# Patient Record
Sex: FEMALE | HISPANIC OR LATINO | Employment: UNEMPLOYED
[De-identification: names, ages, dates, MRNs, and addresses within clinical notes are randomized per-mention and may not be internally consistent; named-entity substitution may affect disease eponyms.]

---

## 2017-12-24 ENCOUNTER — HEALTH MAINTENANCE LETTER (OUTPATIENT)
Age: 22
End: 2017-12-24

## 2018-05-16 ENCOUNTER — HOSPITAL ENCOUNTER (EMERGENCY)
Facility: CLINIC | Age: 23
Discharge: HOME | End: 2018-05-16
Attending: EMERGENCY MEDICINE | Admitting: EMERGENCY MEDICINE
Payer: COMMERCIAL

## 2018-05-16 ENCOUNTER — TELEPHONE (OUTPATIENT)
Dept: INTERNAL MEDICINE | Facility: CLINIC | Age: 23
End: 2018-05-16

## 2018-05-16 VITALS
HEART RATE: 90 BPM | HEIGHT: 60 IN | TEMPERATURE: 98.5 F | OXYGEN SATURATION: 99 % | RESPIRATION RATE: 16 BRPM | DIASTOLIC BLOOD PRESSURE: 71 MMHG | SYSTOLIC BLOOD PRESSURE: 120 MMHG | BODY MASS INDEX: 19.63 KG/M2 | WEIGHT: 100 LBS

## 2018-05-16 DIAGNOSIS — T74.21XA SEXUAL ASSAULT OF ADULT, INITIAL ENCOUNTER: ICD-10-CM

## 2018-05-16 DIAGNOSIS — R41.3 AMNESIA MEMORY LOSS: ICD-10-CM

## 2018-05-16 LAB
AMPHETAMINES UR QL SCN: NEGATIVE
BARBITURATES UR QL: NEGATIVE
BENZODIAZ UR QL: NEGATIVE
CANNABINOIDS UR QL SCN: NEGATIVE
COCAINE UR QL: NEGATIVE
HCG UR QL: NEGATIVE
OPIATES UR QL SCN: NEGATIVE
PCP UR QL SCN: NEGATIVE

## 2018-05-16 PROCEDURE — 25000132 ZZH RX MED GY IP 250 OP 250 PS 637: Performed by: EMERGENCY MEDICINE

## 2018-05-16 PROCEDURE — 25000128 H RX IP 250 OP 636: Performed by: EMERGENCY MEDICINE

## 2018-05-16 PROCEDURE — 96372 THER/PROPH/DIAG INJ SC/IM: CPT

## 2018-05-16 PROCEDURE — 25000125 ZZHC RX 250: Performed by: EMERGENCY MEDICINE

## 2018-05-16 PROCEDURE — 81025 URINE PREGNANCY TEST: CPT | Performed by: EMERGENCY MEDICINE

## 2018-05-16 PROCEDURE — 80307 DRUG TEST PRSMV CHEM ANLYZR: CPT | Performed by: EMERGENCY MEDICINE

## 2018-05-16 PROCEDURE — 99285 EMERGENCY DEPT VISIT HI MDM: CPT

## 2018-05-16 RX ORDER — CEFTRIAXONE SODIUM 250 MG
250 VIAL (EA) INJECTION ONCE
Status: COMPLETED | OUTPATIENT
Start: 2018-05-16 | End: 2018-05-16

## 2018-05-16 RX ORDER — METRONIDAZOLE 500 MG/1
2000 TABLET ORAL ONCE
Status: COMPLETED | OUTPATIENT
Start: 2018-05-16 | End: 2018-05-16

## 2018-05-16 RX ORDER — ONDANSETRON 4 MG/1
4 TABLET, ORALLY DISINTEGRATING ORAL ONCE
Status: COMPLETED | OUTPATIENT
Start: 2018-05-16 | End: 2018-05-16

## 2018-05-16 RX ORDER — AZITHROMYCIN 250 MG/1
1000 TABLET, FILM COATED ORAL ONCE
Status: COMPLETED | OUTPATIENT
Start: 2018-05-16 | End: 2018-05-16

## 2018-05-16 RX ADMIN — ONDANSETRON 4 MG: 4 TABLET, ORALLY DISINTEGRATING ORAL at 18:32

## 2018-05-16 RX ADMIN — AZITHROMYCIN 1000 MG: 250 TABLET, FILM COATED ORAL at 20:00

## 2018-05-16 RX ADMIN — METRONIDAZOLE 2000 MG: 500 TABLET ORAL at 20:00

## 2018-05-16 RX ADMIN — CEFTRIAXONE SODIUM 250 MG: 250 INJECTION, POWDER, FOR SOLUTION INTRAMUSCULAR; INTRAVENOUS at 20:00

## 2018-05-16 ASSESSMENT — ENCOUNTER SYMPTOMS
CONFUSION: 1
HEADACHES: 1
FATIGUE: 1
WEAKNESS: 1
DIZZINESS: 1

## 2018-05-16 NOTE — ED AVS SNAPSHOT
[facility] Emergency Department    [address]    [facility] [address]    Phone:  [phone]    Fax:  [phone]                                       Saskia Ayon   MRN: [id number]    Department:  [facility] Emergency Department   Date of Visit:  5/16/2018           After Visit Summary Signature Page     I have received my discharge instructions, and my questions have been answered. I have discussed any challenges I see with this plan with the nurse or doctor.    ..........................................................................................................................................  Patient/Patient Representative Signature      ..........................................................................................................................................  Patient Representative Print Name and Relationship to Patient    ..................................................               ................................................  Date                                            Time    ..........................................................................................................................................  Reviewed by Signature/Title    ...................................................              ..............................................  Date                                                            Time
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
## 2019-06-17 ENCOUNTER — APPOINTMENT (OUTPATIENT)
Dept: GENERAL RADIOLOGY | Facility: CLINIC | Age: 24
End: 2019-06-17
Attending: EMERGENCY MEDICINE
Payer: COMMERCIAL

## 2019-06-17 ENCOUNTER — HOSPITAL ENCOUNTER (EMERGENCY)
Facility: CLINIC | Age: 24
Discharge: HOME | End: 2019-06-17
Attending: EMERGENCY MEDICINE | Admitting: EMERGENCY MEDICINE
Payer: COMMERCIAL

## 2019-06-17 VITALS
BODY MASS INDEX: 21.2 KG/M2 | HEIGHT: 60 IN | TEMPERATURE: 96.9 F | SYSTOLIC BLOOD PRESSURE: 111 MMHG | DIASTOLIC BLOOD PRESSURE: 70 MMHG | HEART RATE: 73 BPM | RESPIRATION RATE: 16 BRPM | OXYGEN SATURATION: 100 % | WEIGHT: 108 LBS

## 2019-06-17 DIAGNOSIS — R06.00 DYSPNEA, UNSPECIFIED TYPE: ICD-10-CM

## 2019-06-17 DIAGNOSIS — R01.1 HEART MURMUR: ICD-10-CM

## 2019-06-17 DIAGNOSIS — R07.89 CHEST PRESSURE: ICD-10-CM

## 2019-06-17 LAB
ANION GAP SERPL CALCULATED.3IONS-SCNC: 6 MMOL/L (ref 3–14)
B-HCG FREE SERPL-ACNC: <5 IU/L
BASOPHILS # BLD AUTO: 0 10E9/L (ref 0–0.2)
BASOPHILS NFR BLD AUTO: 0.5 %
BUN SERPL-MCNC: 11 MG/DL (ref 7–30)
CALCIUM SERPL-MCNC: 8.8 MG/DL (ref 8.5–10.1)
CHLORIDE SERPL-SCNC: 105 MMOL/L (ref 94–109)
CO2 SERPL-SCNC: 30 MMOL/L (ref 20–32)
CREAT SERPL-MCNC: 0.59 MG/DL (ref 0.52–1.04)
D DIMER PPP FEU-MCNC: 0.3 UG/ML FEU (ref 0–0.5)
DIFFERENTIAL METHOD BLD: NORMAL
EOSINOPHIL # BLD AUTO: 0.1 10E9/L (ref 0–0.7)
EOSINOPHIL NFR BLD AUTO: 1.6 %
ERYTHROCYTE [DISTWIDTH] IN BLOOD BY AUTOMATED COUNT: 11.9 % (ref 10–15)
GFR SERPL CREATININE-BSD FRML MDRD: >90 ML/MIN/{1.73_M2}
GLUCOSE SERPL-MCNC: 82 MG/DL (ref 70–99)
HCT VFR BLD AUTO: 41.7 % (ref 35–47)
HGB BLD-MCNC: 14 G/DL (ref 11.7–15.7)
IMM GRANULOCYTES # BLD: 0 10E9/L (ref 0–0.4)
IMM GRANULOCYTES NFR BLD: 0.3 %
LYMPHOCYTES # BLD AUTO: 2.2 10E9/L (ref 0.8–5.3)
LYMPHOCYTES NFR BLD AUTO: 36.5 %
MCH RBC QN AUTO: 30.2 PG (ref 26.5–33)
MCHC RBC AUTO-ENTMCNC: 33.6 G/DL (ref 31.5–36.5)
MCV RBC AUTO: 90 FL (ref 78–100)
MONOCYTES # BLD AUTO: 0.6 10E9/L (ref 0–1.3)
MONOCYTES NFR BLD AUTO: 9.3 %
NEUTROPHILS # BLD AUTO: 3.2 10E9/L (ref 1.6–8.3)
NEUTROPHILS NFR BLD AUTO: 51.8 %
NRBC # BLD AUTO: 0 10*3/UL
NRBC BLD AUTO-RTO: 0 /100
NT-PROBNP SERPL-MCNC: 28 PG/ML (ref 0–450)
PLATELET # BLD AUTO: 260 10E9/L (ref 150–450)
POTASSIUM SERPL-SCNC: 3.4 MMOL/L (ref 3.4–5.3)
RBC # BLD AUTO: 4.63 10E12/L (ref 3.8–5.2)
SODIUM SERPL-SCNC: 141 MMOL/L (ref 133–144)
WBC # BLD AUTO: 6.1 10E9/L (ref 4–11)

## 2019-06-17 PROCEDURE — 71046 X-RAY EXAM CHEST 2 VIEWS: CPT

## 2019-06-17 PROCEDURE — 99285 EMERGENCY DEPT VISIT HI MDM: CPT | Mod: 25

## 2019-06-17 PROCEDURE — 93005 ELECTROCARDIOGRAM TRACING: CPT

## 2019-06-17 PROCEDURE — 80048 BASIC METABOLIC PNL TOTAL CA: CPT | Performed by: EMERGENCY MEDICINE

## 2019-06-17 PROCEDURE — 85025 COMPLETE CBC W/AUTO DIFF WBC: CPT | Performed by: EMERGENCY MEDICINE

## 2019-06-17 PROCEDURE — 83880 ASSAY OF NATRIURETIC PEPTIDE: CPT | Performed by: EMERGENCY MEDICINE

## 2019-06-17 PROCEDURE — 84702 CHORIONIC GONADOTROPIN TEST: CPT

## 2019-06-17 PROCEDURE — 85379 FIBRIN DEGRADATION QUANT: CPT | Performed by: EMERGENCY MEDICINE

## 2019-06-17 ASSESSMENT — ENCOUNTER SYMPTOMS
SHORTNESS OF BREATH: 1
MYALGIAS: 0
CHILLS: 0
NAUSEA: 0
FEVER: 0
LIGHT-HEADEDNESS: 0

## 2019-06-17 ASSESSMENT — MIFFLIN-ST. JEOR: SCORE: 1166.38

## 2019-06-17 NOTE — ED PROVIDER NOTES
"  History     Chief Complaint:  Shortness of Breath    HPI   Saskia Ayon is a 23 year old female who presents with difficulty breathing. The patient reports that at around 1200 yesterday she began experiencing difficulty breathing, accompanied by chest discomfort which she describes as a \"pressure on her chest.\" She reports that when talking or walking, she experiences worsened shortness of breath. She also states feeling a \"vibration\" when breathing in deeply. The feeling has been constant since onset and is worse with exertion. She denies leg pain, leg swelling, fever, chills, lightheadedness, or nausea. She has not traveled outside the country recently. The patient also notes she has not previous diagnosis of heart murmur.    Allergies:  No Known Drug Allergies    Medications:    Prenatal Multivitamin    Past Medical History:    History reviewed. No pertinent past medical history.    Past Surgical History:    Insert intrauterine device  Orthopedic surgery    Family History:    Diabetes    Social History:  Smoking Status: Former smoker  Smokeless Tobacco: Never used  Alcohol Use: No  Drug Use: No  Marital Status:  Single [1]     Review of Systems   Constitutional: Negative for chills and fever.   Respiratory: Positive for shortness of breath.    Cardiovascular: Positive for chest pain. Negative for leg swelling.   Gastrointestinal: Negative for nausea.   Musculoskeletal: Negative for myalgias.   Neurological: Negative for light-headedness.   All other systems reviewed and are negative.      Physical Exam   Vitals:  Patient Vitals for the past 24 hrs:   BP Temp Temp src Pulse Resp SpO2 Height Weight   06/17/19 1733 111/70 96.9  F (36.1  C) Temporal 73 16 100 % 1.524 m (5') 49 kg (108 lb)       Physical Exam  General: Adult female sitting upright  Eyes: PERRL, Conjunctive within normal limits  ENT: Moist mucous membranes, oropharynx clear.   CV: Normal S1S2. 2/6 systolic murmur. No rub. Regular rate " and rhythm  Resp: Clear to auscultation bilaterally, no wheezes, rales or rhonchi. Normal respiratory effort.  GI: Abdomen is soft, nontender and nondistended. No palpable masses. No rebound or guarding.  MSK: No edema. Nontender. Normal active range of motion. No calf asymmetry or tenderness.  Skin: Warm and dry. No rashes or lesions or ecchymoses on visible skin.  Neuro: Alert and oriented. Responds appropriately to all questions and commands. No focal findings appreciated. Normal muscle tone.  Psych: Normal mood and affect. Pleasant.      Emergency Department Course   ECG:  Completed at 1757.  Read at 1802.   Rate 59 bpm. [place] interval 178. QRS duration 88. QT/QTc 428/423. P-R-T axes 56 109 84.  Sinus bradycardia  Rightward axis  Borderline ECG    Imaging:  Radiographic findings were communicated with the patient who voiced understanding of the findings.    XR Chest, 2 Views:   Normal. As per radiology.    Laboratory:  CBC: AWNL (WBC 6.1, HGB 14.0, )  BMP: AWNL (Creatinine 0.59)    D Dimer (Collected 1800): 0.3    BNP: 28    1801 ISTAT HCG Quantitative Pregnancy POCT <5.0    Emergency Department Course:  Nursing notes and vitals reviewed. 1745 I performed an exam of the patient as documented above.     Blood drawn. This was sent to the lab for further testing, results above.    The patient was sent for a XR Chest while in the emergency department, findings above.     1853 I rechecked the patient and discussed the results of her workup thus far. She denies any new concerns.     Findings and plan explained to the Patient. Patient discharged home with instructions regarding supportive care, medications, and reasons to return. The importance of close follow-up was reviewed.     I personally reviewed the laboratory results with the Patient and answered all related questions prior to discharge.    Impression & Plan      Medical Decision Making:  Saskia Maco is a 22 y.o, healthy at baseline, who  presents of concerns for feeling shortness of breath as well as a tight feeling in her chest. She is on Mirena but no other oral contraceptive. She has no known risk factors for coronary artery disease and is low risk at her age. She is not an IV drug abuser. She had a murmur incidentally noted on today's examination, but no evidence of congestive heart failure. D-dimer and troponin were normal after a long duration of symptoms, making pulmonary embolism/DVT and coronary disease unlikely respectively. Due to the heart    murmur, I recommended outpatient echocardiogram and close follow up with her primary care doctor within 3 days. She should return if she develops any other symptoms. She is otherwise well-appearing with normal vital signs and no objective findings of exam aside from the heart murmur. Discharged home in stable condition.      Diagnosis:    ICD-10-CM   1. Dyspnea, unspecified type R06.00   2. Chest pressure R07.89   3. Heart murmur R01.1       Disposition:  discharged to home    IEugene, am serving as a scribe on 6/17/2019 at 6:08 PM to personally document services performed by Nikky Dorsey MD based on my observations and the provider's statements to me.     I, Bashir Dunaway, am serving as a scribe on 6/17/2019 at 6:56 PM to personally document services performed by Nikky Dorsey MD based on my observations and the provider's statements to me.     6/17/2019   [facility] EMERGENCY DEPARTMENT       Nikky Dorsey MD  06/18/19 0038

## 2019-06-17 NOTE — ED AVS SNAPSHOT
MEDICARE WELLNESS VISIT NOTE    HISTORY OF PRESENT ILLNESS:  Kelli Roberto presents for her First Annual Medicare Wellness Visit.  she has complaints or concerns which include Right Shoulder pain with NKI, GERD, Tremors X 6 mo, plantar fascitis continues    Patient Care Team:  Anthony Momin MD as PCP - General (Family Practice)  Jun Bustos MD as Oncologist (Hematology & Oncology)  Chip Jeff MD as Ophthalmology (Ophthalmology)  Gumaro Kumar MD as Pulmonary Medicine (Pulmonary Medicine)  VIK Robin as Pulmonary Medicine (Nurse Practitioner)    Patient Active Problem List    Diagnosis Date Noted   • Breast cancer 04/12/2014     Priority: Medium     T1c  N1  M0 invasive duct carcinoma Right breast  (ER positive, MT  positive, Her-2-tamera negative) of the Rt breast (AJCC Stage IIa) status post lumpectomy and  lymph node dissection. Oncotype DX= 13           • Pseudophakia of both eyes 05/02/2018     Priority: Low   • Age-related nuclear cataract of left eye 03/23/2018     Priority: Low   • Paresthesias in right hand 09/27/2017     Priority: Low   • Low-tension glaucoma of both eyes, severe stage 01/27/2017     Priority: Low   • ARMD (age-related macular degeneration), bilateral 09/22/2016     Priority: Low   • Sleep hygiene issues 01/12/2016     Priority: Low     Reads in bed-resolved.   watches TV in bed.     • Obesity, morbid (CMS/HCC) 01/11/2016     Priority: Low   • Status post total abdominal hysterectomy and bilateral salpingo-oophorectomy (JAMES-BSO) 01/11/2016     Priority: Low   • Status post right breast lumpectomy 01/11/2016     Priority: Low     Breast cancer     • Status post nasal septoplasty 01/11/2016     Priority: Low   • Status post bilateral laser eye surgery 01/11/2016     Priority: Low   • OA (osteoarthritis) 01/11/2016     Priority: Low   • Thyroid nodule 01/11/2016     Priority: Low   • LEIF (obstructive sleep apnea) 01/11/2016     Priority: Low      [facility] Emergency Department  [address]  [facility] [address]  Phone:  [phone]  Fax:  [phone]                                    Saskia Ayon   MRN: [id number]    Department:  [facility] Emergency Department   Date of Visit:  6/17/2019           After Visit Summary Signature Page    I have received my discharge instructions, and my questions have been answered. I have discussed any challenges I see with this plan with the nurse or doctor.    ..........................................................................................................................................  Patient/Patient Representative Signature      ..........................................................................................................................................  Patient Representative Print Name and Relationship to Patient    ..................................................               ................................................  Date                                   Time    ..........................................................................................................................................  Reviewed by Signature/Title    ...................................................              ..............................................  Date                                               Time          22EPIC Rev 08/18        NPSG performed at [place] on 10/22/15.  Wt = 215 lbs. / 98 kg  AHI = 52.9 events/hr.  Max. Desat = 87 %.  APAP @ 10-15 cm H2O recommended.     • Lymphedema, right arm 11/05/2015     Priority: Low   • Glaucoma, normal tension 03/24/2015     Priority: Low   • Seborrheic dermatitis 02/14/2014     Priority: Low   • Status post bilateral total hip arthroplasty 12/24/2013     Priority: Low   • Dyslipidemia      Priority: Low   • IFG (impaired fasting glucose)      Priority: Low   • Major depressive disorder with single episode, in full remission (CMS/HCC)      Priority: Low   • Asthma      Priority: Low   • GERD (gastroesophageal reflux disease)      Priority: Low   • Colon polyps      Priority: Low     Colonoscopy 08/27/2018. Ascending colon polyp removed. Follow-up in 5 years.  Colonoscopy 07/20/2012. Tubular adenoma removed from ascending colon.         Past Medical History:   Diagnosis Date   • Asthma    • Breast mass, right    • Colon polyps    • Depression    • GERD (gastroesophageal reflux disease)    • Glaucoma    • History of breast cancer    • Hyperlipidemia    • IFG (impaired fasting glucose)    • Inflamed seborrheic keratosis    • Malignant neoplasm (CMS/HCC)     Breast   • Morbid obesity with BMI of 40.0-44.9, adult (CMS/HCC) 1/11/2016   • Right hip pain     OA   • Seborrheic dermatitis, unspecified 2/14/2014   • Sleep apnea 4/2016    using CPAP       Past Surgical History:   Procedure Laterality Date   • Bilateral salpingoophorectomy     • Breast lumpectomy Right 03/19/2014    Right Breast   • Cataract extraction w/  intraocular lens implant Left 05/01/2018    Trabeculectomy w express shunt- Dr. Jeff   • Cataract extraction w/ intraocular lens implant Right 12/05/2017    WITH TRABECULECTOMY-RIGHT EYE- Dr. Jeff   • Colonoscopy  08/27/2018    repeat 5 years-[facility]   • Eye surgery  4-16/13    SLT Laser OU,also 2011 OD and 2008 OS   • Eye surgery Right 01/08/2019     EYE WOUND REVISION/BLED NEEDLING/  [facility]   • Hip surgery Left 2015   • Hysterectomy     • Joint replacement     • Nasal septum surgery     • Total hip arthroplasty  2013    right       Social History     Tobacco Use   • Smoking status: Former Smoker     Packs/day: 0.25     Years: 3.00     Pack years: 0.75     Start date: 1969     Last attempt to quit: 1972     Years since quittin.4   • Smokeless tobacco: Never Used   Substance Use Topics   • Alcohol use: Yes     Alcohol/week: 1.2 - 1.8 oz     Types: 2 - 3 Standard drinks or equivalent per week   • Drug use: No       Family History   Problem Relation Age of Onset   • Cancer Father         Mouth and throat   • Hypertension Father    • Hypertension Mother    • Cataracts Mother    • Thyroid Mother    • Rheumatoid Arthritis Mother    • Colon Polyps Brother    • Rheumatoid Arthritis Brother    • Hyperlipidemia Brother    • Dementia/Alzheimers Brother    • Hyperlipidemia Son    • Hyperlipidemia Son    • Sleep Apnea Neg Hx        Current Outpatient Medications   Medication Sig Dispense Refill   • timolol (TIMOPTIC) 0.5 % ophthalmic solution Place 1 drop into both eyes every morning. 10 mL 2   • meloxicam (MOBIC) 15 MG tablet TAKE ONE TABLET BY MOUTH DAILY 90 tablet 0   • FLUoxetine (PROZAC) 20 MG capsule Take 3 capsules by mouth daily. 270 capsule 3   • anastrozole (ARIMIDEX) 1 MG tablet Take 1 tablet by mouth daily. 90 tablet 3   • betamethasone dipropionate augmented (DIPROLENE AF) 0.05 % cream AAA BID 2-3 weeks as needed for rash 30 g 2   • acetaminophen (TYLENOL) 500 MG tablet Take 1,000 mg by mouth as needed for Pain.     • ibuprofen (MOTRIN) 200 MG tablet Take 400 mg by mouth as needed for Pain.     • Multiple Vitamins-Minerals (ICAPS AREDS 2) Cap Take 1 capsule by mouth 2 times daily.      • ketoconazole (NIZORAL) 2 % cream Apply once daily 60 g 0   • Hypromellose (ARTIFICIAL TEARS OP) Apply to eye as needed.      • vitamin E 400 UNIT capsule Take 400 Units by mouth daily.       • VITAMIN D, CHOLECALCIFEROL, PO Take 400 Int'l Units by mouth daily.     • Multiple Vitamins-Minerals (MULTIVITAMIN PO) Take 1 tablet by mouth daily.     • B COMPLEX VITAMINS PO Take 1 tablet by mouth daily.     • Calcium-Vitamin D 600-200 MG-UNIT TABS Take 1 tablet by mouth 2 times daily.     • ALPRAZolam (XANAX) 0.25 MG tablet Take 1 tablet by mouth 1 time as needed (45 mins prior to air travel). 12 tablet 0     No current facility-administered medications for this visit.        6 b.) How many servings of High Fiber / Whole Grain Foods to you have each day ( 1 serving = 1 cup cold cereal, 1/2 cup cooked cereal, 1 slice bread):  1 per day       6) Cholesterol Diet information is provided to the patient in the After Visit Summary (AVS).      Vision and Hearing Screens:  Hearing Screening Comments: Patient passes whisper test bilaterally. Patient wears corrective lenses and sees their eye doctor regularly.   Advanced Directives Document:  Yes   Power of  for Health Care      Cognitive Assessment:  no evidence of cognitive dysfunction by direct observation    Cardiovascular Risk:  The 10-year ASCVD risk score (Drummond DC Jr., et al., 2013) is: 11.2%    Values used to calculate the score:      Age: 66 years      Sex: Female      Is Non- : No      Diabetic: No      Tobacco smoker: No      Systolic Blood Pressure: 164 mmHg      Is BP treated: No      HDL Cholesterol: 50 mg/dL      Total Cholesterol: 245 mg/dL    Most Recent PHQ2/9 Scores:  Date Adult PHQ 2 Score   3/19/2018 1           COL[place]IA-SUICIDE SEVERITY RATING SCALE   See \"Screenings\" section of Epic Chart    Depression screening is negative no further plan needed.    Health Maintenance Due   Topic Date Due   • Shingles Vaccine (2 of 3) 04/14/2015   • Medicare Wellness 65+  03/19/2019   She is on a waiting list for Shingrix    Erika model does not apply d/t a Hx of Breast Cancer     None    See orders.  See Patient Instruction  Section  No follow-ups on file.

## 2019-06-17 NOTE — ED TRIAGE NOTES
Patient presents with SOB that started yesterday. Patient denies long car rides or being on a plane. ABC's intact.

## 2019-06-18 LAB — INTERPRETATION ECG - MUSE: NORMAL

## 2019-06-20 ENCOUNTER — HOSPITAL ENCOUNTER (OUTPATIENT)
Dept: CARDIOLOGY | Facility: CLINIC | Age: 24
Discharge: HOME | End: 2019-06-20
Attending: EMERGENCY MEDICINE | Admitting: EMERGENCY MEDICINE
Payer: COMMERCIAL

## 2019-06-20 DIAGNOSIS — R07.89 CHEST PRESSURE: ICD-10-CM

## 2019-06-20 DIAGNOSIS — R01.1 HEART MURMUR: ICD-10-CM

## 2019-06-20 DIAGNOSIS — R06.00 DYSPNEA, UNSPECIFIED TYPE: ICD-10-CM

## 2019-06-20 PROCEDURE — 93306 TTE W/DOPPLER COMPLETE: CPT

## 2019-06-20 PROCEDURE — 93306 TTE W/DOPPLER COMPLETE: CPT | Mod: 26 | Performed by: INTERNAL MEDICINE

## 2020-03-10 ENCOUNTER — HEALTH MAINTENANCE LETTER (OUTPATIENT)
Age: 25
End: 2020-03-10

## 2020-11-09 ENCOUNTER — TELEPHONE (OUTPATIENT)
Dept: OBGYN | Facility: CLINIC | Age: 25
End: 2020-11-09

## 2020-11-09 NOTE — TELEPHONE ENCOUNTER
Please call patient back today. She scheduled an appointment for an IUD Removal and wants to know if she will have heavy bleeding right away

## 2020-11-18 ENCOUNTER — OFFICE VISIT (OUTPATIENT)
Dept: OBGYN | Facility: CLINIC | Age: 25
End: 2020-11-18
Payer: COMMERCIAL

## 2020-11-18 VITALS — BODY MASS INDEX: 20.78 KG/M2 | SYSTOLIC BLOOD PRESSURE: 112 MMHG | WEIGHT: 106.4 LBS | DIASTOLIC BLOOD PRESSURE: 60 MMHG

## 2020-11-18 DIAGNOSIS — Z30.432 ENCOUNTER FOR REMOVAL OF INTRAUTERINE CONTRACEPTIVE DEVICE: Primary | ICD-10-CM

## 2020-11-18 DIAGNOSIS — Z12.4 PAP SMEAR FOR CERVICAL CANCER SCREENING: ICD-10-CM

## 2020-11-18 DIAGNOSIS — Z11.3 SCREEN FOR STD (SEXUALLY TRANSMITTED DISEASE): ICD-10-CM

## 2020-11-18 PROCEDURE — 99202 OFFICE O/P NEW SF 15 MIN: CPT | Mod: 25 | Performed by: ADVANCED PRACTICE MIDWIFE

## 2020-11-18 PROCEDURE — 87491 CHLMYD TRACH DNA AMP PROBE: CPT | Performed by: ADVANCED PRACTICE MIDWIFE

## 2020-11-18 PROCEDURE — 87591 N.GONORRHOEAE DNA AMP PROB: CPT | Performed by: ADVANCED PRACTICE MIDWIFE

## 2020-11-18 PROCEDURE — G0145 SCR C/V CYTO,THINLAYER,RESCR: HCPCS | Performed by: ADVANCED PRACTICE MIDWIFE

## 2020-11-18 PROCEDURE — 58301 REMOVE INTRAUTERINE DEVICE: CPT | Performed by: ADVANCED PRACTICE MIDWIFE

## 2020-11-18 NOTE — NURSING NOTE
Chief Complaint   Patient presents with     IUD     Mirena IUD was placed 2016, patient declines having IUD replaced.        Initial /60   Wt 48.3 kg (106 lb 6.4 oz)   BMI 20.78 kg/m   Estimated body mass index is 20.78 kg/m  as calculated from the following:    Height as of 19: 1.524 m (5').    Weight as of this encounter: 48.3 kg (106 lb 6.4 oz).  BP completed using cuff size: regular    Questioned patient about current smoking habits.  Pt. has never smoked.          The following HM Due: NONE    Solomon Villa CMA

## 2020-11-18 NOTE — PATIENT INSTRUCTIONS
Patient Education     Dysfunctional Uterine Bleeding    Dysfunctional uterine bleeding, also called abnormal uterine bleeding, is a condition in which bleeding is abnormal and occurs at unexpected times of the month. This happens because of changes in the hormones that help control a woman s menstrual cycle each month.  The bleeding may be heavier or lighter than normal. If you have heavy bleeding often, this can lead to a problem called anemia. With anemia, your red blood cell count is too low. Red blood cells help carry oxygen throughout your body. Severe anemia may cause you to look pale and feel very weak or tired. You might also become short of breath easily.  To treat dysfunctional uterine bleeding, medicines are often tried first. If these don t help, or if you have additional symptoms or have reached menopause, further testing and treatments may be needed. Discuss all of your options with your provider.  Home care  Medicines  If you re prescribed medicines, be sure to take them as directed. Some of the more common medicines you may be prescribed include:    Hormone therapy (Options include most methods of hormonal birth control such as pills, shots, or a hormone-releasing IUD)    Nonsteroidal anti-inflammatory drugs (NSAIDs), such as ibuprofen    Iron supplements, if you have anemia     General care    Get plenty of rest if you tire easily. Avoid heavy exertion.    To help relieve pain or cramping that may occur with bleeding, try using a heating pad on the lower belly or back. A warm bath may also help.  Follow-up care  Follow up with your healthcare provider, or as directed.  When to seek medical advice  Call your healthcare provider right away if:    Bleeding becomes heavy (soaking 1 pad or tampon every hour for 3 hours)    Increased abdominal pain    Irregular bleeding worsens or does not get better even with treatment    Fever of 100.4 F (38 C) or higher, or as directed by your provider    Signs of  anemia, such as pale skin, extreme fatigue or weakness, or shortness of breath    Dizziness or fainting   Dakota last reviewed this educational content on 11/1/2017 2000-2020 [employer]. [address] [place] [address], [place] [address]. All rights reserved. This information is not intended as a substitute for professional medical care. Always follow your healthcare professional's instructions.

## 2020-11-18 NOTE — LETTER
November 27, 2020      Saskia Ayon  [address]  [place] [address]        Dear Ms.Catalan Ayon,    We are happy to inform you that your recent Pap smear is normal.    It is recommended that you have your next Pap smear in 3 years. You will also need to return to the clinic every year for an annual wellness visit.    If you have additional questions regarding this result, please contact our office and we will be happy to assist you.      Sincerely,    Your [facility] Care Team

## 2020-11-18 NOTE — PROGRESS NOTES
"  SUBJECTIVE:                                                   Saskia Ayon is a 25 year old who presents to clinic today for the following health issue(s):  Patient presents with:  IUD: Mirena IUD was placed 2016, patient declines having IUD replaced.       HPI:  Saskia presents today to have her Mirena IUD removed. Has had it place since 2016. She has had no problems with the IUD. She just wants her body to get back to normal. She is not currently sexually active, and does not want to start on another form of birth control right now. She is also due for her pap test, and is requesting an STD screen as she is \"unsure about\" her last partner.    No LMP recorded. (Menstrual status: IUD).  Menstrual History: amenorrhea due to IUD  Patient is not sexually active  .  Plans on Using nothing for contraception at this time.   Health maintenance updated:  yes  STI infx testing offered:  Accepted    Last PHQ-9 score on record =   PHQ-9 SCORE 2015   PHQ-9 Total Score 2     Last GAD7 score on record = No flowsheet data found.      Problem list and histories reviewed & adjusted, as indicated.  Additional history: as documented.    Patient Active Problem List   Diagnosis     NO ACTIVE PROBLEMS     IUD (intrauterine device) in place     Past Surgical History:   Procedure Laterality Date     [facility]        Social History     Tobacco Use     Smoking status: Former Smoker     Smokeless tobacco: Never Used   Substance Use Topics     Alcohol use: No     Alcohol/week: 0.0 standard drinks      Problem (# of Occurrences) Relation (Name,Age of Onset)    Diabetes (1) Father            No current outpatient medications on file.     No current facility-administered medications for this visit.      No Known Allergies    ROS:  CONSTITUTIONAL: NEGATIVE for fever, chills, change in weight  GI: NEGATIVE for nausea, abdominal pain, heartburn, or " change in bowel habits  : NEGATIVE for unusual urinary or vaginal symptoms. Periods are absent.  NEURO: NEGATIVE for weakness, dizziness or paresthesias  HEME/ALLERGY/IMMUNE: NEGATIVE for bleeding problems  PSYCHIATRIC: NEGATIVE for changes in mood or affect    OBJECTIVE:     /60   Wt 48.3 kg (106 lb 6.4 oz)   BMI 20.78 kg/m    Body mass index is 20.78 kg/m .    PHYSICAL EXAM:  Constitutional:  Appearance: Well nourished, well developed alert, in no acute distress  Chest:  Respiratory Effort:  Breathing unlabored.   Neurologic:  Mental Status:  Oriented X3.  Normal strength and tone, sensory exam grossly normal, mentation intact and speech normal.    Psychiatric:  Mentation appears normal and affect normal/bright.     Pelvic Exam:  Vulva: No external lesions, normal hair distribution, no adenopathy  Vagina: Moist, pink, no abnormal discharge, well rugated, no lesions, swab collected for STD test  Cervix: Pap smear is taken, parous, smooth, pink, no visible lesions, IUD strings visualized at cervical os  Uterus: Normal size, anteverted, non-tender, mobile  Ovaries: No mass, non-tender, mobile  Rectal exam: Deferred    Procedure:  Strings grasped with ring forceps and IUD easily removed, intact.   Patient tolerated well, no complications.    In-Clinic Test Results:  No results found for this or any previous visit (from the past 24 hour(s)).    ASSESSMENT/PLAN:                                                        ICD-10-CM    1. Encounter for removal of intrauterine contraceptive device  Z30.432 REMOVE INTRAUTERINE DEVICE   2. Screen for STD (sexually transmitted disease)  Z11.3 NEISSERIA GONORRHOEA PCR     CHLAMYDIA TRACHOMATIS PCR   3. Pap smear for cervical cancer screening  Z12.4 Pap imaged thin layer screen reflex to HPV if ASCUS - recommend age 25 - 29       PLAN:    1. IUD removed easily with no complications. Advised patient to use condoms if she becomes sexually active prior to starting on new form  of birth control. All methods of birth control including oral contraceptive pills, patches, vaginal ring, implant, shot, IUD (hormonal and copper), barrier methods discussed including risks, benefits, and alternatives to each. Given written information.    2. Lab pending. Will advise patient of results when available.    3. Lab pending. Will advise patient of results when available.    CICI oGnzáles, CNM

## 2020-11-19 LAB
C TRACH DNA SPEC QL NAA+PROBE: POSITIVE
N GONORRHOEA DNA SPEC QL NAA+PROBE: NEGATIVE
SPECIMEN SOURCE: ABNORMAL
SPECIMEN SOURCE: NORMAL

## 2020-11-23 LAB
COPATH REPORT: NORMAL
PAP: NORMAL

## 2020-11-27 NOTE — PROGRESS NOTES
Pt positive Chlamydia, [facility] form filled out and faxed, Dasha did call in prescription  Aimee Cope, CMA

## 2020-12-27 ENCOUNTER — HEALTH MAINTENANCE LETTER (OUTPATIENT)
Age: 25
End: 2020-12-27

## 2021-03-17 ENCOUNTER — OFFICE VISIT (OUTPATIENT)
Dept: OBGYN | Facility: CLINIC | Age: 26
End: 2021-03-17
Payer: COMMERCIAL

## 2021-03-17 VITALS — WEIGHT: 110.5 LBS | DIASTOLIC BLOOD PRESSURE: 62 MMHG | BODY MASS INDEX: 21.58 KG/M2 | SYSTOLIC BLOOD PRESSURE: 112 MMHG

## 2021-03-17 DIAGNOSIS — N64.4 MASTALGIA IN FEMALE: Primary | ICD-10-CM

## 2021-03-17 PROCEDURE — 99213 OFFICE O/P EST LOW 20 MIN: CPT | Performed by: OBSTETRICS & GYNECOLOGY

## 2021-03-17 NOTE — PROGRESS NOTES
SUBJECTIVE: Saskia Ayon is a 25 year old female P1 who presents with complaint of bilateral breast pain  Patient denies redness or discharge. She states the symptoms were present for 1-2 weeks but have now resolved.    She denies any new undergarments, trauma, chest wall strain, or new medications either prescribed or OTC.  She never felt a lump/mass or point tenderness.  The nipples were also sensitive,         Past Medical History:   Diagnosis Date     IUD (intrauterine device) in place 01/05/2016    Removed 11/18/2020     No active medical problems      Family History   Problem Relation Age of Onset     Diabetes Father      No current outpatient medications on file.            OBJECTIVE:  /62   Wt 50.1 kg (110 lb 8 oz)   LMP 03/11/2021   Breastfeeding No   BMI 21.58 kg/m    Breast:  symmetrical  without lesions  no palpable mass  no nipple discharge  no axillary adenopathy  no supraclavicular adenopathy  no infraclavicular adenopathy                    ASSESSMENT/ PLAN:  (N64.4) Mastalgia in female  (primary encounter diagnosis)  Comment: Symptoms have resolved and exam normal  Plan: Reassurance provided    Follow up prn  No treatment or imaging indicated as symptoms have resolved  Self breast exam demonstrated and encouraged

## 2021-03-25 ENCOUNTER — MYC MEDICAL ADVICE (OUTPATIENT)
Dept: OBGYN | Facility: CLINIC | Age: 26
End: 2021-03-25

## 2021-03-25 DIAGNOSIS — R30.0 DYSURIA: ICD-10-CM

## 2021-03-25 DIAGNOSIS — R30.0 DYSURIA: Primary | ICD-10-CM

## 2021-03-25 LAB
ALBUMIN UR-MCNC: 100 MG/DL
APPEARANCE UR: ABNORMAL
BACTERIA #/AREA URNS HPF: ABNORMAL /HPF
BILIRUB UR QL STRIP: ABNORMAL
COLOR UR AUTO: YELLOW
GLUCOSE UR STRIP-MCNC: NEGATIVE MG/DL
HGB UR QL STRIP: ABNORMAL
KETONES UR STRIP-MCNC: 40 MG/DL
LEUKOCYTE ESTERASE UR QL STRIP: ABNORMAL
NITRATE UR QL: NEGATIVE
NON-SQ EPI CELLS #/AREA URNS LPF: ABNORMAL /LPF
PH UR STRIP: 6 PH (ref 5–7)
RBC #/AREA URNS AUTO: ABNORMAL /HPF
SOURCE: ABNORMAL
SP GR UR STRIP: >1.03 (ref 1–1.03)
UROBILINOGEN UR STRIP-ACNC: 2 EU/DL (ref 0.2–1)
WBC #/AREA URNS AUTO: >100 /HPF
WBC CLUMPS #/AREA URNS HPF: PRESENT /HPF

## 2021-03-25 PROCEDURE — 87086 URINE CULTURE/COLONY COUNT: CPT | Performed by: ADVANCED PRACTICE MIDWIFE

## 2021-03-25 PROCEDURE — 81001 URINALYSIS AUTO W/SCOPE: CPT | Performed by: ADVANCED PRACTICE MIDWIFE

## 2021-03-25 RX ORDER — NITROFURANTOIN 25; 75 MG/1; MG/1
100 CAPSULE ORAL 2 TIMES DAILY
Qty: 14 CAPSULE | Refills: 0 | Status: SHIPPED | OUTPATIENT
Start: 2021-03-25 | End: 2021-06-03

## 2021-03-25 NOTE — TELEPHONE ENCOUNTER
Pt messaging with burning with urination.  Also messaged yesterday, had offered OV which she did not make.  Is asking about OTC remedies-advised AZO.    OK to order UA and have pt do lab only?    Lilo Ch RN

## 2021-03-25 NOTE — TELEPHONE ENCOUNTER
UA suggestive of UTI. Antibiotics and UC ordered. Prescription sent to [facility] in [place] listed in chart. Please ask patient to increase fluids and rest. We will advise her of her UC results when available. If symptoms persist or become worse after antibiotics, advise patient to call.    Thank you,    CICI Gonzáles, CNM

## 2021-03-26 LAB
BACTERIA SPEC CULT: NORMAL
Lab: NORMAL
SPECIMEN SOURCE: NORMAL

## 2021-03-30 ENCOUNTER — OFFICE VISIT (OUTPATIENT)
Dept: OBGYN | Facility: CLINIC | Age: 26
End: 2021-03-30
Payer: COMMERCIAL

## 2021-03-30 VITALS — SYSTOLIC BLOOD PRESSURE: 96 MMHG | BODY MASS INDEX: 20.45 KG/M2 | WEIGHT: 104.7 LBS | DIASTOLIC BLOOD PRESSURE: 64 MMHG

## 2021-03-30 DIAGNOSIS — B00.9 HSV (HERPES SIMPLEX VIRUS) INFECTION: Primary | ICD-10-CM

## 2021-03-30 PROCEDURE — 99213 OFFICE O/P EST LOW 20 MIN: CPT | Performed by: OBSTETRICS & GYNECOLOGY

## 2021-03-30 PROCEDURE — 87252 VIRUS INOCULATION TISSUE: CPT | Mod: 90 | Performed by: OBSTETRICS & GYNECOLOGY

## 2021-03-30 PROCEDURE — 99000 SPECIMEN HANDLING OFFICE-LAB: CPT | Performed by: OBSTETRICS & GYNECOLOGY

## 2021-03-30 RX ORDER — LIDOCAINE 50 MG/G
OINTMENT TOPICAL PRN
Qty: 50 G | Refills: 1 | Status: SHIPPED | OUTPATIENT
Start: 2021-03-30 | End: 2021-06-03

## 2021-03-30 RX ORDER — VALACYCLOVIR HYDROCHLORIDE 1 G/1
1000 TABLET, FILM COATED ORAL 2 TIMES DAILY
Qty: 20 TABLET | Refills: 0 | Status: SHIPPED | OUTPATIENT
Start: 2021-03-30 | End: 2021-06-03

## 2021-03-30 RX ORDER — OXYCODONE HYDROCHLORIDE 5 MG/1
5 TABLET ORAL EVERY 6 HOURS PRN
Qty: 12 TABLET | Refills: 0 | Status: SHIPPED | OUTPATIENT
Start: 2021-03-30 | End: 2021-04-02

## 2021-03-30 NOTE — PROGRESS NOTES
"  SUBJECTIVE:                                                   Saskia Ayon is a 25 year old female who presents to clinic today for the following health issue(s):  Patient presents with:  Vaginal Problem      HPI:  Patient reported dysuria on 3/24.  Was treated for a UTI with macrobid.  Reported worsening symptoms including fever and headache followed by severe pelvic/vulvar pain making it difficult to sit.  Presents today for further evaluation and symptoms are not improving.    Of note patient has a new sexual partner x 2 months    Patient's last menstrual period was 2021..   Patient is sexually active, .       Problem list and histories reviewed & adjusted, as indicated.  Additional history: as documented.    Patient Active Problem List   Diagnosis     NO ACTIVE PROBLEMS     Past Surgical History:   Procedure Laterality Date     INSERT INTRAUTERINE DEVICE  16    [facility]     ORTHOPEDIC SURGERY        Social History     Tobacco Use     Smoking status: Former Smoker     Smokeless tobacco: Never Used   Substance Use Topics     Alcohol use: No     Alcohol/week: 0.0 standard drinks      Problem (# of Occurrences) Relation (Name,Age of Onset)    Diabetes (1) Father            nitroFURantoin macrocrystal-monohydrate (MACROBID) 100 MG capsule, Take 1 capsule (100 mg) by mouth 2 times daily    No current facility-administered medications on file prior to visit.     No Known Allergies    ROS:  5 point ROS negative except as noted above in HPI, including Gen., Resp., CV, GI &  system review.    OBJECTIVE:     BP 96/64   Wt 47.5 kg (104 lb 11.2 oz)   LMP 2021   BMI 20.45 kg/m     BMI: Body mass index is 20.45 kg/m .  General: Alert and oriented, no distress.  Psychiatric: Mood and affect within normal limits.  Appears uncomfortable and states \"I'm scared\"  Vulva:  Covered bilaterally in ulcereative lesions and blisters bilaterally. HSV culture obtained.  Tissue extremely tender and " friable Urethra:  Midline,  well supported, no discharge  Vagina:  Multiple ulcerative lesions at vaginal introitus.  Seclum exam not performed  Cervix: not assessed  Uterus:  deferred  Rectal Exam: deferred  Musculoskeletal: extremities normal        ASSESSMENT/PLAN:                                                        ICD-10-CM    1. HSV (herpes simplex virus) infection  B00.9 valACYclovir (VALTREX) 1000 mg tablet     lidocaine (XYLOCAINE) 5 % external ointment     oxyCODONE (ROXICODONE) 5 MG tablet         Extensive outbreak of HSV strongly suspected.  Diagnosis explained    Valtrex 100mg BID x 10 dys  Lidociane ointment prn  Oxycodone 5mg Q4 hrs prn    Information on genital herpes provided.    Avoid intercourse and partner should be tested    Amadeo Gupta MD  [facility]

## 2021-03-30 NOTE — NURSING NOTE
Chief Complaint   Patient presents with     Vaginal Problem   c/o 1 week of vaginal changes and now hives on buttocks and other areas of body.   Started Macrobid on 3/25/21    initial BP 96/64   Wt 47.5 kg (104 lb 11.2 oz)   LMP 03/11/2021   BMI 20.45 kg/m   Estimated body mass index is 20.45 kg/m  as calculated from the following:    Height as of 6/17/19: 1.524 m (5').    Weight as of this encounter: 47.5 kg (104 lb 11.2 oz).  BP completed using cuff size regular.  Patricia Liao CMA

## 2021-04-01 LAB
SPECIMEN SOURCE: ABNORMAL
VIRUS SPEC CULT: ABNORMAL
VIRUS SPEC CULT: ABNORMAL

## 2021-04-24 ENCOUNTER — HEALTH MAINTENANCE LETTER (OUTPATIENT)
Age: 26
End: 2021-04-24

## 2021-06-03 ENCOUNTER — PRENATAL OFFICE VISIT (OUTPATIENT)
Dept: NURSING | Facility: CLINIC | Age: 26
End: 2021-06-03
Payer: COMMERCIAL

## 2021-06-03 DIAGNOSIS — Z34.90 SUPERVISION OF NORMAL PREGNANCY: Primary | ICD-10-CM

## 2021-06-03 PROCEDURE — 99207 PR NO CHARGE NURSE ONLY: CPT

## 2021-06-03 RX ORDER — PNV NO.95/FERROUS FUM/FOLIC AC 28MG-0.8MG
TABLET ORAL
COMMUNITY

## 2021-06-03 SDOH — ECONOMIC STABILITY: FOOD INSECURITY: WITHIN THE PAST 12 MONTHS, YOU WORRIED THAT YOUR FOOD WOULD RUN OUT BEFORE YOU GOT MONEY TO BUY MORE.: NOT ASKED

## 2021-06-03 SDOH — ECONOMIC STABILITY: INCOME INSECURITY: HOW HARD IS IT FOR YOU TO PAY FOR THE VERY BASICS LIKE FOOD, HOUSING, MEDICAL CARE, AND HEATING?: NOT ASKED

## 2021-06-03 SDOH — ECONOMIC STABILITY: FOOD INSECURITY: WITHIN THE PAST 12 MONTHS, THE FOOD YOU BOUGHT JUST DIDN'T LAST AND YOU DIDN'T HAVE MONEY TO GET MORE.: NOT ASKED

## 2021-06-03 SDOH — ECONOMIC STABILITY: TRANSPORTATION INSECURITY
IN THE PAST 12 MONTHS, HAS LACK OF TRANSPORTATION KEPT YOU FROM MEETINGS, WORK, OR FROM GETTING THINGS NEEDED FOR DAILY LIVING?: NOT ASKED

## 2021-06-03 SDOH — ECONOMIC STABILITY: TRANSPORTATION INSECURITY
IN THE PAST 12 MONTHS, HAS THE LACK OF TRANSPORTATION KEPT YOU FROM MEDICAL APPOINTMENTS OR FROM GETTING MEDICATIONS?: NOT ASKED

## 2021-06-03 NOTE — NURSING NOTE
NPN nurse visit done over the phone. Pt will be given NPN folder and book at her upcoming appt.   Discussed optional screening available to assess chromosomal anomalies. Questions answered. Pt advised to call the clinic if she has any questions or concerns related to her pregnancy. Prenatal labs will be obtained at her upcoming appt. New prenatal visit scheduled on 7/1 with Dr. Hernandez.    6w0d    Lab Results   Component Value Date    PAP NIL 11/18/2020           Patient supplied answers from flow sheet for:  Prenatal OB Questionnaire.  Past Medical History  Have you ever recieved care for your mental health? : (P) No  Have you ever been in a major accident or suffered serious trauma?: (P) No  Within the last year, has anyone hit, slapped, kicked or otherwise hurt you?: (P) No  In the last year, has anyone forced you to have sex when you didn't want to?: (P) No    Past Medical History 2   Have you ever received a blood transfusion?: (P) No  Would you accept a blood transfusion if was medically recommended?: (P) Yes  Does anyone in your home smoke?: (P) No   Is your blood type Rh negative?: (P) No  Have you ever ?: (!) (P) Yes  Have you been hospitalized for a nonsurgical reason excluding normal delivery?: No  Have you ever had an abnormal pap smear?: (P) No    Past Medical History (Continued)  Do you have a history of abnormalities of the uterus?: (P) No  Did your mother take ASCENCION or any other hormones when she was pregnant with you?: (P) No  Do you have any other problems we have not asked about which you feel may be important to this pregnancy?: (P) No       Lizette ARCOS RN

## 2021-06-22 ENCOUNTER — ANCILLARY PROCEDURE (OUTPATIENT)
Dept: ULTRASOUND IMAGING | Facility: CLINIC | Age: 26
End: 2021-06-22
Payer: COMMERCIAL

## 2021-06-22 DIAGNOSIS — Z34.90 SUPERVISION OF NORMAL PREGNANCY: ICD-10-CM

## 2021-06-22 LAB
ERYTHROCYTE [DISTWIDTH] IN BLOOD BY AUTOMATED COUNT: 12.3 % (ref 10–15)
HCT VFR BLD AUTO: 35.7 % (ref 35–47)
HGB BLD-MCNC: 12.1 G/DL (ref 11.7–15.7)
MCH RBC QN AUTO: 30.2 PG (ref 26.5–33)
MCHC RBC AUTO-ENTMCNC: 33.9 G/DL (ref 31.5–36.5)
MCV RBC AUTO: 89 FL (ref 78–100)
PLATELET # BLD AUTO: 292 10E9/L (ref 150–450)
RBC # BLD AUTO: 4.01 10E12/L (ref 3.8–5.2)
WBC # BLD AUTO: 10.2 10E9/L (ref 4–11)

## 2021-06-22 PROCEDURE — 36415 COLL VENOUS BLD VENIPUNCTURE: CPT | Performed by: OBSTETRICS & GYNECOLOGY

## 2021-06-22 PROCEDURE — 76801 OB US < 14 WKS SINGLE FETUS: CPT | Performed by: OBSTETRICS & GYNECOLOGY

## 2021-06-22 PROCEDURE — 86780 TREPONEMA PALLIDUM: CPT | Mod: 90 | Performed by: OBSTETRICS & GYNECOLOGY

## 2021-06-22 PROCEDURE — 99000 SPECIMEN HANDLING OFFICE-LAB: CPT | Performed by: OBSTETRICS & GYNECOLOGY

## 2021-06-22 PROCEDURE — 87340 HEPATITIS B SURFACE AG IA: CPT | Performed by: OBSTETRICS & GYNECOLOGY

## 2021-06-22 PROCEDURE — 86850 RBC ANTIBODY SCREEN: CPT | Performed by: OBSTETRICS & GYNECOLOGY

## 2021-06-22 PROCEDURE — 87389 HIV-1 AG W/HIV-1&-2 AB AG IA: CPT | Performed by: OBSTETRICS & GYNECOLOGY

## 2021-06-22 PROCEDURE — 86901 BLOOD TYPING SEROLOGIC RH(D): CPT | Performed by: OBSTETRICS & GYNECOLOGY

## 2021-06-22 PROCEDURE — 85027 COMPLETE CBC AUTOMATED: CPT | Performed by: OBSTETRICS & GYNECOLOGY

## 2021-06-22 PROCEDURE — 87086 URINE CULTURE/COLONY COUNT: CPT | Performed by: OBSTETRICS & GYNECOLOGY

## 2021-06-22 PROCEDURE — 86762 RUBELLA ANTIBODY: CPT | Performed by: OBSTETRICS & GYNECOLOGY

## 2021-06-22 PROCEDURE — 86900 BLOOD TYPING SEROLOGIC ABO: CPT | Performed by: OBSTETRICS & GYNECOLOGY

## 2021-06-23 LAB
ABO + RH BLD: NORMAL
ABO + RH BLD: NORMAL
BACTERIA SPEC CULT: NORMAL
BLD GP AB SCN SERPL QL: NORMAL
BLOOD BANK CMNT PATIENT-IMP: NORMAL
HBV SURFACE AG SERPL QL IA: NONREACTIVE
HIV 1+2 AB+HIV1 P24 AG SERPL QL IA: NONREACTIVE
Lab: NORMAL
RUBV IGG SERPL IA-ACNC: 17 IU/ML
SPECIMEN EXP DATE BLD: NORMAL
SPECIMEN SOURCE: NORMAL
T PALLIDUM AB SER QL: NONREACTIVE

## 2021-06-24 NOTE — RESULT ENCOUNTER NOTE
Inform patient that her dating ultrasound is normal. Her due date will be changed to 1/18/22 from her LMP due date on 1/27/22.     David Boykin MD

## 2021-07-01 ENCOUNTER — PRENATAL OFFICE VISIT (OUTPATIENT)
Dept: OBGYN | Facility: CLINIC | Age: 26
End: 2021-07-01
Payer: COMMERCIAL

## 2021-07-01 VITALS
HEART RATE: 74 BPM | BODY MASS INDEX: 21.65 KG/M2 | HEIGHT: 60 IN | SYSTOLIC BLOOD PRESSURE: 94 MMHG | WEIGHT: 110.3 LBS | DIASTOLIC BLOOD PRESSURE: 52 MMHG

## 2021-07-01 DIAGNOSIS — Z34.91 NORMAL PREGNANCY IN FIRST TRIMESTER: Primary | ICD-10-CM

## 2021-07-01 LAB — MISCELLANEOUS TEST: NORMAL

## 2021-07-01 PROCEDURE — 87491 CHLMYD TRACH DNA AMP PROBE: CPT | Performed by: OBSTETRICS & GYNECOLOGY

## 2021-07-01 PROCEDURE — 99207 PR FIRST OB VISIT: CPT | Performed by: OBSTETRICS & GYNECOLOGY

## 2021-07-01 PROCEDURE — 87591 N.GONORRHOEAE DNA AMP PROB: CPT | Performed by: OBSTETRICS & GYNECOLOGY

## 2021-07-01 ASSESSMENT — MIFFLIN-ST. JEOR: SCORE: 1161.82

## 2021-07-01 NOTE — PROGRESS NOTES
Saskia is a 26 year old  @ 11w2d by 10w0d ultrsaound, presents for new ob visit.      She has no complaints.     ROS: Ten point review of systems was reviewed and negative except the above.      OBhx:  x 1 (induced at term for oligohydramnios)   Gyne: Denies hx of abnormal Pap smears. Reports hx of genital herpes.       Past Medical History:   Diagnosis Date     Genital herpes      IUD (intrauterine device) in place 2016    Removed 2020     No active medical problems      Varicella      Past Surgical History:   Procedure Laterality Date     INSERT INTRAUTERINE DEVICE  2016    [facility]     ORTHOPEDIC SURGERY      left foot     Patient Active Problem List    Diagnosis Date Noted     NO ACTIVE PROBLEMS 2015     Priority: Medium      No Known Allergies  Prenatal Vit-Fe Fumarate-FA (PRENATAL VITAMIN) 27-0.8 MG TABS,     No current facility-administered medications on file prior to visit.       Past Medical History of Father of Baby:   No significant medical history. Not the FOB of patient's first child. He has never fathered a child.     Physical Exam: BP 94/52   Pulse 74   Wt 50 kg (110 lb 4.8 oz)   LMP 2021 (Exact Date)   BMI 21.54 kg/m    General: Well developed, well nourished female  Skin: No lesions, Warm, moist and No cyanosis or pallor  HEENT: Atraumatic, normocephalic  Neck: Supple,no adenopathy,thyroid normal  Chest: Clear to auscultation  Heart: Regular rate, rhythm  Breasts: deferred   Abdomen: Soft, flat, non-tender   Extremities: No cyanosis, clubbing, warm and well perfused and No edema  Neurological: Mental Status Normal and Station and Gait Normal   Perineum: deferred   Vulva: deferred  Vagina: deferred  Cervix: deferred  Uterus: deferred   Adnexa: deferred  Rectum: deferred   Bony Pelvis: Adequate based on hx of         A/P 26 year old  at 11w2d     1. Discussed physician coverage, tertiary support, diet, exercise, weight gain, schedule of visits,  routine and indicated ultrasounds, and childbirth education.    2. Options for  testing for chromosomal and birth defects were discussed with the patient including nuchal lucency/blood marker testing in the first trimester and quad screening and/or Level 2 ultrasound in the second trimester.  We discussed that these are screening tests and not diagnostic tests and that false positives and negatives are a distinct possibility.  We discussed that follow up diagnostic testing would include chorionic villus sampling or amniocentesis depending on gestational age. Also, discussed cell free DNA testing and patient agrees to proceed with this.     3. Hx of genital herpes  -- reviewed suppressive therapy at 36 weeks gestation to prevent outbreak at time of vaginal delivery     4. Prenatal labs reviewed and normal. Will defer Pap smear to postpartum visit. GC, Chlamydia collected today.     5. Prenatal Vitamins encouraged to continue    6. RTC in 4 weeks. SAB precautions reviewed    David Boykin MD  [facility]

## 2021-07-01 NOTE — NURSING NOTE
Chief Complaint   Patient presents with     Prenatal Care     First OB visit, 11 weeks 2 days. No c/o VB, cramping. Patient would like to do [employer]       Initial BP 94/52   Pulse 74   Wt 50 kg (110 lb 4.8 oz)   LMP 2021 (Exact Date)   BMI 21.54 kg/m   Estimated body mass index is 21.54 kg/m  as calculated from the following:    Height as of 19: 1.524 m (5').    Weight as of this encounter: 50 kg (110 lb 4.8 oz).  BP completed using cuff size: regular    Questioned patient about current smoking habits.  Pt. quit smoking some time ago.          The following HM Due: NONE      Solomon Villa CMA

## 2021-07-02 DIAGNOSIS — O98.811 CHLAMYDIA INFECTION AFFECTING PREGNANCY IN FIRST TRIMESTER: Primary | ICD-10-CM

## 2021-07-02 DIAGNOSIS — A74.9 CHLAMYDIA INFECTION AFFECTING PREGNANCY IN FIRST TRIMESTER: Primary | ICD-10-CM

## 2021-07-02 RX ORDER — AZITHROMYCIN 500 MG/1
1000 TABLET, FILM COATED ORAL DAILY
Qty: 2 TABLET | Refills: 0 | Status: SHIPPED | OUTPATIENT
Start: 2021-07-02 | End: 2021-09-03

## 2021-07-02 NOTE — PROGRESS NOTES
Called patient to inform her about her chlamydia infection.   Recommended no intercourse x 3 weeks to allow and test of cure in 3 weeks to ensure eradication of infection. Advised to patient that significant other also seek medical treatment. Patient conveys understanding of instructions. All questions and concerns addressed.     David Boykin MD

## 2021-07-08 LAB — LAB SCANNED RESULT: NORMAL

## 2021-08-04 ENCOUNTER — HOSPITAL ENCOUNTER (EMERGENCY)
Facility: CLINIC | Age: 26
Discharge: HOME | End: 2021-08-04
Attending: EMERGENCY MEDICINE | Admitting: EMERGENCY MEDICINE
Payer: COMMERCIAL

## 2021-08-04 VITALS
DIASTOLIC BLOOD PRESSURE: 63 MMHG | OXYGEN SATURATION: 100 % | RESPIRATION RATE: 18 BRPM | TEMPERATURE: 98.1 F | SYSTOLIC BLOOD PRESSURE: 116 MMHG | HEART RATE: 80 BPM

## 2021-08-04 DIAGNOSIS — B00.9 HERPETIC LESION: ICD-10-CM

## 2021-08-04 DIAGNOSIS — B02.9 HERPES ZOSTER WITHOUT COMPLICATION: ICD-10-CM

## 2021-08-04 PROCEDURE — 99283 EMERGENCY DEPT VISIT LOW MDM: CPT

## 2021-08-04 PROCEDURE — 87529 HSV DNA AMP PROBE: CPT | Performed by: EMERGENCY MEDICINE

## 2021-08-04 PROCEDURE — 87529 HSV DNA AMP PROBE: CPT | Mod: 91

## 2021-08-04 RX ORDER — VALACYCLOVIR HYDROCHLORIDE 1 G/1
1000 TABLET, FILM COATED ORAL 2 TIMES DAILY
Qty: 14 TABLET | Refills: 0 | Status: SHIPPED | OUTPATIENT
Start: 2021-08-04 | End: 2021-08-17

## 2021-08-04 NOTE — ED TRIAGE NOTES
Burning, itching blisters on top of right foot. Started as small hive like bumps a week ago and is progressively getting worse. 16 weeks pregnant

## 2021-08-04 NOTE — ED PROVIDER NOTES
History   Chief Complaint:  Rash     The history is provided by the patient.      Saskia Ayon is a 26 year old female with history of genital herpes who presents with a rash. Patient started to have small hives on her right foot 4 days ago. These hive like bumps have been burning, itchy, and getting bigger in size. Patient initially thought this rash was due to a shoe irritation but it has been worsening so she decided to come into the ED. Patient is 16 weeks pregnant and states that she is otherwise healthy.     Review of Systems   Skin: Positive for rash (Right Foot).     Allergies:  The patient has no known allergies.     Medications:  The patient is not currently taking any prescribed medications.    Past Medical History:    Genital herpes  Varicella     Past Surgical History:    Insert intrauterine device  Orthopedic surgery     Family History:    Diabetes, Father  Kidney Cancer, Father    Social History:  PCP: [facility]  Presents to the ED with her significant other    Physical Exam     Patient Vitals for the past 24 hrs:   BP Temp Temp src Pulse Resp SpO2   08/04/21 1542 -- 98.1  F (36.7  C) Temporal -- -- --   08/04/21 1541 116/63 -- -- 80 18 100 %       Physical Exam  General: Patient is alert and interactive when I enter the room  Head:  The scalp, face, and head appear normal  Eyes:  Conjunctivae are normal  ENT:    The nose is normal    Pinnae are normal    External acoustic canals are normal  Neck:  Trachea midline  CV:  Pulses are normal.    Resp:  No respiratory distress   Abdomen:      Soft, non-tender, non-distended  Musc:  Normal muscular tone    No major joint effusions    No asymmetric leg swelling  Skin:  Purulent filled vesicles on dorsum of right foot, erythematous base  Neuro:  Speech is normal and fluent. Face is symmetric.     Moving all extremities well.   Psych: Awake. Alert.  Normal affect.  Appropriate interactions.      Emergency Department Course      Laboratory:  Herpes Simplex Virus 172 by PCR: Pending    Emergency Department Course:  Reviewed:  I reviewed the patient's nursing notes, vitals, past medical records, Care Everywhere.     Assessments:  1617 I performed an assessment and examination of the patient as noted above.      1644 Findings and plan explained to the Patient and significant other. Patient discharged home with instructions regarding supportive care, medications, and reasons to return. The importance of close follow-up was reviewed. The patient was prescribed Valtrex.     Disposition:  The patient was discharged to home.       Impression & Plan     Medical Decision Making:  Saskia Ayon is a 26 year old female who presents for evaluation of rash on her right foot.  This is clinically consistent with HSV with grouped vesicles on an erythematous base.  Would treat with valtrex bid as no contraindications.  HSV PCR was sent.  This could be shingles however it being an atypical presentation.  Regardless we will treat the same with a course of valacyclovir.  Encouraged following up with her gynecology.  If symptoms persist would recommend dermatology follow-up.  Covid-19  Saskia Ayon was evaluated during a global COVID-19 pandemic, which necessitated consideration that the patient might be at risk for infection with the SARS-CoV-2 virus that causes COVID-19.   Applicable protocols for evaluation were followed during the patient's care.     Diagnosis:    ICD-10-CM    1. Herpetic lesion  B00.9    2. Herpes zoster without complication  B02.9        Discharge Medications:  New Prescriptions    VALACYCLOVIR (VALTREX) 1000 MG TABLET    Take 1 tablet (1,000 mg) by mouth 2 times daily for 7 days       Scribe Disclosure:  I, Katarina Bishop, am serving as a scribe at 4:17 PM on 8/4/2021 to document services personally performed by Magda Stone MD based on my observations and the provider's statements to me.        Chase  MD Magda  08/04/21 1804

## 2021-08-05 ENCOUNTER — TELEPHONE (OUTPATIENT)
Dept: EMERGENCY MEDICINE | Facility: CLINIC | Age: 26
End: 2021-08-05

## 2021-08-05 LAB
HSV1 DNA SPEC QL NAA+PROBE: NOT DETECTED
HSV2 DNA SPEC QL NAA+PROBE: DETECTED

## 2021-08-05 NOTE — TELEPHONE ENCOUNTER
[facility] Emergency Department Lab result notification:    Reason for call  Inform pt of lab result  Lab Result  Final result for Herpes Simplex Virus 1 PCR is [NEGATIVE] and Herpes Simplex Virus 2 PCR is [POSITIVE].    [facility] Emergency Dept discharge antiviral medication (if prescribed): 8/4/21 Valacyclovir (VALTREX) 1000 mg tablet, 1 tablet (1,000 mg) by mouth 2 times daily for 7 days  Recommendations in Treatment per [facility] ED Lab Result Herpes Simplex Virus Protocol  ED visit Date: 6/4/21  Symptoms reported at ED visit Saskia Ayon is a 26 year old female who presents for evaluation of rash on her right foot.  This is clinically consistent with HSV with grouped vesicles on an erythematous base.  Would treat with valtrex bid as no contraindications.  HSV PCR was sent.  This could be shingles however it being an atypical presentation.  Regardless we will treat the same with a course of valacyclovir.  Encouraged following up with her gynecology.  If symptoms persist would recommend dermatology follow-up.  Covid-19  Saskia Ayon was evaluated during a global COVID-19 pandemic, which necessitated consideration that the patient might be at risk for infection with the SARS-CoV-2 virus that causes COVID-19.   Applicable protocols for evaluation were followed during the patient's care.      Diagnosis:      ICD-10-CM     1. Herpetic lesion  B00.9     2. Herpes zoster without complication        Miscellaneous information      Current symptoms  Sangeeta says she is doing ok. Has a history of genital herpes and is currently taking the Valtrex Rx given to her in the ED.  Recommendations/Instructions  Patient notified of lab result and treatment recommendations.  RN Advised to finish full course of antiviral as prescribed and drink plenty of fluids. To be sure her OB/GYN is aware this test result and herpes history. And follow up with your PCP as the ED provider  recommended.     Contact your PCP clinic or return to the Emergency department if your:    Symptoms return.    Symptoms worsen or other concerning symptom's.    Ginette Crowell RN  [facility]  Emergency Dept Lab Result RN  Ph# [phone]

## 2021-09-03 ENCOUNTER — PRENATAL OFFICE VISIT (OUTPATIENT)
Dept: OBGYN | Facility: CLINIC | Age: 26
End: 2021-09-03
Payer: COMMERCIAL

## 2021-09-03 VITALS
HEART RATE: 73 BPM | WEIGHT: 112.6 LBS | DIASTOLIC BLOOD PRESSURE: 52 MMHG | BODY MASS INDEX: 21.99 KG/M2 | SYSTOLIC BLOOD PRESSURE: 102 MMHG

## 2021-09-03 DIAGNOSIS — Z34.82 PRENATAL CARE, SUBSEQUENT PREGNANCY IN SECOND TRIMESTER: Primary | ICD-10-CM

## 2021-09-03 DIAGNOSIS — A74.9 CHLAMYDIA INFECTION AFFECTING PREGNANCY IN FIRST TRIMESTER: ICD-10-CM

## 2021-09-03 DIAGNOSIS — O98.811 CHLAMYDIA INFECTION AFFECTING PREGNANCY IN FIRST TRIMESTER: ICD-10-CM

## 2021-09-03 PROCEDURE — 99207 PR PRENATAL VISIT: CPT | Performed by: OBSTETRICS & GYNECOLOGY

## 2021-09-03 RX ORDER — AZITHROMYCIN 500 MG/1
1000 TABLET, FILM COATED ORAL DAILY
Qty: 2 TABLET | Refills: 0 | Status: SHIPPED | OUTPATIENT
Start: 2021-09-03 | End: 2021-12-31

## 2021-09-03 NOTE — NURSING NOTE
Chief Complaint   Patient presents with     Prenatal Care     20 weeks 3days   No 20 week U/S        Initial /52 (BP Location: Left arm, Cuff Size: Adult Regular)   Pulse 73   Wt 51.1 kg (112 lb 9.6 oz)   LMP 2021 (Exact Date)   Breastfeeding No   BMI 21.99 kg/m   Estimated body mass index is 21.99 kg/m  as calculated from the following:    Height as of 21: 1.524 m (5').    Weight as of this encounter: 51.1 kg (112 lb 9.6 oz).  BP completed using cuff size: regular    Questioned patient about current smoking habits.  Pt. has never smoked.      20w3d      The following HM Due: NONE      +FM daily   +U/S order today   Needs G/C prescription bienvenido Polanco, CMA on 9/3/2021 at 2:21 PM

## 2021-09-03 NOTE — PROGRESS NOTES
Doing well.   Even though she was informed of her Chlamydia infection, she has yet to take the prescription. She states the pharmacy did not have the prescription when she came to have it filled and she suspects they did not have since she did not go to get it for a long time from when it was prescribed.   Denies vaginal discharge or pelvic cramping. Her  has not been treated yet as well.   Denies VB, ctx, LOF. +FM  /52 (BP Location: Left arm, Cuff Size: Adult Regular)   Pulse 73   Wt 51.1 kg (112 lb 9.6 oz)   LMP 2021 (Exact Date)   Breastfeeding No   BMI 21.99 kg/m    General Appearance: NAD  Abdomen: Gravid, NT  Refer to flow sheet above.   A/P: 26 year old  at 20w3d  -- refilled Azithromycin prescription   -- anatomy ultrasound ordered  -- PTL precautions reviewed  RTC in 4 weeks    David Boykin MD  [facility]

## 2021-09-16 ENCOUNTER — ANCILLARY PROCEDURE (OUTPATIENT)
Dept: ULTRASOUND IMAGING | Facility: CLINIC | Age: 26
End: 2021-09-16
Attending: OBSTETRICS & GYNECOLOGY
Payer: MEDICAID

## 2021-09-16 DIAGNOSIS — Z34.82 PRENATAL CARE, SUBSEQUENT PREGNANCY IN SECOND TRIMESTER: ICD-10-CM

## 2021-09-16 PROCEDURE — 76805 OB US >/= 14 WKS SNGL FETUS: CPT | Performed by: OBSTETRICS & GYNECOLOGY

## 2021-10-01 ENCOUNTER — PRENATAL OFFICE VISIT (OUTPATIENT)
Dept: OBGYN | Facility: CLINIC | Age: 26
End: 2021-10-01

## 2021-10-01 VITALS — SYSTOLIC BLOOD PRESSURE: 96 MMHG | WEIGHT: 117 LBS | DIASTOLIC BLOOD PRESSURE: 50 MMHG | BODY MASS INDEX: 22.85 KG/M2

## 2021-10-01 DIAGNOSIS — Z34.82 PRENATAL CARE, SUBSEQUENT PREGNANCY IN SECOND TRIMESTER: Primary | ICD-10-CM

## 2021-10-01 PROCEDURE — 99207 PR PRENATAL VISIT: CPT | Performed by: OBSTETRICS & GYNECOLOGY

## 2021-10-01 NOTE — PROGRESS NOTES
Doing well.   Complains of lower pelvic cramps with ambulation.   Denies VB, ctx, LOF. +FM  BP 96/50 (BP Location: Left arm, Cuff Size: Adult Regular)   Wt 53.1 kg (117 lb)   LMP 2021 (Exact Date)   BMI 22.85 kg/m    General Appearance: NAD  Abdomen: Gravid, NT  Refer to flow sheet above.   A/P: 26 year old  at 24w3d  -- above concerns addressed, reassurance provided; offered maternity belt but patient declines at this time  -- hx of Chlamydia infection; states she tool the oral Azithromycin 1 week following the last visit; will plan of CARIDAD test next visit   -- PTL precautions reviewed  RTC in 4 weeks for 1 hr GCT, CBC and RPR     David Boykni MD  [facility]

## 2021-10-01 NOTE — NURSING NOTE
Chief Complaint   Patient presents with     Prenatal Care     24 weeks 3 days, c/o bilateral low pain when walking -denies contractions or urinary sx       Initial BP 96/50 (BP Location: Left arm, Cuff Size: Adult Regular)   Wt 53.1 kg (117 lb)   LMP 2021 (Exact Date)   BMI 22.85 kg/m   Estimated body mass index is 22.85 kg/m  as calculated from the following:    Height as of 21: 1.524 m (5').    Weight as of this encounter: 53.1 kg (117 lb).  BP completed using cuff size: regular    Questioned patient about current smoking habits.  Pt. quit smoking some time ago.          The following HM Due: NONE

## 2021-10-03 ENCOUNTER — HEALTH MAINTENANCE LETTER (OUTPATIENT)
Age: 26
End: 2021-10-03

## 2021-11-01 ENCOUNTER — PRENATAL OFFICE VISIT (OUTPATIENT)
Dept: OBGYN | Facility: CLINIC | Age: 26
End: 2021-11-01
Payer: MEDICAID

## 2021-11-01 VITALS — DIASTOLIC BLOOD PRESSURE: 54 MMHG | SYSTOLIC BLOOD PRESSURE: 102 MMHG | WEIGHT: 121.5 LBS | BODY MASS INDEX: 23.73 KG/M2

## 2021-11-01 DIAGNOSIS — Z34.82 PRENATAL CARE, SUBSEQUENT PREGNANCY IN SECOND TRIMESTER: Primary | ICD-10-CM

## 2021-11-01 DIAGNOSIS — K59.00 CONSTIPATION, UNSPECIFIED CONSTIPATION TYPE: ICD-10-CM

## 2021-11-01 DIAGNOSIS — O98.811 CHLAMYDIA INFECTION AFFECTING PREGNANCY IN FIRST TRIMESTER: ICD-10-CM

## 2021-11-01 DIAGNOSIS — A74.9 CHLAMYDIA INFECTION AFFECTING PREGNANCY IN FIRST TRIMESTER: ICD-10-CM

## 2021-11-01 LAB
ERYTHROCYTE [DISTWIDTH] IN BLOOD BY AUTOMATED COUNT: 11.6 % (ref 10–15)
HCT VFR BLD AUTO: 29.6 % (ref 35–47)
HGB BLD-MCNC: 9.9 G/DL (ref 11.7–15.7)
MCH RBC QN AUTO: 29.7 PG (ref 26.5–33)
MCHC RBC AUTO-ENTMCNC: 33.4 G/DL (ref 31.5–36.5)
MCV RBC AUTO: 89 FL (ref 78–100)
PLATELET # BLD AUTO: 296 10E3/UL (ref 150–450)
RBC # BLD AUTO: 3.33 10E6/UL (ref 3.8–5.2)
WBC # BLD AUTO: 7.1 10E3/UL (ref 4–11)

## 2021-11-01 PROCEDURE — 87591 N.GONORRHOEAE DNA AMP PROB: CPT | Performed by: OBSTETRICS & GYNECOLOGY

## 2021-11-01 PROCEDURE — 99207 PR PRENATAL VISIT: CPT | Performed by: OBSTETRICS & GYNECOLOGY

## 2021-11-01 PROCEDURE — 82952 GTT-ADDED SAMPLES: CPT | Performed by: OBSTETRICS & GYNECOLOGY

## 2021-11-01 PROCEDURE — 87491 CHLMYD TRACH DNA AMP PROBE: CPT | Performed by: OBSTETRICS & GYNECOLOGY

## 2021-11-01 PROCEDURE — 86780 TREPONEMA PALLIDUM: CPT | Performed by: OBSTETRICS & GYNECOLOGY

## 2021-11-01 PROCEDURE — 36415 COLL VENOUS BLD VENIPUNCTURE: CPT | Performed by: OBSTETRICS & GYNECOLOGY

## 2021-11-01 PROCEDURE — 85027 COMPLETE CBC AUTOMATED: CPT | Performed by: OBSTETRICS & GYNECOLOGY

## 2021-11-01 RX ORDER — POLYETHYLENE GLYCOL 3350 17 G/17G
1 POWDER, FOR SOLUTION ORAL DAILY
Qty: 507 G | Refills: 1 | Status: SHIPPED | OUTPATIENT
Start: 2021-11-01 | End: 2023-08-31

## 2021-11-01 NOTE — NURSING NOTE
Chief Complaint   Patient presents with     Prenatal Care     28 weeks 6 days        Initial /54 (BP Location: Left arm, Cuff Size: Adult Regular)   Wt 55.1 kg (121 lb 8 oz)   LMP 2021 (Exact Date)   Breastfeeding No   BMI 23.73 kg/m   Estimated body mass index is 23.73 kg/m  as calculated from the following:    Height as of 21: 1.524 m (5').    Weight as of this encounter: 55.1 kg (121 lb 8 oz).  BP completed using cuff size: regular    Questioned patient about current smoking habits.  Pt. quit smoking some time ago.    28w6d      The following HM Due: NONE      +FM daily   +Constipation       Jaelyn Polanco, CMA on 2021 at 2:12 PM

## 2021-11-01 NOTE — PROGRESS NOTES
Doing well.   C/o constipation.   Also, reports taking valacyclovir prescribed from a previous medical encounter to address prodromal symptoms (genital burning, itching) of genital herpes. She takes the medication for a couple of days and it seems to resolve the issue.   Inquires if she is still able to have a vaginal delivery with hx of genital herpes.    Denies VB, ctx, LOF. +FM  /54 (BP Location: Left arm, Cuff Size: Adult Regular)   Wt 55.1 kg (121 lb 8 oz)   LMP 2021 (Exact Date)   Breastfeeding No   BMI 23.73 kg/m    General Appearance: NAD  Abdomen: Gravid, NT  Refer to flow sheet above.   A/P: 26 year old  at 28w6d  -- above concerns addressed; reviewed episode therapy regimen and to call us if she has another episode of prodroma symptoms to get adequately treated; also reviewed initiation of suppressive therapy starting at 36 weeks gestation so as to prevent an outbreak and allow her to delivery vaginally  -- Miralax prescribed for constipation  -- hx of chlamydia in pregnancy; treated; treatment of cure with urine culture ordered  -- 1 hr GCT, CBC and RPR today  -- PTL precautions reviewed  RTC in 2 weeks    David Boykin MD  [facility]

## 2021-11-02 LAB
C TRACH DNA SPEC QL NAA+PROBE: NEGATIVE
GLUCOSE 1H P 50 G GLC PO SERPL-MCNC: 112 MG/DL (ref 70–129)
N GONORRHOEA DNA SPEC QL NAA+PROBE: NEGATIVE
T PALLIDUM AB SER QL: NONREACTIVE

## 2021-11-15 ENCOUNTER — PRENATAL OFFICE VISIT (OUTPATIENT)
Dept: OBGYN | Facility: CLINIC | Age: 26
End: 2021-11-15

## 2021-11-15 VITALS
BODY MASS INDEX: 23.98 KG/M2 | WEIGHT: 122.8 LBS | SYSTOLIC BLOOD PRESSURE: 104 MMHG | HEART RATE: 102 BPM | DIASTOLIC BLOOD PRESSURE: 56 MMHG

## 2021-11-15 DIAGNOSIS — Z34.83 PRENATAL CARE, SUBSEQUENT PREGNANCY IN THIRD TRIMESTER: Primary | ICD-10-CM

## 2021-11-15 PROCEDURE — 99207 PR PRENATAL VISIT: CPT | Performed by: OBSTETRICS & GYNECOLOGY

## 2021-11-15 NOTE — PROGRESS NOTES
Doing well.   No complaints.   Denies VB, ctx, LOF. +FM  /56 (BP Location: Left arm, Cuff Size: Adult Regular)   Pulse 102   Wt 55.7 kg (122 lb 12.8 oz)   LMP 2021 (Exact Date)   Breastfeeding No   BMI 23.98 kg/m    General Appearance: NAD  Abdomen: Gravid, NT  Refer to flow sheet above.   A/P: 26 year old  at 30w6d  -- PTL precautions reviewed  RTC in 2 weeks    David Boykin MD  [facility]

## 2021-11-15 NOTE — NURSING NOTE
Chief Complaint   Patient presents with     Prenatal Care     30 weeks 6 days         Initial /56 (BP Location: Left arm, Cuff Size: Adult Regular)   Pulse 102   Wt 55.7 kg (122 lb 12.8 oz)   LMP 2021 (Exact Date)   Breastfeeding No   BMI 23.98 kg/m   Estimated body mass index is 23.98 kg/m  as calculated from the following:    Height as of 21: 1.524 m (5').    Weight as of this encounter: 55.7 kg (122 lb 12.8 oz).  BP completed using cuff size: regular    Questioned patient about current smoking habits.  Pt. quit smoking some time ago.    30w6d      The following HM Due: NONE        +FM daily         Jaelyn Polanco, CMA on 11/15/2021 at 2:11 PM

## 2021-11-29 ENCOUNTER — PRENATAL OFFICE VISIT (OUTPATIENT)
Dept: OBGYN | Facility: CLINIC | Age: 26
End: 2021-11-29

## 2021-11-29 VITALS
BODY MASS INDEX: 24.15 KG/M2 | HEIGHT: 60 IN | DIASTOLIC BLOOD PRESSURE: 58 MMHG | WEIGHT: 123 LBS | SYSTOLIC BLOOD PRESSURE: 102 MMHG

## 2021-11-29 DIAGNOSIS — Z34.83 PRENATAL CARE, SUBSEQUENT PREGNANCY IN THIRD TRIMESTER: Primary | ICD-10-CM

## 2021-11-29 PROCEDURE — 99207 PR PRENATAL VISIT: CPT | Performed by: OBSTETRICS & GYNECOLOGY

## 2021-11-29 ASSESSMENT — MIFFLIN-ST. JEOR: SCORE: 1219.42

## 2021-11-29 NOTE — PROGRESS NOTES
Doing well.   No complaints.    Denies VB, ctx, LOF. +FM  /58 (BP Location: Left arm, Patient Position: Chair)   Ht 1.524 m (5')   Wt 55.8 kg (123 lb)   LMP 2021 (Exact Date)   Breastfeeding No   BMI 24.02 kg/m    General Appearance: NAD  Abdomen: Gravid, NT  Refer to flow sheet above.   A/P: 26 year old  at 32w6d  -- growth ultrasound ordered for size greater than dates  -- PTL precautions reviewed  RTC in 2 weeks    David Boykin MD  [facility]

## 2021-11-29 NOTE — NURSING NOTE
Chief Complaint   Patient presents with     Prenatal Care     32 6/7 weeks       Initial /58 (BP Location: Left arm, Patient Position: Chair)   Ht 1.524 m (5')   Wt 55.8 kg (123 lb)   LMP 2021 (Exact Date)   Breastfeeding No   BMI 24.02 kg/m   Estimated body mass index is 24.02 kg/m  as calculated from the following:    Height as of this encounter: 1.524 m (5').    Weight as of this encounter: 55.8 kg (123 lb).  BP completed using cuff size: regular    Questioned patient about current smoking habits.  Pt. has never smoked.          The following HM Due: NONE    +fetal movement  -swelling    Aimee Cope, CMA

## 2021-12-17 ENCOUNTER — PRENATAL OFFICE VISIT (OUTPATIENT)
Dept: OBGYN | Facility: CLINIC | Age: 26
End: 2021-12-17
Payer: MEDICAID

## 2021-12-17 ENCOUNTER — ANCILLARY PROCEDURE (OUTPATIENT)
Dept: ULTRASOUND IMAGING | Facility: CLINIC | Age: 26
End: 2021-12-17
Attending: OBSTETRICS & GYNECOLOGY
Payer: MEDICAID

## 2021-12-17 VITALS
HEART RATE: 86 BPM | WEIGHT: 127.7 LBS | BODY MASS INDEX: 24.94 KG/M2 | DIASTOLIC BLOOD PRESSURE: 64 MMHG | SYSTOLIC BLOOD PRESSURE: 108 MMHG

## 2021-12-17 DIAGNOSIS — Z34.83 PRENATAL CARE, SUBSEQUENT PREGNANCY IN THIRD TRIMESTER: ICD-10-CM

## 2021-12-17 DIAGNOSIS — Z34.83 PRENATAL CARE, SUBSEQUENT PREGNANCY IN THIRD TRIMESTER: Primary | ICD-10-CM

## 2021-12-17 PROCEDURE — 76816 OB US FOLLOW-UP PER FETUS: CPT | Performed by: OBSTETRICS & GYNECOLOGY

## 2021-12-17 PROCEDURE — 99207 PR PRENATAL VISIT: CPT | Performed by: OBSTETRICS & GYNECOLOGY

## 2021-12-17 NOTE — NURSING NOTE
Chief Complaint   Patient presents with     Prenatal Care     35 weeks 3 days    U/S today        Initial /64 (BP Location: Left arm, Cuff Size: Adult Regular)   Pulse 86   Wt 57.9 kg (127 lb 11.2 oz)   LMP 2021 (Exact Date)   Breastfeeding No   BMI 24.94 kg/m   Estimated body mass index is 24.94 kg/m  as calculated from the following:    Height as of 21: 1.524 m (5').    Weight as of this encounter: 57.9 kg (127 lb 11.2 oz).  BP completed using cuff size: regular    Questioned patient about current smoking habits.  Pt. quit smoking some time ago.    35w3d      The following HM Due: NONE      +FM daily   +Some contractions       Jaelyn Polanco, CMA on 2021 at 1:40 PM

## 2021-12-17 NOTE — PROGRESS NOTES
Doing well.   No complaints. She had one prolonged contraction at midnight last night.   Denies VB, LOF. +FM  /64 (BP Location: Left arm, Cuff Size: Adult Regular)   Pulse 86   Wt 57.9 kg (127 lb 11.2 oz)   LMP 2021 (Exact Date)   Breastfeeding No   BMI 24.94 kg/m    General Appearance: NAD  Abdomen: Gravid, NT  Refer to flow sheet above.   A/P: 26 year old  at 35w3d  -- PTL precautions reviewed  -- hx of genital herpes: on suppressive therapy   RTC in 2 weeks for GBS collection and cervix check    David Boykin MD  [facility]

## 2021-12-23 ENCOUNTER — PRENATAL OFFICE VISIT (OUTPATIENT)
Dept: OBGYN | Facility: CLINIC | Age: 26
End: 2021-12-23
Payer: MEDICAID

## 2021-12-23 VITALS — SYSTOLIC BLOOD PRESSURE: 118 MMHG | DIASTOLIC BLOOD PRESSURE: 60 MMHG | WEIGHT: 129.1 LBS | BODY MASS INDEX: 25.21 KG/M2

## 2021-12-23 DIAGNOSIS — Z86.19 HX OF HERPES GENITALIS: ICD-10-CM

## 2021-12-23 DIAGNOSIS — Z34.83 PRENATAL CARE, SUBSEQUENT PREGNANCY IN THIRD TRIMESTER: Primary | ICD-10-CM

## 2021-12-23 PROCEDURE — 99207 PR PRENATAL VISIT: CPT | Performed by: OBSTETRICS & GYNECOLOGY

## 2021-12-23 PROCEDURE — 87653 STREP B DNA AMP PROBE: CPT | Performed by: OBSTETRICS & GYNECOLOGY

## 2021-12-23 RX ORDER — ACYCLOVIR 400 MG/1
400 TABLET ORAL 3 TIMES DAILY
Qty: 60 TABLET | Refills: 2 | Status: SHIPPED | OUTPATIENT
Start: 2021-12-23 | End: 2023-09-27

## 2021-12-23 NOTE — NURSING NOTE
Chief Complaint   Patient presents with     Prenatal Care     36 weeks 2 days   GBS= Due        Initial /60 (BP Location: Right arm, Cuff Size: Adult Regular)   Wt 58.6 kg (129 lb 1.6 oz)   LMP 2021 (Exact Date)   Breastfeeding No   BMI 25.21 kg/m   Estimated body mass index is 25.21 kg/m  as calculated from the following:    Height as of 21: 1.524 m (5').    Weight as of this encounter: 58.6 kg (129 lb 1.6 oz).  BP completed using cuff size: regular    Questioned patient about current smoking habits.  Pt. quit smoking some time ago.    36w2d      The following HM Due: NONE        +FM daily  +U/S  2021       Jaelyn Polanco, CMA on 2021 at 1:41 PM

## 2021-12-23 NOTE — PROGRESS NOTES
Doing well.   No complaints.   Denies VB, ctx, LOF. +FM  /60 (BP Location: Right arm, Cuff Size: Adult Regular)   Wt 58.6 kg (129 lb 1.6 oz)   LMP 2021 (Exact Date)   Breastfeeding No   BMI 25.21 kg/m    General Appearance: NAD  Abdomen: Gravid, NT  Refer to flow sheet above.   A/P: 26 year old  at 36w2d  -- GBS collected today  -- hx of genital herpes: suppressive therapy prescribed and instructed to start today  -- labor precautions reviewed  RTC in 1 week     David Boykin MD  [facility]

## 2021-12-24 LAB
GP B STREP DNA SPEC QL NAA+PROBE: NEGATIVE
PATIENT PENICILLIN, AMOXICILLIN, CEPHALOSPORINS ALLERGY: NO

## 2021-12-31 ENCOUNTER — PRENATAL OFFICE VISIT (OUTPATIENT)
Dept: OBGYN | Facility: CLINIC | Age: 26
End: 2021-12-31
Payer: MEDICAID

## 2021-12-31 VITALS — DIASTOLIC BLOOD PRESSURE: 66 MMHG | WEIGHT: 127.3 LBS | SYSTOLIC BLOOD PRESSURE: 110 MMHG | BODY MASS INDEX: 24.86 KG/M2

## 2021-12-31 DIAGNOSIS — B00.9 HSV-2 INFECTION COMPLICATING PREGNANCY, THIRD TRIMESTER: Primary | ICD-10-CM

## 2021-12-31 DIAGNOSIS — O98.513 HSV-2 INFECTION COMPLICATING PREGNANCY, THIRD TRIMESTER: Primary | ICD-10-CM

## 2021-12-31 PROCEDURE — 59426 ANTEPARTUM CARE ONLY: CPT | Performed by: OBSTETRICS & GYNECOLOGY

## 2021-12-31 PROCEDURE — 99207 PR COMPLICATED OB VISIT: CPT | Performed by: OBSTETRICS & GYNECOLOGY

## 2021-12-31 NOTE — PROGRESS NOTES
Pt states some increased UCs.  No VB, SROM.  Fetus active.  Has not started Acyclovir yet, will  today.  No prodrome or lesions.  Cx-4/80/-2/Vtx.  Fetal movement counts BID, rupture of membranes/labor precautions reviewed.  RTC 1 week(s).    Encounter Diagnosis   Name Primary?     HSV-2 infection complicating pregnancy, third trimester Yes       Risk factors listed above are stable and being addressed as noted.    Houston Logan MD  [facility] [place]

## 2022-01-05 ENCOUNTER — PRENATAL OFFICE VISIT (OUTPATIENT)
Dept: OBGYN | Facility: CLINIC | Age: 27
End: 2022-01-05
Payer: COMMERCIAL

## 2022-01-05 VITALS — DIASTOLIC BLOOD PRESSURE: 62 MMHG | WEIGHT: 132 LBS | BODY MASS INDEX: 25.78 KG/M2 | SYSTOLIC BLOOD PRESSURE: 108 MMHG

## 2022-01-05 DIAGNOSIS — O26.893 VAGINAL DISCHARGE DURING PREGNANCY IN THIRD TRIMESTER: ICD-10-CM

## 2022-01-05 DIAGNOSIS — B00.9 HSV-2 INFECTION COMPLICATING PREGNANCY, THIRD TRIMESTER: Primary | ICD-10-CM

## 2022-01-05 DIAGNOSIS — N89.8 VAGINAL DISCHARGE DURING PREGNANCY IN THIRD TRIMESTER: ICD-10-CM

## 2022-01-05 DIAGNOSIS — O98.513 HSV-2 INFECTION COMPLICATING PREGNANCY, THIRD TRIMESTER: Primary | ICD-10-CM

## 2022-01-05 LAB — CRYSTALS AMN MICRO: NORMAL

## 2022-01-05 PROCEDURE — 99212 OFFICE O/P EST SF 10 MIN: CPT | Performed by: OBSTETRICS & GYNECOLOGY

## 2022-01-05 NOTE — NURSING NOTE
Chief Complaint   Patient presents with     Prenatal Care     38 weeks 1 day- concerns of contractions        Initial /62 (BP Location: Right arm, Patient Position: Sitting, Cuff Size: Adult Regular)   Wt 59.9 kg (132 lb)   LMP 2021 (Exact Date)   BMI 25.78 kg/m   Estimated body mass index is 25.78 kg/m  as calculated from the following:    Height as of 21: 1.524 m (5').    Weight as of this encounter: 59.9 kg (132 lb).  BP completed using cuff size: regular    Questioned patient about current smoking habits.  Pt. has never smoked.          The following HM Due: NONE    38w1d  Cornelio Combs CMA

## 2022-01-05 NOTE — PROGRESS NOTES
Pt c/o some occasional leakage of pink-tinged fluid 2 days ago one episode before bedtime, and once around 3PM yest and again last night.  In between she doesn't leak. No bright red VB, some intermittent UCs.  Fetus active.  SSE-Pool-Neg, Fern sent.  Cx-/-1/Vtx.  If SROM r/o'd, RTC 2 days as scheduled.  If SROM confirmed, will have Pt go to L&D for induction.  Fetal movement counts BID,  labor/premature rupture of membranes precautions reviewed.     Encounter Diagnoses   Name Primary?     HSV-2 infection complicating pregnancy, third trimester Yes     Vaginal discharge during pregnancy in third trimester        Risk factors listed above are stable and being addressed as noted.    Houston Logan MD  [facility] [place]

## 2022-01-07 ENCOUNTER — PRENATAL OFFICE VISIT (OUTPATIENT)
Dept: OBGYN | Facility: CLINIC | Age: 27
End: 2022-01-07
Payer: COMMERCIAL

## 2022-01-07 VITALS — WEIGHT: 133.3 LBS | SYSTOLIC BLOOD PRESSURE: 120 MMHG | DIASTOLIC BLOOD PRESSURE: 68 MMHG | BODY MASS INDEX: 26.03 KG/M2

## 2022-01-07 DIAGNOSIS — B00.9 HSV-2 INFECTION COMPLICATING PREGNANCY, THIRD TRIMESTER: Primary | ICD-10-CM

## 2022-01-07 DIAGNOSIS — O98.513 HSV-2 INFECTION COMPLICATING PREGNANCY, THIRD TRIMESTER: Primary | ICD-10-CM

## 2022-01-07 PROCEDURE — 99212 OFFICE O/P EST SF 10 MIN: CPT | Performed by: OBSTETRICS & GYNECOLOGY

## 2022-01-07 NOTE — PROGRESS NOTES
No c/o's except occas UCs.  Cx-4/80/0/Vtx.  On Acyclovir.  Will tentatively schedule for elective induction if L&D has space for 1/11/2022.  COVID test ordered.  Fetal movement counts BID, rupture of membranes/labor precautions reviewed.    Encounter Diagnosis   Name Primary?     HSV-2 infection complicating pregnancy, third trimester Yes       Risk factors listed above are stable and being addressed as noted.    Houston Logan MD  [facility] [place]

## 2022-01-07 NOTE — NURSING NOTE
Chief Complaint   Patient presents with     Prenatal Care     38 weeks 3 days   GBS=neg       Initial /68 (BP Location: Right arm, Cuff Size: Adult Regular)   Wt 60.5 kg (133 lb 4.8 oz)   LMP 2021 (Exact Date)   Breastfeeding No   BMI 26.03 kg/m   Estimated body mass index is 26.03 kg/m  as calculated from the following:    Height as of 21: 1.524 m (5').    Weight as of this encounter: 60.5 kg (133 lb 4.8 oz).  BP completed using cuff size: regular    Questioned patient about current smoking habits.  Pt. quit smoking some time ago.    38w3d      The following HM Due: NONE      +FM daily   +Contractions       Jaelyn Polanco, CMA on 2022 at 10:30 AM

## 2022-01-09 ENCOUNTER — NURSE TRIAGE (OUTPATIENT)
Dept: NURSING | Facility: CLINIC | Age: 27
End: 2022-01-09
Payer: COMMERCIAL

## 2022-01-09 ENCOUNTER — HOSPITAL ENCOUNTER (OUTPATIENT)
Facility: CLINIC | Age: 27
Discharge: HOME | End: 2022-01-09
Attending: OBSTETRICS & GYNECOLOGY | Admitting: OBSTETRICS & GYNECOLOGY
Payer: COMMERCIAL

## 2022-01-09 LAB — SARS-COV-2 RNA RESP QL NAA+PROBE: POSITIVE

## 2022-01-09 PROCEDURE — 87635 SARS-COV-2 COVID-19 AMP PRB: CPT | Performed by: OBSTETRICS & GYNECOLOGY

## 2022-01-09 PROCEDURE — C9803 HOPD COVID-19 SPEC COLLECT: HCPCS

## 2022-01-09 NOTE — TELEPHONE ENCOUNTER
OB Triage Call      Is patient's OB/Midwife with the [facility] or [facility]? [facility]- Is patient's primary OB a Midwife? No- Proceed with triage.     Reason for call: Pt reports some intermittent pains in her back for the last hour.  No vaginal fluid leak or bleeding.  Good baby movement.  Pt says VE 2 days ago at 4 cm.     Assessment: Possible very early labor onset. Labor could proceed quickly on mutip already 4 cm once labor progresses.    Plan: Home Care for now.  Care Advice given per Labor Guideline. Pt encouraged to get phone andres for timing contractions and call back when contractions are regular for 1 hour or if any leaking of fluid.    Patient's primary OB Provider is Dr David Boykin.    Per protocol recommendations Patient to follow home care recommendations.      Is patient's delivering hospital on divert? No      38w5d    Estimated Date of Delivery: 2022        OB History    Para Term  AB Living   2 1 1 0 0 1   SAB IAB Ectopic Multiple Live Births   0 0 0 0 1      # Outcome Date GA Lbr Michael/2nd Weight Sex Delivery Anes PTL Lv   2 Current            1 Term 10/31/15 38w0d 00:56 / 00:19 2.489 kg (5 lb 7.8 oz) F Vag-Spont None N [place]      Name: Alis      Apgar1: 9  Apgar5: 9       Lab Results   Component Value Date    GBS  10/16/2015     Negative  No GBS DNA detected, presumed negative for GBS or number of bacteria may be   below the limit of detection of the assay.   Assay performed on incubated broth culture of specimen using [employer] real-time   PCR.            Ekta Drake RN 22 3:11 AM  [facility] Nurse Advisor    Reason for Disposition    [1] History of prior delivery (multipara) AND [2] contractions > 10 minutes, or for < 1 hour    Additional Information    Negative: Passed out (i.e., lost consciousness, collapsed and was not responding)    Negative: Shock suspected (e.g., cold/pale/clammy skin, too weak to stand, low BP, rapid pulse)    Negative:  "Difficult to awaken or acting confused (e.g., disoriented, slurred speech)    Negative: [1] SEVERE abdominal pain (e.g., excruciating) AND [2] constant AND [3] present > 1 hour    Negative: Severe bleeding (e.g., continuous red blood from vagina, or large blood clots)    Negative: Umbilical cord hanging out of the vagina (shiny, white, curled appearance, \"like telephone cord\")    Negative: Uncontrollable urge to push (i.e., feels like baby is coming out now)    Negative: Can see baby    Negative: Sounds like a life-threatening emergency to the triager    Negative: Pregnant < 37 weeks (i.e., )    Negative: [1] Uncertain delivery date AND [2] possibly pregnant < 37 weeks (i.e., )    Negative: [1] First baby (primipara) AND [2] contractions < 6 minutes apart  AND [3] present 2 hours    Negative: [1] History of prior delivery (multipara) AND [2] contractions < 10 minutes apart AND [3] present 1 hour    Negative: [1] History of rapid prior delivery AND [2] contractions < 10 minutes apart    Negative: [1] Leakage of fluid from vagina AND [2] green or brown in color    Negative: [1] Leakage of fluid from vagina AND [2] leakage started > 4 hours ago    Negative: Vaginal bleeding or spotting    Negative: Baby moving less today (e.g., kick count < 5 in 1 hour or < 10 in 2 hours)    Negative: Severe headache or headache that won't go away    Negative: New blurred vision or vision changes    Negative: MODERATE-SEVERE abdominal pain    Negative: [1] MILD abdominal pain (e.g., doesn't interfere with normal activities) AND [2] constant AND [3] present > 2 hours    Negative: Fever > 100.4 F (38.0 C)    Negative: [1] Leakage of fluid from vagina AND [2] leakage started < 4 hours ago    Negative: Patient sounds very sick or weak to the triager    Negative: [1] First baby (primipara) AND [2] contractions > 5 minutes apart, or for < 2 hours    Protocols used: PREGNANCY - LABOR-A-      "

## 2022-01-10 ENCOUNTER — TELEPHONE (OUTPATIENT)
Dept: OBGYN | Facility: CLINIC | Age: 27
End: 2022-01-10
Payer: COMMERCIAL

## 2022-01-10 PROBLEM — O98.513 COVID-19 AFFECTING PREGNANCY IN THIRD TRIMESTER: Status: ACTIVE | Noted: 2022-01-10

## 2022-01-10 PROBLEM — U07.1 COVID-19 AFFECTING PREGNANCY IN THIRD TRIMESTER: Status: ACTIVE | Noted: 2022-01-10

## 2022-01-10 NOTE — TELEPHONE ENCOUNTER
Coronavirus (COVID-19) Notification    Reason for call  Notify of POSITIVE  COVID-19 lab result, assess symptoms,  review [facility] recommendations    Lab Result   Lab test for 2019-nCoV rRt-PCR or SARS-COV-2 PCR  Oropharyngeal AND/OR nasopharyngeal swabs were POSITIVE for 2019-nCoV RNA [OR] SARS-COV-2 RNA (COVID-19) RNA     We have been unable to reach Patient by phone at this time to notify of their Positive COVID-19 result.  Left voicemail message requesting a call back to [phone] [facility] for results.        POSITIVE COVID-19 Letter sent.    Ana Naidu LPN

## 2022-01-15 ENCOUNTER — NURSE TRIAGE (OUTPATIENT)
Dept: NURSING | Facility: CLINIC | Age: 27
End: 2022-01-15
Payer: COMMERCIAL

## 2022-01-15 ENCOUNTER — HOSPITAL ENCOUNTER (INPATIENT)
Facility: CLINIC | Age: 27
LOS: 1 days | Discharge: HOME HEALTH | End: 2022-01-16
Attending: OBSTETRICS & GYNECOLOGY | Admitting: OBSTETRICS & GYNECOLOGY
Payer: COMMERCIAL

## 2022-01-15 DIAGNOSIS — O98.513 COVID-19 AFFECTING PREGNANCY IN THIRD TRIMESTER: ICD-10-CM

## 2022-01-15 DIAGNOSIS — U07.1 COVID-19 AFFECTING PREGNANCY IN THIRD TRIMESTER: ICD-10-CM

## 2022-01-15 PROBLEM — B00.9 HSV-2 INFECTION COMPLICATING PREGNANCY, THIRD TRIMESTER: Status: ACTIVE | Noted: 2021-12-31

## 2022-01-15 LAB
ABO/RH(D): NORMAL
AMPHETAMINES UR QL SCN: NORMAL
ANTIBODY SCREEN: NEGATIVE
CANNABINOIDS UR QL SCN: NORMAL
COCAINE UR QL: NORMAL
HOLD SPECIMEN: NORMAL
OPIATES UR QL SCN: NORMAL
PCP UR QL SCN: NORMAL
SPECIMEN EXPIRATION DATE: NORMAL
T PALLIDUM AB SER QL: NONREACTIVE

## 2022-01-15 PROCEDURE — 120N000001 HC R&B MED SURG/OB

## 2022-01-15 PROCEDURE — 86850 RBC ANTIBODY SCREEN: CPT | Performed by: OBSTETRICS & GYNECOLOGY

## 2022-01-15 PROCEDURE — 722N000001 HC LABOR CARE VAGINAL DELIVERY SINGLE

## 2022-01-15 PROCEDURE — 80307 DRUG TEST PRSMV CHEM ANLYZR: CPT | Performed by: OBSTETRICS & GYNECOLOGY

## 2022-01-15 PROCEDURE — 86901 BLOOD TYPING SEROLOGIC RH(D): CPT | Performed by: OBSTETRICS & GYNECOLOGY

## 2022-01-15 PROCEDURE — 250N000009 HC RX 250: Performed by: OBSTETRICS & GYNECOLOGY

## 2022-01-15 PROCEDURE — 86780 TREPONEMA PALLIDUM: CPT | Performed by: OBSTETRICS & GYNECOLOGY

## 2022-01-15 RX ORDER — MODIFIED LANOLIN
OINTMENT (GRAM) TOPICAL
Status: DISCONTINUED | OUTPATIENT
Start: 2022-01-15 | End: 2022-01-16 | Stop reason: HOSPADM

## 2022-01-15 RX ORDER — OXYTOCIN 10 [USP'U]/ML
10 INJECTION, SOLUTION INTRAMUSCULAR; INTRAVENOUS
Status: DISCONTINUED | OUTPATIENT
Start: 2022-01-15 | End: 2022-01-16 | Stop reason: HOSPADM

## 2022-01-15 RX ORDER — ONDANSETRON 2 MG/ML
4 INJECTION INTRAMUSCULAR; INTRAVENOUS EVERY 6 HOURS PRN
Status: DISCONTINUED | OUTPATIENT
Start: 2022-01-15 | End: 2022-01-15 | Stop reason: HOSPADM

## 2022-01-15 RX ORDER — PROCHLORPERAZINE MALEATE 10 MG
10 TABLET ORAL EVERY 6 HOURS PRN
Status: DISCONTINUED | OUTPATIENT
Start: 2022-01-15 | End: 2022-01-15 | Stop reason: HOSPADM

## 2022-01-15 RX ORDER — ACETAMINOPHEN 325 MG/1
650 TABLET ORAL EVERY 4 HOURS PRN
Status: DISCONTINUED | OUTPATIENT
Start: 2022-01-15 | End: 2022-01-16 | Stop reason: HOSPADM

## 2022-01-15 RX ORDER — MISOPROSTOL 200 UG/1
400 TABLET ORAL
Status: DISCONTINUED | OUTPATIENT
Start: 2022-01-15 | End: 2022-01-16 | Stop reason: HOSPADM

## 2022-01-15 RX ORDER — MISOPROSTOL 200 UG/1
400 TABLET ORAL
Status: DISCONTINUED | OUTPATIENT
Start: 2022-01-15 | End: 2022-01-15 | Stop reason: HOSPADM

## 2022-01-15 RX ORDER — CARBOPROST TROMETHAMINE 250 UG/ML
250 INJECTION, SOLUTION INTRAMUSCULAR
Status: DISCONTINUED | OUTPATIENT
Start: 2022-01-15 | End: 2022-01-15 | Stop reason: HOSPADM

## 2022-01-15 RX ORDER — METHYLERGONOVINE MALEATE 0.2 MG/ML
200 INJECTION INTRAVENOUS
Status: DISCONTINUED | OUTPATIENT
Start: 2022-01-15 | End: 2022-01-15 | Stop reason: HOSPADM

## 2022-01-15 RX ORDER — CARBOPROST TROMETHAMINE 250 UG/ML
250 INJECTION, SOLUTION INTRAMUSCULAR
Status: DISCONTINUED | OUTPATIENT
Start: 2022-01-15 | End: 2022-01-16 | Stop reason: HOSPADM

## 2022-01-15 RX ORDER — IBUPROFEN 800 MG/1
800 TABLET, FILM COATED ORAL EVERY 6 HOURS PRN
Status: DISCONTINUED | OUTPATIENT
Start: 2022-01-15 | End: 2022-01-16 | Stop reason: HOSPADM

## 2022-01-15 RX ORDER — ONDANSETRON 4 MG/1
4 TABLET, ORALLY DISINTEGRATING ORAL EVERY 6 HOURS PRN
Status: DISCONTINUED | OUTPATIENT
Start: 2022-01-15 | End: 2022-01-15 | Stop reason: HOSPADM

## 2022-01-15 RX ORDER — MISOPROSTOL 200 UG/1
800 TABLET ORAL
Status: DISCONTINUED | OUTPATIENT
Start: 2022-01-15 | End: 2022-01-16 | Stop reason: HOSPADM

## 2022-01-15 RX ORDER — TRANEXAMIC ACID 10 MG/ML
1 INJECTION, SOLUTION INTRAVENOUS EVERY 30 MIN PRN
Status: DISCONTINUED | OUTPATIENT
Start: 2022-01-15 | End: 2022-01-16 | Stop reason: HOSPADM

## 2022-01-15 RX ORDER — HYDROCORTISONE 2.5 %
CREAM (GRAM) TOPICAL 3 TIMES DAILY PRN
Status: DISCONTINUED | OUTPATIENT
Start: 2022-01-15 | End: 2022-01-16 | Stop reason: HOSPADM

## 2022-01-15 RX ORDER — OXYTOCIN/0.9 % SODIUM CHLORIDE 30/500 ML
340 PLASTIC BAG, INJECTION (ML) INTRAVENOUS CONTINUOUS PRN
Status: DISCONTINUED | OUTPATIENT
Start: 2022-01-15 | End: 2022-01-16 | Stop reason: HOSPADM

## 2022-01-15 RX ORDER — OXYTOCIN 10 [USP'U]/ML
10 INJECTION, SOLUTION INTRAMUSCULAR; INTRAVENOUS
Status: DISCONTINUED | OUTPATIENT
Start: 2022-01-15 | End: 2022-01-15 | Stop reason: CLARIF

## 2022-01-15 RX ORDER — OXYTOCIN 10 [USP'U]/ML
10 INJECTION, SOLUTION INTRAMUSCULAR; INTRAVENOUS
Status: DISCONTINUED | OUTPATIENT
Start: 2022-01-15 | End: 2022-01-15 | Stop reason: HOSPADM

## 2022-01-15 RX ORDER — BISACODYL 10 MG
10 SUPPOSITORY, RECTAL RECTAL DAILY PRN
Status: DISCONTINUED | OUTPATIENT
Start: 2022-01-15 | End: 2022-01-16 | Stop reason: HOSPADM

## 2022-01-15 RX ORDER — NALOXONE HYDROCHLORIDE 0.4 MG/ML
0.2 INJECTION, SOLUTION INTRAMUSCULAR; INTRAVENOUS; SUBCUTANEOUS
Status: DISCONTINUED | OUTPATIENT
Start: 2022-01-15 | End: 2022-01-15 | Stop reason: HOSPADM

## 2022-01-15 RX ORDER — DOCUSATE SODIUM 100 MG/1
100 CAPSULE, LIQUID FILLED ORAL DAILY
Status: DISCONTINUED | OUTPATIENT
Start: 2022-01-15 | End: 2022-01-16 | Stop reason: HOSPADM

## 2022-01-15 RX ORDER — KETOROLAC TROMETHAMINE 30 MG/ML
30 INJECTION, SOLUTION INTRAMUSCULAR; INTRAVENOUS
Status: DISCONTINUED | OUTPATIENT
Start: 2022-01-15 | End: 2022-01-15 | Stop reason: CLARIF

## 2022-01-15 RX ORDER — OXYTOCIN/0.9 % SODIUM CHLORIDE 30/500 ML
100-340 PLASTIC BAG, INJECTION (ML) INTRAVENOUS CONTINUOUS PRN
Status: DISCONTINUED | OUTPATIENT
Start: 2022-01-15 | End: 2022-01-15 | Stop reason: CLARIF

## 2022-01-15 RX ORDER — NALOXONE HYDROCHLORIDE 0.4 MG/ML
0.4 INJECTION, SOLUTION INTRAMUSCULAR; INTRAVENOUS; SUBCUTANEOUS
Status: DISCONTINUED | OUTPATIENT
Start: 2022-01-15 | End: 2022-01-15 | Stop reason: HOSPADM

## 2022-01-15 RX ORDER — IBUPROFEN 600 MG/1
600 TABLET, FILM COATED ORAL
Status: DISCONTINUED | OUTPATIENT
Start: 2022-01-15 | End: 2022-01-15 | Stop reason: CLARIF

## 2022-01-15 RX ORDER — METHYLERGONOVINE MALEATE 0.2 MG/ML
200 INJECTION INTRAVENOUS
Status: DISCONTINUED | OUTPATIENT
Start: 2022-01-15 | End: 2022-01-16 | Stop reason: HOSPADM

## 2022-01-15 RX ORDER — OXYTOCIN/0.9 % SODIUM CHLORIDE 30/500 ML
340 PLASTIC BAG, INJECTION (ML) INTRAVENOUS CONTINUOUS PRN
Status: DISCONTINUED | OUTPATIENT
Start: 2022-01-15 | End: 2022-01-15 | Stop reason: HOSPADM

## 2022-01-15 RX ORDER — TRANEXAMIC ACID 10 MG/ML
1 INJECTION, SOLUTION INTRAVENOUS EVERY 30 MIN PRN
Status: DISCONTINUED | OUTPATIENT
Start: 2022-01-15 | End: 2022-01-15 | Stop reason: HOSPADM

## 2022-01-15 RX ORDER — METOCLOPRAMIDE HYDROCHLORIDE 5 MG/ML
10 INJECTION INTRAMUSCULAR; INTRAVENOUS EVERY 6 HOURS PRN
Status: DISCONTINUED | OUTPATIENT
Start: 2022-01-15 | End: 2022-01-15 | Stop reason: HOSPADM

## 2022-01-15 RX ORDER — FENTANYL CITRATE 50 UG/ML
50-100 INJECTION, SOLUTION INTRAMUSCULAR; INTRAVENOUS
Status: DISCONTINUED | OUTPATIENT
Start: 2022-01-15 | End: 2022-01-15 | Stop reason: HOSPADM

## 2022-01-15 RX ORDER — PROCHLORPERAZINE 25 MG
25 SUPPOSITORY, RECTAL RECTAL EVERY 12 HOURS PRN
Status: DISCONTINUED | OUTPATIENT
Start: 2022-01-15 | End: 2022-01-15 | Stop reason: HOSPADM

## 2022-01-15 RX ORDER — MISOPROSTOL 200 UG/1
800 TABLET ORAL
Status: DISCONTINUED | OUTPATIENT
Start: 2022-01-15 | End: 2022-01-15 | Stop reason: HOSPADM

## 2022-01-15 RX ORDER — METOCLOPRAMIDE 10 MG/1
10 TABLET ORAL EVERY 6 HOURS PRN
Status: DISCONTINUED | OUTPATIENT
Start: 2022-01-15 | End: 2022-01-15 | Stop reason: HOSPADM

## 2022-01-15 RX ADMIN — Medication 340 ML/HR: at 07:31

## 2022-01-15 ASSESSMENT — ACTIVITIES OF DAILY LIVING (ADL)
ADLS_ACUITY_SCORE: 6
ADLS_ACUITY_SCORE: 14
ADLS_ACUITY_SCORE: 6

## 2022-01-15 NOTE — PLAN OF CARE
Patient up with SBA to the bathroom. Was able to urinate spontaneously. Was able to collect a urine specimen for tox screen for Precip delivery. Fundus firm 1 below , scant vaginal flow with fundal checks. Patient denies any medications for pain. Have placed an order for T-Pump for comfort. Patient is COVID + with no symptoms. Will transfer to  soon.  will accompany her.

## 2022-01-15 NOTE — TELEPHONE ENCOUNTER
"OB Triage Call    Is patient's OB/Midwife with the [facility]? [facility]- Proceed with triage     Reason for call: Contractions/ gush of fluid    Assessment: \"I'm not sure if my water broke.\" Sangeeta calling this morning and reporting frequent urination throughout the night. This morning around 5:45am she felt a larger gush of fluid that was clear liquid/ mucous like. Sangeeta denies urge to push or vaginal bleeding and is experiencing relief between contractions. She has not timed her contractions.     Plan: Sangeeta directed to be evaluated at L&D at [facility]. Patient was advised to enter through ED entrance.     Patient plans to deliver at [facility]     Patient's primary OB Provider is Dr. Logan.      Per protocol recommendations Patient to be evaluated in L&D. Patient's primary OB is [facility].  Labor and delivery at [facility] ([phone]) notified of patient's pending arrival.  Report given to SUE.    Is patient's delivering hospital on divert? No      39w4d    Estimated Date of Delivery: 2022        OB History    Para Term  AB Living   2 1 1 0 0 1   SAB IAB Ectopic Multiple Live Births   0 0 0 0 1      # Outcome Date GA Lbr Michael/2nd Weight Sex Delivery Anes PTL Lv   2 Current            1 Term 10/31/15 38w0d 00:56 / 00:19 2.489 kg (5 lb 7.8 oz) F Vag-Spont None N [place]      Name: Alis      Apgar1: 9  Apgar5: 9       Lab Results   Component Value Date    GBS  10/16/2015     Negative  No GBS DNA detected, presumed negative for GBS or number of bacteria may be   below the limit of detection of the assay.   Assay performed on incubated broth culture of specimen using "[employer] real-time   PCR.            Grace Esquivel RN 01/15/22 6:24 AM  [facility] Nurse Advisor    Reason for Disposition    MODERATE-SEVERE abdominal pain    Additional Information    Negative: Passed out (i.e., lost consciousness, collapsed and was not responding)    Negative: Shock suspected " "(e.g., cold/pale/clammy skin, too weak to stand, low BP, rapid pulse)    Negative: Difficult to awaken or acting confused (e.g., disoriented, slurred speech)    Negative: [1] SEVERE abdominal pain (e.g., excruciating) AND [2] constant AND [3] present > 1 hour    Negative: Severe bleeding (e.g., continuous red blood from vagina, or large blood clots)    Negative: Umbilical cord hanging out of the vagina (shiny, white, curled appearance, \"like telephone cord\")    Negative: Uncontrollable urge to push (i.e., feels like baby is coming out now)    Negative: Can see baby    Negative: Sounds like a life-threatening emergency to the triager    Negative: [1] First baby (primipara) AND [2] contractions < 6 minutes apart  AND [3] present 2 hours    Negative: [1] History of prior delivery (multipara) AND [2] contractions < 10 minutes apart AND [3] present 1 hour    Negative: [1] History of rapid prior delivery AND [2] contractions < 10 minutes apart    Negative: [1] Leakage of fluid from vagina AND [2] green or brown in color    Negative: [1] Leakage of fluid from vagina AND [2] leakage started > 4 hours ago    Negative: Vaginal bleeding or spotting    Negative: Baby moving less today (e.g., kick count < 5 in 1 hour or < 10 in 2 hours)    Negative: Severe headache or headache that won't go away    Negative: New blurred vision or vision changes    Protocols used: PREGNANCY - LABOR-A-    Grace Esquivel RN-BSN  [facility]   "

## 2022-01-15 NOTE — L&D DELIVERY NOTE
Called as emergency in-house physician as pt arrived with complete cervical dilation and urge to push.    Saskia Ayon is a 26 year old  at 39 4/7 weeks who reports her pregnancy was uncomplicated.  She is on valtrex.  No signs or symptoms of active lesion.  She did have COVID a week ago and states she tested negative at home recently.  She reports SROM at 0545 and rapid onset of labor.    When I arrived, pt with complete dilation and the urge to push.  I quickly gowned and gloved, was unaware of COVID hx until delivery about to occur.  She breathed through 2 ctx while IV started, and then pushed to have an  of a male fetus, vigorous and crying. Weight and apgars pending.    Spontaneous intact placenta.  No lacerations.  QBL 25 ml.  Counts correct.  No complications.    Iris Jameson MD on 1/15/2022 at 7:42 AM      Saskia had a + COVID PCR on 22, taken per protocol before planned induction of labor.  She is asx. Isolation x 10 days would be through 22.  I did not order anticoagulation as she is asx, but will defer to OB attending.    Iris Jameson MD on 1/15/2022 at 7:49 AM

## 2022-01-15 NOTE — PLAN OF CARE
Data: Saskia Ayon transferred to [facility]  via wheelchair at 1130. Baby transferred via parent's arms.  Action: Receiving unit notified of transfer: Yes. Patient and family notified of room change. Report given to Pauline SMALL  at time of transfer. Belongings sent to receiving unit. Accompanied by Registered Nurse. Oriented patient to surroundings. Call light within reach. ID bands double-checked with receiving RN.  Response: Patient tolerated transfer and is stable.

## 2022-01-15 NOTE — PLAN OF CARE
"Pt oriented to room and stay, voiding well, denied pain medications, encouraged breast feeding every 2-3 hrs, \"\" per pt, will need BR pump for discharge, plan of care reviewed with pt.  "

## 2022-01-15 NOTE — H&P
No significant change in general health status based on examination of the patient, review of Nursing Admission Database and prenatal record.    Diagnosis of COVID 1/9, no symptoms currently and tested negative on home test recently.    Came in at complete dilation, delivered precipitously with the in house MD.  Currently in isolation. Continue through discharge.    Daylin Alarcon MD

## 2022-01-15 NOTE — PROVIDER NOTIFICATION
01/15/22 0720   Provider Notification   Provider Name/Title Dr. Alarcon   Method of Notification Phone   Request Attend Delivery     MD notified of patient arrival.  at 39&4 weeks who presented to L&D and was noted to be complete. In house MD is at the bedside. MD will come in to the hospital.

## 2022-01-15 NOTE — PROVIDER NOTIFICATION
Data: Patient presented to [facility]: 1/15/2022  7:16 AM.  Reason for maternal/fetal assessment is uterine contractions, leaking vaginal fluid. Patient reports feeling some contractions and rupture of membranes at 0545, clear fluid.  Patient is a .  Prenatal record reviewed. Pregnancy has been uncomplicated.. SVE was checked and patient's SVE is 10/100/+1. Patient arrived to the hospital from the ED, patient feels the urge to push. Monitors applied, baby is active.  is at the bedside.   Gestational Age 39w4d. VSS. Fetal movement active. Patient denies nausea, vomiting, headache, visual disturbances, epigastric or URQ pain, significant edema. Support person is present.   Action: Verbal consent for EFM. Triage assessment completed. Bill of rights reviewed.  Response: Patient verbalized agreement with plan. Will contact Dr Daylin Alarcon that patient is complete , Dr. Jameson is at bedside for delivery.

## 2022-01-15 NOTE — PROGRESS NOTES
Appreciate the care of the in house MD for this patient, who presented at complete dilation and delivered precipitously.  She is resting well. Prior COVID diagnosis on 1/9, so in isolation now. No symptoms.  She has no concerns or questions.  Given her asymptomatic status, will not plan anticoagulation at this time, but recommend SCDs while in bed. Otherwise, ambulate in the room.    Daylin Alarcon MD

## 2022-01-16 VITALS
HEART RATE: 80 BPM | SYSTOLIC BLOOD PRESSURE: 111 MMHG | DIASTOLIC BLOOD PRESSURE: 58 MMHG | TEMPERATURE: 98.2 F | RESPIRATION RATE: 16 BRPM

## 2022-01-16 PROCEDURE — 250N000013 HC RX MED GY IP 250 OP 250 PS 637: Performed by: OBSTETRICS & GYNECOLOGY

## 2022-01-16 PROCEDURE — 999N000080 HC STATISTIC IP LACTATION SERVICES 16-30 MIN

## 2022-01-16 RX ORDER — IBUPROFEN 200 MG
400 TABLET ORAL EVERY 4 HOURS PRN
COMMUNITY
Start: 2022-01-16 | End: 2023-08-31

## 2022-01-16 RX ORDER — ACETAMINOPHEN 325 MG/1
650 TABLET ORAL EVERY 4 HOURS PRN
Status: ON HOLD | COMMUNITY
Start: 2022-01-16 | End: 2023-12-16

## 2022-01-16 RX ADMIN — DOCUSATE SODIUM 100 MG: 100 CAPSULE ORAL at 08:21

## 2022-01-16 ASSESSMENT — ACTIVITIES OF DAILY LIVING (ADL)
ADLS_ACUITY_SCORE: 6

## 2022-01-16 NOTE — PLAN OF CARE
Data: Vital signs within normal limits. Denies Covid symptoms. Postpartum checks within normal limits - see flow record. Patient eating and drinking normally. Patient able to empty bladder independently and is up ambulating. No apparent signs of infection.  Patient performing self cares and is able to care for infant.  Action: Patient not medicated during the shift for pain and cramping. - patient stated she was comfortable. Patient education done about . See flow record.  Response: Positive attachment behaviors observed with infant. Support persons spouse  present.   Plan: Anticipate discharge later today if baby ok to discharge EPDS score 2. BC completed. Breast pump given.

## 2022-01-16 NOTE — PLAN OF CARE
Patient vitally stable. Declines covid symptoms. Voiding without issue. Tolerating regular diet. Ambulating steadily, independently. Fundal checks WDL. Lochia is scant, no clots. Declines need for tylenol and ibuprofen. Infant breastfeeding every 3 hours. Baby is sleepy at the breast. Patient found cosleeping x, baby returned to [facility], and parents reeducated on safe sleep techniques. Father of baby present and supportive. Both parents are attentive to infant.

## 2022-01-16 NOTE — DISCHARGE INSTRUCTIONS
Postpartum Vaginal Delivery Instructions  Follow up with Obgyn in 6 weeks for postpartum check-up  [facility]: [phone]    Activity       Ask family and friends for help when you need it.    Do not place anything in your vagina for 6 weeks.    You are not restricted on other activities, but take it easy for a few weeks to allow your body to recover from delivery.  You are able to do any activities you feel up to that point.    No driving until you have stopped taking your pain medications (usually two weeks after delivery).     Call your health care provider if you have any of these symptoms:       Increased pain, swelling, redness, or fluid around your stiches from an episiotomy or perineal tear.    A fever above 100.4 F (38 C) with or without chills when placing a thermometer under your tongue.    You soak a sanitary pad with blood within 1 hour, or you see blood clots larger than a golf ball.    Bleeding that lasts more than 6 weeks.    Vaginal discharge that smells bad.    Severe pain, cramping or tenderness in your lower belly area.    A need to urinate more frequently (use the toilet more often), more urgently (use the toilet very quickly), or it burns when you urinate.    Nausea and vomiting.    Redness, swelling or pain around a vein in your leg.    Problems breastfeeding or a red or painful area on your breast.    Chest pain and cough or are gasping for air.    Problems coping with sadness, anxiety, or depression.  If you have any concerns about hurting yourself or the baby, call your provider immediately.     You have questions or concerns after you return home.     Keep your hands clean:  Always wash your hands before touching your perineal area and stitches.  This helps reduce your risk of infection.  If your hands aren't dirty, you may use an alcohol hand-rub to clean your hands. Keep your nails clean and short.

## 2022-01-16 NOTE — PLAN OF CARE
Data: VSS. Postpartum checks within normal limits - see flow record. Patient eating and drinking normally. Patient able to empty bladder independently and is up ambulating. No apparent signs of infection. Patient performing self cares, is able to care for infant and is breastfeeding every 2-3 hours.   Action: Patient declined pain medication this shift. Patient education done, see flow record.  Response:Parents bonding well with baby. FOB at bedside, supportive.    Discharge instructions completed.  Patient states she understands all discharge instructions and all her questions have been answered.  Verbalizes when she needs to return to clinic for follow up for herself and baby. Patient will follow up with home care on Tuesday.  She is caring for herself and her baby independently.  Postpartum depression symptoms reviewed and encouraged frequent review of depression scale. Breast pump was given. Patient discharged with  and baby at 1730.

## 2022-01-16 NOTE — LACTATION NOTE
Lactation in to see patient. Second baby breastfeeding. History of good milk supply. Wanting a shield just in case. Did use shield for a short period with her first for right breast. Encouraged patient to call when ready to feed for assistance or observation. Educated on breast feeding every 2-3 hours to help bring in milk, and hydration for infant.  Medela pump given to patient for home.

## 2022-01-16 NOTE — PROGRESS NOTES
[facility] Obstetrics Post-Partum Progress Note          Assessment and Plan:    Assessment:   Post-partum day #1  Normal spontaneous vaginal delivery  L&D complications: Positive Covid 1/9/22  Rh pos RI      Doing well.  No immediate surgical complications identified.  No excessive bleeding  Pain well-controlled.      Plan:   Ambulation encouraged  Breast feeding strategies discussed  Monitor wound for signs of infection  Pain control measures as needed  Reportable signs and symptoms dicussed with the patient  Pt desires discharge later today   Covid precautions given  Take your tempature twice daily for 3 days and call if > 100.4  Nothing in vagina for 6 wks  Continue taking prenatal MVI daily for 1 month    Discharge later today           Interval History:   Doing well.  Pain is well-controlled.  No fevers.  No history of foul-smelling vaginal discharge.  Good appetite.  Denies chest pain, shortness of breath, nausea or vomiting.  Vaginal bleeding is similar to a heavy menstrual flow.  Ambulatory.  Breastfeeding well.          Significant Problems:      Past Medical History:   Diagnosis Date     Genital herpes      HSV-2 infection complicating pregnancy, third trimester 12/31/2021     IUD (intrauterine device) in place 01/05/2016    Removed 11/18/2020     No active medical problems      Varicella              Review of Systems:    The patient denies any chest pain, shortness of breath, excessive pain, fever, chills, purulent drainage from the wound, nausea or vomiting.          Medications:     All medications related to the patient's surgery have been reviewed  Current Facility-Administered Medications   Medication     acetaminophen (TYLENOL) tablet 650 mg     benzocaine (AMERICAINE) 20 % topical spray     bisacodyl (DULCOLAX) Suppository 10 mg     carboprost (HEMABATE) injection 250 mcg     docusate sodium (COLACE) capsule 100 mg     hydrocortisone 2.5 % cream     ibuprofen (ADVIL/MOTRIN) tablet  800 mg     lanolin cream     methylergonovine (METHERGINE) injection 200 mcg     misoprostol (CYTOTEC) tablet 400 mcg    Or     misoprostol (CYTOTEC) tablet 800 mcg     No MMR Needed - Assessment: Patient does not need MMR vaccine     No Tdap Needed - Assessment: Patient does not need Tdap vaccine     oxytocin (PITOCIN) 30 units in 500 mL 0.9% NaCl infusion     oxytocin (PITOCIN) injection 10 Units     tranexamic acid 1 g in 100 mL NS IV bag (premix)             Physical Exam:   Vitals were reviewed  All vitals stable  Temp: 98.2  F (36.8  C) Temp src: Oral BP: 122/55 Pulse: 81   Resp: 16        Uterine fundus is firm, non-tender and at the level of the umbilicus          Data:     All laboratory data related to this surgery reviewed  Hemoglobin   Date Value Ref Range Status   11/01/2021 9.9 (L) 11.7 - 15.7 g/dL Final   06/22/2021 12.1 11.7 - 15.7 g/dL Final   06/17/2019 14.0 11.7 - 15.7 g/dL Final   08/11/2015 10.8 (L) 11.7 - 15.7 g/dL Final   06/24/2015 11.9 11.7 - 15.7 g/dL Final   05/20/2015 12.5 11.7 - 15.7 g/dL Final     All imaging studies related to this surgery reviewed    Joe Prado MD

## 2022-05-15 ENCOUNTER — HEALTH MAINTENANCE LETTER (OUTPATIENT)
Age: 27
End: 2022-05-15

## 2022-05-23 ENCOUNTER — OFFICE VISIT (OUTPATIENT)
Dept: OBGYN | Facility: CLINIC | Age: 27
End: 2022-05-23

## 2022-05-23 VITALS — SYSTOLIC BLOOD PRESSURE: 92 MMHG | DIASTOLIC BLOOD PRESSURE: 58 MMHG | WEIGHT: 96 LBS | BODY MASS INDEX: 18.75 KG/M2

## 2022-05-23 DIAGNOSIS — Z32.00 ENCOUNTER FOR PREGNANCY TEST, RESULT UNKNOWN: ICD-10-CM

## 2022-05-23 DIAGNOSIS — N91.2 LACTATIONAL AMENORRHEA: ICD-10-CM

## 2022-05-23 DIAGNOSIS — Z11.3 SCREEN FOR STD (SEXUALLY TRANSMITTED DISEASE): ICD-10-CM

## 2022-05-23 PROBLEM — B00.9 HSV-2 INFECTION COMPLICATING PREGNANCY, THIRD TRIMESTER: Status: RESOLVED | Noted: 2021-12-31 | Resolved: 2022-05-23

## 2022-05-23 PROBLEM — O98.513 HSV-2 INFECTION COMPLICATING PREGNANCY, THIRD TRIMESTER: Status: RESOLVED | Noted: 2021-12-31 | Resolved: 2022-05-23

## 2022-05-23 PROBLEM — O98.513 COVID-19 AFFECTING PREGNANCY IN THIRD TRIMESTER: Status: RESOLVED | Noted: 2022-01-10 | Resolved: 2022-05-23

## 2022-05-23 PROBLEM — U07.1 COVID-19 AFFECTING PREGNANCY IN THIRD TRIMESTER: Status: RESOLVED | Noted: 2022-01-10 | Resolved: 2022-05-23

## 2022-05-23 PROCEDURE — 87591 N.GONORRHOEAE DNA AMP PROB: CPT | Performed by: OBSTETRICS & GYNECOLOGY

## 2022-05-23 PROCEDURE — 87491 CHLMYD TRACH DNA AMP PROBE: CPT | Performed by: OBSTETRICS & GYNECOLOGY

## 2022-05-23 PROCEDURE — 99213 OFFICE O/P EST LOW 20 MIN: CPT | Performed by: OBSTETRICS & GYNECOLOGY

## 2022-05-23 RX ORDER — MEDROXYPROGESTERONE ACETATE 10 MG
10 TABLET ORAL DAILY
Qty: 10 TABLET | Refills: 0 | Status: SHIPPED | OUTPATIENT
Start: 2022-05-23 | End: 2022-06-02

## 2022-05-23 NOTE — PROGRESS NOTES
SUBJECTIVE:  Saskia Ayon,  is here for a postpartum visit.  She had a  on 1/15/2022.      Last PHQ-9 score on record=   PHQ-9 SCORE 2015   PHQ-9 Total Score 2     No flowsheet data found.    Delivery complications:  Yes, Precipitous labor  Breast feeding:  Yes, supplementing w/ formula  Bladder problems:  No  Bowel problems/hemorrhoids:  No  Episiotomy/laceration/incision healed? N/A  Vaginal flow:  None  Kal[place]:  No  Contraception: IUD (Mirena) desired  Emotional adjustment:  doing well and happy  Back to work:  Yes    HPI: Pt forgot to schedule 6 week PP checkup in timely manner so she is here today. Pt has resumed menses but irregular due to lactation.  LMP 2022, had neg HPT 3 days ago.    12 point review of systems negative other than symptoms noted below.    OBJECTIVE:  Vitals: BP 92/58 (BP Location: Right arm, Patient Position: Sitting, Cuff Size: Adult Regular)   Wt 43.5 kg (96 lb)   LMP 2022 (Exact Date)   BMI 18.75 kg/m    BMI= Body mass index is 18.75 kg/m .  General - pleasant female in no acute distress.  Breast - no nodularity, asymmetry or nipple discharge bilaterally.  Abdomen - soft, nontender, nondistended, no hepatosplenomegaly.  Pelvic - EG: normal adult female, BUS: within normal limits, Vagina: well rugated, no discharge, Cervix: no lesions or CMT, Uterus: firm, normal sized and nontender, Adnexae: no masses or tenderness.  Rectovaginal - deferred.    UPT Neg    ASSESSMENT:    ICD-10-CM    1. Postpartum care following vaginal delivery  Z39.2    2. Screen for STD (sexually transmitted disease)  Z11.3    3. Encounter for pregnancy test, result unknown  Z32.00    4. Lactational amenorrhea  N91.2 medroxyPROGESTERone (PROVERA) 10 MG tablet       PLAN:  May resume normal activities without restrictions.  Pap smear performed:  No.  Not due until   Rx Provera 10 mg x 10 days to induce w/d bleed.  RTC during menses for Mirena insertion.  Full  counseling was provided, and all questions were answered.   Return to clinic in one year for an annual visit. Pap due at that time.    Houston Logan MD    There are no Patient Instructions on file for this visit.

## 2022-05-23 NOTE — NURSING NOTE
Chief Complaint   Patient presents with     IUD     Mirena        Initial BP 92/58 (BP Location: Right arm, Patient Position: Sitting, Cuff Size: Adult Regular)   Wt 43.5 kg (96 lb)   LMP 2022 (Exact Date)   BMI 18.75 kg/m   Estimated body mass index is 18.75 kg/m  as calculated from the following:    Height as of 21: 1.524 m (5').    Weight as of this encounter: 43.5 kg (96 lb).  BP completed using cuff size: regular    Questioned patient about current smoking habits.  Pt. has never smoked.          The following HM Due: NONE    Cornelio Combs, EZEQUIEL

## 2022-05-24 LAB
C TRACH DNA SPEC QL NAA+PROBE: NEGATIVE
N GONORRHOEA DNA SPEC QL NAA+PROBE: NEGATIVE

## 2022-09-11 ENCOUNTER — HEALTH MAINTENANCE LETTER (OUTPATIENT)
Age: 27
End: 2022-09-11

## 2023-06-03 ENCOUNTER — HEALTH MAINTENANCE LETTER (OUTPATIENT)
Age: 28
End: 2023-06-03

## 2023-08-31 ENCOUNTER — VIRTUAL VISIT (OUTPATIENT)
Dept: OBGYN | Facility: CLINIC | Age: 28
End: 2023-08-31

## 2023-08-31 DIAGNOSIS — Z34.90 SUPERVISION OF NORMAL PREGNANCY: Primary | ICD-10-CM

## 2023-08-31 PROCEDURE — 99207 PR NO CHARGE NURSE ONLY: CPT
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
## 2023-09-06 LAB
ABO/RH(D): NORMAL
ANTIBODY SCREEN: NEGATIVE
SPECIMEN EXPIRATION DATE: NORMAL

## 2023-09-07 ENCOUNTER — LAB (OUTPATIENT)
Dept: LAB | Facility: CLINIC | Age: 28
End: 2023-09-07

## 2023-09-07 ENCOUNTER — ANCILLARY PROCEDURE (OUTPATIENT)
Dept: ULTRASOUND IMAGING | Facility: CLINIC | Age: 28
End: 2023-09-07

## 2023-09-07 DIAGNOSIS — Z34.90 SUPERVISION OF NORMAL PREGNANCY: ICD-10-CM

## 2023-09-07 LAB
ERYTHROCYTE [DISTWIDTH] IN BLOOD BY AUTOMATED COUNT: 11.9 % (ref 10–15)
HCT VFR BLD AUTO: 39.8 % (ref 35–47)
HGB BLD-MCNC: 13.6 G/DL (ref 11.7–15.7)
MCH RBC QN AUTO: 29.9 PG (ref 26.5–33)
MCHC RBC AUTO-ENTMCNC: 34.2 G/DL (ref 31.5–36.5)
MCV RBC AUTO: 88 FL (ref 78–100)
PLATELET # BLD AUTO: 278 10E3/UL (ref 150–450)
RBC # BLD AUTO: 4.55 10E6/UL (ref 3.8–5.2)
WBC # BLD AUTO: 5.4 10E3/UL (ref 4–11)

## 2023-09-07 PROCEDURE — 87340 HEPATITIS B SURFACE AG IA: CPT

## 2023-09-07 PROCEDURE — 87086 URINE CULTURE/COLONY COUNT: CPT

## 2023-09-07 PROCEDURE — 86803 HEPATITIS C AB TEST: CPT

## 2023-09-07 PROCEDURE — 85027 COMPLETE CBC AUTOMATED: CPT

## 2023-09-07 PROCEDURE — 86900 BLOOD TYPING SEROLOGIC ABO: CPT

## 2023-09-07 PROCEDURE — 86780 TREPONEMA PALLIDUM: CPT

## 2023-09-07 PROCEDURE — 87389 HIV-1 AG W/HIV-1&-2 AB AG IA: CPT

## 2023-09-07 PROCEDURE — 86850 RBC ANTIBODY SCREEN: CPT

## 2023-09-07 PROCEDURE — 76817 TRANSVAGINAL US OBSTETRIC: CPT | Performed by: FAMILY MEDICINE

## 2023-09-07 PROCEDURE — 86901 BLOOD TYPING SEROLOGIC RH(D): CPT

## 2023-09-07 PROCEDURE — 76801 OB US < 14 WKS SINGLE FETUS: CPT | Performed by: FAMILY MEDICINE

## 2023-09-07 PROCEDURE — 86762 RUBELLA ANTIBODY: CPT

## 2023-09-07 PROCEDURE — 36415 COLL VENOUS BLD VENIPUNCTURE: CPT

## 2023-09-08 LAB
HBV SURFACE AG SERPL QL IA: NONREACTIVE
HCV AB SERPL QL IA: NONREACTIVE
HIV 1+2 AB+HIV1 P24 AG SERPL QL IA: NONREACTIVE
RUBV IGG SERPL QL IA: 1.86 INDEX
RUBV IGG SERPL QL IA: POSITIVE
T PALLIDUM AB SER QL: NONREACTIVE

## 2023-09-09 LAB — BACTERIA UR CULT: NORMAL

## 2023-09-21 ENCOUNTER — PRENATAL OFFICE VISIT (OUTPATIENT)
Dept: OBGYN | Facility: CLINIC | Age: 28
End: 2023-09-21

## 2023-09-21 VITALS — DIASTOLIC BLOOD PRESSURE: 60 MMHG | WEIGHT: 104 LBS | BODY MASS INDEX: 20.31 KG/M2 | SYSTOLIC BLOOD PRESSURE: 102 MMHG

## 2023-09-21 DIAGNOSIS — Z34.80 SUPERVISION OF OTHER NORMAL PREGNANCY, ANTEPARTUM: Primary | ICD-10-CM

## 2023-09-21 PROCEDURE — 99213 OFFICE O/P EST LOW 20 MIN: CPT | Performed by: OBSTETRICS & GYNECOLOGY

## 2023-09-21 PROCEDURE — 87591 N.GONORRHOEAE DNA AMP PROB: CPT | Performed by: OBSTETRICS & GYNECOLOGY

## 2023-09-21 PROCEDURE — 87491 CHLMYD TRACH DNA AMP PROBE: CPT | Performed by: OBSTETRICS & GYNECOLOGY

## 2023-09-21 NOTE — NURSING NOTE
Chief Complaint   Patient presents with    Prenatal Care     11 3/7       Initial /60   Wt 47.2 kg (104 lb)   LMP 2023 (Exact Date)   BMI 20.31 kg/m   Estimated body mass index is 20.31 kg/m  as calculated from the following:    Height as of 21: 1.524 m (5').    Weight as of this encounter: 47.2 kg (104 lb).  BP completed using cuff size: regular    Questioned patient about current smoking habits.  Pt. quit smoking some time ago.          The following HM Due: NONE      The following patient reported/Care Every where data was sent to:  P ABSTRACT QUALITY INITIATIVES [[id number]]        Brittany Dickinson, [facility]

## 2023-09-21 NOTE — PROGRESS NOTES
Saskia Ayon is a 28 year old  female  Estimated Date of Delivery: 24 based on 6 week U/S blood group A Rh pos RI   who presents to the clinic for an new ob visit.  The FOB of this pregnancy is the same as her 2nd pregnancy.   testing including the NIPS screen, the  1st trimester screen, the quad test, the AFP test, the modified sequential test, cystic fibrosis screening and Ultrasound and the time frames for each of these were reviewed.  The pt is declining these tests at this time.      She has not had bleeding since her LMP.   She has had mild nausea. Weigh loss has not occurred.   This was a planned pregnancy.   FOB is involved,  Demarcocameron Chapman  OTHER CONCERNS: none  INFECTION HISTORY  HIV: no  Hepatitis B: no  Hepatitis C: no  Syphilis:  no  Tuberculosis: no   PPD- no  Herpes self: yes  Herpes partner:  no  Chlamydia:  no  Gonorrhea:  no  HPV: no  BV:  no  Trichomonis:  no  Chicken Pox:  YES  ====================================================  PERSONAL/SOCIAL HISTORY  Lives lives with their spouse.  Employment: Part time.  Her job involves light activity .  Additional items: None  =====================================================   REVIEW OF SYSTEMS  CONSTITUTIONAL: NEGATIVE for fever, chills  EYES: NEGATIVE for vision changes   RESP: NEGATIVE for significant cough or SOB  BREAST: NEGATIVE for masses, tenderness or discharge  CV: NEGATIVE for chest pain, palpitations   GI: NEGATIVE for nausea, abdominal pain, heartburn, or change in bowel habits  : NEGATIVE for frequency, dysuria, or hematuria  MUSCULOSKELETAL: NEGATIVE for significant arthralgias or myalgia  NEURO: NEGATIVE for weakness, dizziness or paresthesias or headache  ====================================================  PHYSICAL EXAM:  /60   Wt 47.2 kg (104 lb)   LMP 2023 (Exact Date)   BMI 20.31 kg/m    BMI- Body mass index is 20.31 kg/m .,     RECOMMENDED WEIGHT GAIN: 25-35  lbs.  GENERAL:  Pleasant pregnant female, alert, well groomed.  SKIN:  Warm and dry, without lesions or rashes  HEAD: Symmetrical features.  EYES:  PERRLA,   MOUTH:  Buccal mucosa pink, moist without lesions.    NECK:  Thyroid without enlargement and nodules.  Lymph nodes not palpable.   LUNGS:  Clear to auscultation.  BREAST:  Symmetrical.  No dominant, fixed or suspicious masses are noted.  No skin or nipple changes or axillary nodes.  Self exam is taught and encouraged.  Nipples everted.      HEART:  RRR without murmur.  ABDOMEN: Soft without masses , tenderness or organomegaly.  No CVA tenderness. No scar  FHT not heard  MUSCULOSKELETAL:  Full range of motion  EXTREMITIES:  No edema. No significant varicosities.   GENITALIA:  BUS WNL, no lesions noted   VAGINA:  Pink, normal rugae and discharge normal and physiologic, GC Chlamydia done  CERVIX:  smooth, without discharge or CMT and parous os,   firm/ closed 2 cm long.  UTERUS: Midposition, nontender 8 weeks in size.  ADNEXA: Without masses or tenderness  PELVIS:  Arch; wide . Sacrum; deep. Spines;blunt.  Side walls; straight. Type; gynecoid  PELVIS:   Adequate, Pelvis proven to 7 pounds 2 ounces.  RECTAL:  Normal appearance.  Digital exam deferred.  WET PREP:Not done  gonorrhea  =========================================  ASSESSMENT:  (Z34.80) Supervision of other normal pregnancy, antepartum  (primary encounter diagnosis)  Comment: will get U/S to confirm viabilty  FHT's not heard most likely related to early gestation  Plan: US Pelvic Complete with Transvaginal, NEISSERIA        GONORRHOEA PCR, CHLAMYDIA TRACHOMATIS PCR        done    PREGNANCY AT RISK? no  ==========================================  PLAN:  Instructed on use of triage nurse line and contacting the on call MD after hours for an urgent need such as fever, vagina bleeding, bladder or vaginal infection, rupture of membranes,  or term labor.    Discussed the indications, uses for and false  positives for quad screen, nuchal translucency and fetal survey ultrasound at 18-20 weeks gestation. The pt declines  these screens.  Instructed on best evidence for: weight gain for her BMI for pregnancy; healthy diet and foods to avoid; exercise and activity during pregnancy;avoiding exposure to toxoplasmosis; and maintenance of a generally healthy lifestyle.   Discussed the harms, benefits, side effects and alternative therapies for current prescribed and OTC medications.  Written plan given  sx of concern discussed pt to call  RTC 4 weeks or prn concern

## 2023-09-21 NOTE — PATIENT INSTRUCTIONS
You can reach your [facility] Care Team any time of the day by calling [phone]. This number will put you in touch with the 24 hour nurse line if the clinic is closed.    To contact your OB/GYN Station Coordinator/Surgery Scheduler please call [phone]. This is a direct number for your care team between 8 a.m. and 4 p.m. Monday through Friday.    [facility] is open for your convenience:  Monday through Friday 8 a.m. to 6 p.m.  Closed weekends and all major holidays.

## 2023-09-22 ENCOUNTER — ANCILLARY PROCEDURE (OUTPATIENT)
Dept: ULTRASOUND IMAGING | Facility: CLINIC | Age: 28
End: 2023-09-22
Attending: OBSTETRICS & GYNECOLOGY

## 2023-09-22 DIAGNOSIS — Z34.80 SUPERVISION OF OTHER NORMAL PREGNANCY, ANTEPARTUM: ICD-10-CM

## 2023-09-22 LAB
C TRACH DNA SPEC QL NAA+PROBE: NEGATIVE
N GONORRHOEA DNA SPEC QL NAA+PROBE: NEGATIVE

## 2023-09-22 PROCEDURE — 76801 OB US < 14 WKS SINGLE FETUS: CPT | Performed by: OBSTETRICS & GYNECOLOGY

## 2023-09-27 ENCOUNTER — TELEPHONE (OUTPATIENT)
Dept: OBGYN | Facility: CLINIC | Age: 28
End: 2023-09-27

## 2023-09-27 DIAGNOSIS — Z86.19 HX OF HERPES GENITALIS: ICD-10-CM

## 2023-09-27 RX ORDER — ACYCLOVIR 400 MG/1
400 TABLET ORAL 3 TIMES DAILY
Qty: 60 TABLET | Refills: 2 | Status: ON HOLD | OUTPATIENT
Start: 2023-09-27 | End: 2024-03-26

## 2023-09-27 NOTE — PROGRESS NOTES
Med discontinued at appt and pt needs a refill. Refill sent to pt's preferred pharmacy.    GEOVANNY Dillard

## 2023-09-27 NOTE — TELEPHONE ENCOUNTER
Zaltrex med got discontinued at last appt and pt calling bc she needs a refill. Refill sent.    GEOVANNY Dillard

## 2023-11-13 ENCOUNTER — PRENATAL OFFICE VISIT (OUTPATIENT)
Dept: OBGYN | Facility: CLINIC | Age: 28
End: 2023-11-13

## 2023-11-13 VITALS
DIASTOLIC BLOOD PRESSURE: 60 MMHG | BODY MASS INDEX: 21.01 KG/M2 | HEIGHT: 60 IN | WEIGHT: 107 LBS | SYSTOLIC BLOOD PRESSURE: 104 MMHG

## 2023-11-13 DIAGNOSIS — Z34.92 NORMAL PREGNANCY, SECOND TRIMESTER: ICD-10-CM

## 2023-11-13 DIAGNOSIS — Z34.80 SUPERVISION OF OTHER NORMAL PREGNANCY, ANTEPARTUM: Primary | ICD-10-CM

## 2023-11-13 PROCEDURE — 99213 OFFICE O/P EST LOW 20 MIN: CPT | Performed by: OBSTETRICS & GYNECOLOGY

## 2023-11-13 RX ORDER — ONDANSETRON 4 MG/1
4 TABLET, ORALLY DISINTEGRATING ORAL EVERY 8 HOURS PRN
Qty: 15 TABLET | Refills: 1 | Status: SHIPPED | OUTPATIENT
Start: 2023-11-13 | End: 2024-03-15

## 2023-11-13 NOTE — NURSING NOTE
15w6d    Chief Complaint   Patient presents with    Prenatal Care     Pain has gone away--having morning sickness and vomiting       Initial /60   Ht 1.524 m (5')   Wt 48.5 kg (107 lb)   LMP 2023 (Approximate)   BMI 20.90 kg/m   Estimated body mass index is 20.9 kg/m  as calculated from the following:    Height as of this encounter: 1.524 m (5').    Weight as of this encounter: 48.5 kg (107 lb).  BP completed using cuff size: regular    Questioned patient about current smoking habits.  Pt. quit smoking some time ago.          The following HM Due: NONE

## 2023-11-13 NOTE — PROGRESS NOTES
Doing well but still with daily emesis and nausea,  So far she has been trying to eat small meals frequently.  Discussed various over the counter and dietary suggestions.  Will Rx with Zofran.  20 week US ordered  RTC one month or PRN

## 2023-11-15 ENCOUNTER — APPOINTMENT (OUTPATIENT)
Dept: ULTRASOUND IMAGING | Facility: CLINIC | Age: 28
End: 2023-11-15
Attending: EMERGENCY MEDICINE

## 2023-11-15 ENCOUNTER — HOSPITAL ENCOUNTER (EMERGENCY)
Facility: CLINIC | Age: 28
Discharge: HOME | End: 2023-11-15
Attending: EMERGENCY MEDICINE | Admitting: EMERGENCY MEDICINE

## 2023-11-15 VITALS
TEMPERATURE: 97.3 F | HEART RATE: 66 BPM | DIASTOLIC BLOOD PRESSURE: 60 MMHG | WEIGHT: 104.28 LBS | OXYGEN SATURATION: 100 % | RESPIRATION RATE: 16 BRPM | SYSTOLIC BLOOD PRESSURE: 102 MMHG | BODY MASS INDEX: 20.37 KG/M2
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
## 2023-11-15 DIAGNOSIS — S00.93XA CONTUSION OF HEAD, UNSPECIFIED PART OF HEAD, INITIAL ENCOUNTER: ICD-10-CM

## 2023-11-15 DIAGNOSIS — Z33.1 PREGNANCY, INCIDENTAL: ICD-10-CM

## 2023-11-15 DIAGNOSIS — R10.9 ABDOMINAL CRAMPING: ICD-10-CM

## 2023-11-15 LAB
ALBUMIN UR-MCNC: 30 MG/DL
AMORPH CRY #/AREA URNS HPF: ABNORMAL /HPF
APPEARANCE UR: ABNORMAL
BILIRUB UR QL STRIP: NEGATIVE
COLOR UR AUTO: YELLOW
GLUCOSE UR STRIP-MCNC: NEGATIVE MG/DL
HGB UR QL STRIP: NEGATIVE
KETONES UR STRIP-MCNC: NEGATIVE MG/DL
LEUKOCYTE ESTERASE UR QL STRIP: ABNORMAL
MUCOUS THREADS #/AREA URNS LPF: PRESENT /LPF
NITRATE UR QL: NEGATIVE
PH UR STRIP: 7 [PH] (ref 5–7)
RBC URINE: 2 /HPF
SP GR UR STRIP: 1.02 (ref 1–1.03)
SQUAMOUS EPITHELIAL: 21 /HPF
UROBILINOGEN UR STRIP-MCNC: NORMAL MG/DL
WBC URINE: 19 /HPF

## 2023-11-15 PROCEDURE — 76805 OB US >/= 14 WKS SNGL FETUS: CPT

## 2023-11-15 PROCEDURE — 250N000013 HC RX MED GY IP 250 OP 250 PS 637: Performed by: EMERGENCY MEDICINE

## 2023-11-15 PROCEDURE — 99284 EMERGENCY DEPT VISIT MOD MDM: CPT | Mod: 25

## 2023-11-15 PROCEDURE — 81001 URINALYSIS AUTO W/SCOPE: CPT | Performed by: EMERGENCY MEDICINE

## 2023-11-15 RX ORDER — ACETAMINOPHEN 500 MG
500 TABLET ORAL EVERY 4 HOURS PRN
Status: DISCONTINUED | OUTPATIENT
Start: 2023-11-15 | End: 2023-11-15 | Stop reason: HOSPADM

## 2023-11-15 RX ADMIN — ACETAMINOPHEN 500 MG: 500 TABLET, FILM COATED ORAL at 13:02

## 2023-11-15 ASSESSMENT — ACTIVITIES OF DAILY LIVING (ADL): ADLS_ACUITY_SCORE: 35
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
## 2023-11-16 ENCOUNTER — PRENATAL OFFICE VISIT (OUTPATIENT)
Dept: OBGYN | Facility: CLINIC | Age: 28
End: 2023-11-16

## 2023-11-16 ENCOUNTER — TELEPHONE (OUTPATIENT)
Dept: BEHAVIORAL HEALTH | Facility: CLINIC | Age: 28
End: 2023-11-16

## 2023-11-16 ENCOUNTER — APPOINTMENT (OUTPATIENT)
Dept: ADMINISTRATIVE | Facility: CLINIC | Age: 28
End: 2023-11-16

## 2023-11-16 VITALS — DIASTOLIC BLOOD PRESSURE: 48 MMHG | SYSTOLIC BLOOD PRESSURE: 112 MMHG | WEIGHT: 105 LBS | BODY MASS INDEX: 20.51 KG/M2

## 2023-11-16 DIAGNOSIS — Z34.80 SUPERVISION OF OTHER NORMAL PREGNANCY, ANTEPARTUM: ICD-10-CM

## 2023-11-16 DIAGNOSIS — T74.91XA DOMESTIC VIOLENCE OF ADULT, INITIAL ENCOUNTER: Primary | ICD-10-CM

## 2023-11-16 PROCEDURE — 99213 OFFICE O/P EST LOW 20 MIN: CPT | Performed by: OBSTETRICS & GYNECOLOGY
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
## 2023-11-16 NOTE — TELEPHONE ENCOUNTER
Outreach made to pt to offer [facility] appt per the request of Krissy SMALL. Left voicemail with number for scheduling. Kinetat message also sent.      VIRGEN Harkins, [facility]  Behavioral Health Clinician  [facility]

## 2023-11-17 ENCOUNTER — APPOINTMENT (OUTPATIENT)
Dept: ADMINISTRATIVE | Facility: CLINIC | Age: 28
End: 2023-11-17

## 2023-12-11 ENCOUNTER — PRENATAL OFFICE VISIT (OUTPATIENT)
Dept: OBGYN | Facility: CLINIC | Age: 28
End: 2023-12-11
Payer: MEDICAID

## 2023-12-11 VITALS — DIASTOLIC BLOOD PRESSURE: 58 MMHG | BODY MASS INDEX: 21.68 KG/M2 | WEIGHT: 111 LBS | SYSTOLIC BLOOD PRESSURE: 112 MMHG

## 2023-12-11 DIAGNOSIS — N89.8 VAGINAL DISCHARGE: ICD-10-CM

## 2023-12-11 DIAGNOSIS — Z34.80 SUPERVISION OF OTHER NORMAL PREGNANCY, ANTEPARTUM: Primary | ICD-10-CM

## 2023-12-11 LAB
ALBUMIN UR-MCNC: ABNORMAL MG/DL
AMORPH CRY #/AREA URNS HPF: ABNORMAL /HPF
APPEARANCE UR: ABNORMAL
BACTERIA #/AREA URNS HPF: ABNORMAL /HPF
BILIRUB UR QL STRIP: NEGATIVE
CLUE CELLS: PRESENT
COLOR UR AUTO: YELLOW
GLUCOSE UR STRIP-MCNC: NEGATIVE MG/DL
HGB UR QL STRIP: ABNORMAL
KETONES UR STRIP-MCNC: NEGATIVE MG/DL
LEUKOCYTE ESTERASE UR QL STRIP: NEGATIVE
MUCOUS THREADS #/AREA URNS LPF: PRESENT /LPF
NITRATE UR QL: NEGATIVE
PH UR STRIP: 7 [PH] (ref 5–7)
RBC #/AREA URNS AUTO: ABNORMAL /HPF
SP GR UR STRIP: 1.02 (ref 1–1.03)
SQUAMOUS #/AREA URNS AUTO: ABNORMAL /LPF
T PALLIDUM AB SER QL: NONREACTIVE
TRICHOMONAS, WET PREP: ABNORMAL
UROBILINOGEN UR STRIP-ACNC: 1 E.U./DL
WBC #/AREA URNS AUTO: ABNORMAL /HPF
WBC'S/HIGH POWER FIELD, WET PREP: ABNORMAL
YEAST, WET PREP: ABNORMAL

## 2023-12-11 PROCEDURE — 87389 HIV-1 AG W/HIV-1&-2 AB AG IA: CPT | Performed by: OBSTETRICS & GYNECOLOGY

## 2023-12-11 PROCEDURE — 86780 TREPONEMA PALLIDUM: CPT | Performed by: OBSTETRICS & GYNECOLOGY

## 2023-12-11 PROCEDURE — 87491 CHLMYD TRACH DNA AMP PROBE: CPT | Performed by: OBSTETRICS & GYNECOLOGY

## 2023-12-11 PROCEDURE — 81001 URINALYSIS AUTO W/SCOPE: CPT | Performed by: OBSTETRICS & GYNECOLOGY

## 2023-12-11 PROCEDURE — 99207 PR PRENATAL VISIT: CPT | Performed by: OBSTETRICS & GYNECOLOGY

## 2023-12-11 PROCEDURE — 87591 N.GONORRHOEAE DNA AMP PROB: CPT | Performed by: OBSTETRICS & GYNECOLOGY

## 2023-12-11 PROCEDURE — 86803 HEPATITIS C AB TEST: CPT | Performed by: OBSTETRICS & GYNECOLOGY

## 2023-12-11 PROCEDURE — 36415 COLL VENOUS BLD VENIPUNCTURE: CPT | Performed by: OBSTETRICS & GYNECOLOGY

## 2023-12-11 PROCEDURE — 87210 SMEAR WET MOUNT SALINE/INK: CPT | Performed by: OBSTETRICS & GYNECOLOGY

## 2023-12-11 PROCEDURE — 87340 HEPATITIS B SURFACE AG IA: CPT | Performed by: OBSTETRICS & GYNECOLOGY
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
## 2023-12-12 LAB
C TRACH DNA SPEC QL NAA+PROBE: NEGATIVE
HBV SURFACE AG SERPL QL IA: NONREACTIVE
HCV AB SERPL QL IA: NONREACTIVE
HIV 1+2 AB+HIV1 P24 AG SERPL QL IA: NONREACTIVE
N GONORRHOEA DNA SPEC QL NAA+PROBE: NEGATIVE

## 2023-12-14 RX ORDER — CLINDAMYCIN PHOSPHATE 20 MG/G
1 CREAM VAGINAL AT BEDTIME
Qty: 40 G | Refills: 0 | Status: ON HOLD | OUTPATIENT
Start: 2023-12-14 | End: 2024-03-26

## 2023-12-15 ENCOUNTER — APPOINTMENT (OUTPATIENT)
Dept: ULTRASOUND IMAGING | Facility: CLINIC | Age: 28
End: 2023-12-15
Attending: OBSTETRICS & GYNECOLOGY

## 2023-12-15 ENCOUNTER — ANESTHESIA EVENT (OUTPATIENT)
Dept: SURGERY | Facility: CLINIC | Age: 28
End: 2023-12-15

## 2023-12-15 ENCOUNTER — APPOINTMENT (OUTPATIENT)
Dept: MRI IMAGING | Facility: CLINIC | Age: 28
End: 2023-12-15
Attending: OBSTETRICS & GYNECOLOGY

## 2023-12-15 ENCOUNTER — HOSPITAL ENCOUNTER (OUTPATIENT)
Facility: CLINIC | Age: 28
Discharge: HOME | End: 2023-12-16
Attending: OBSTETRICS & GYNECOLOGY | Admitting: OBSTETRICS & GYNECOLOGY

## 2023-12-15 ENCOUNTER — HOSPITAL ENCOUNTER (OUTPATIENT)
Facility: CLINIC | Age: 28
End: 2023-12-15
Admitting: OBSTETRICS & GYNECOLOGY

## 2023-12-15 ENCOUNTER — TELEPHONE (OUTPATIENT)
Dept: NURSING | Facility: CLINIC | Age: 28
End: 2023-12-15
Payer: MEDICAID

## 2023-12-15 ENCOUNTER — APPOINTMENT (OUTPATIENT)
Dept: ULTRASOUND IMAGING | Facility: CLINIC | Age: 28
End: 2023-12-15
Attending: SURGERY

## 2023-12-15 ENCOUNTER — ANESTHESIA (OUTPATIENT)
Dept: SURGERY | Facility: CLINIC | Age: 28
End: 2023-12-15

## 2023-12-15 DIAGNOSIS — Z90.49 S/P LAPAROSCOPIC APPENDECTOMY: ICD-10-CM

## 2023-12-15 DIAGNOSIS — U07.1 COVID-19 AFFECTING PREGNANCY IN THIRD TRIMESTER: ICD-10-CM

## 2023-12-15 DIAGNOSIS — O98.513 COVID-19 AFFECTING PREGNANCY IN THIRD TRIMESTER: ICD-10-CM

## 2023-12-15 DIAGNOSIS — Z36.89 ENCOUNTER FOR TRIAGE IN PREGNANT PATIENT: Primary | ICD-10-CM

## 2023-12-15 DIAGNOSIS — K35.30 ACUTE APPENDICITIS WITH LOCALIZED PERITONITIS, UNSPECIFIED WHETHER ABSCESS PRESENT, UNSPECIFIED WHETHER GANGRENE PRESENT, UNSPECIFIED WHETHER PERFORATION PRESENT: ICD-10-CM

## 2023-12-15 LAB
ABO/RH(D): NORMAL
ALBUMIN SERPL BCG-MCNC: 4.1 G/DL (ref 3.5–5.2)
ALBUMIN UR-MCNC: 30 MG/DL
ALP SERPL-CCNC: 71 U/L (ref 40–150)
ALT SERPL W P-5'-P-CCNC: 9 U/L (ref 0–50)
AMORPH CRY #/AREA URNS HPF: ABNORMAL /HPF
ANION GAP SERPL CALCULATED.3IONS-SCNC: 12 MMOL/L (ref 7–15)
ANTIBODY SCREEN: NEGATIVE
APPEARANCE UR: ABNORMAL
AST SERPL W P-5'-P-CCNC: 19 U/L (ref 0–45)
BASOPHILS # BLD AUTO: 0 10E3/UL (ref 0–0.2)
BASOPHILS NFR BLD AUTO: 0 %
BILIRUB SERPL-MCNC: 0.2 MG/DL
BILIRUB UR QL STRIP: NEGATIVE
BUN SERPL-MCNC: 6.8 MG/DL (ref 6–20)
CALCIUM SERPL-MCNC: 8.7 MG/DL (ref 8.6–10)
CHLORIDE SERPL-SCNC: 102 MMOL/L (ref 98–107)
COLOR UR AUTO: YELLOW
CREAT SERPL-MCNC: 0.37 MG/DL (ref 0.51–0.95)
DEPRECATED HCO3 PLAS-SCNC: 21 MMOL/L (ref 22–29)
EGFRCR SERPLBLD CKD-EPI 2021: >90 ML/MIN/1.73M2
EOSINOPHIL # BLD AUTO: 0 10E3/UL (ref 0–0.7)
EOSINOPHIL NFR BLD AUTO: 0 %
ERYTHROCYTE [DISTWIDTH] IN BLOOD BY AUTOMATED COUNT: 12.4 % (ref 10–15)
GLUCOSE SERPL-MCNC: 110 MG/DL (ref 70–99)
GLUCOSE UR STRIP-MCNC: 30 MG/DL
HCT VFR BLD AUTO: 33.9 % (ref 35–47)
HGB BLD-MCNC: 11.6 G/DL (ref 11.7–15.7)
HGB UR QL STRIP: NEGATIVE
IMM GRANULOCYTES # BLD: 0 10E3/UL
IMM GRANULOCYTES NFR BLD: 0 %
KETONES UR STRIP-MCNC: NEGATIVE MG/DL
LEUKOCYTE ESTERASE UR QL STRIP: ABNORMAL
LYMPHOCYTES # BLD AUTO: 0.9 10E3/UL (ref 0.8–5.3)
LYMPHOCYTES NFR BLD AUTO: 11 %
MCH RBC QN AUTO: 30.2 PG (ref 26.5–33)
MCHC RBC AUTO-ENTMCNC: 34.2 G/DL (ref 31.5–36.5)
MCV RBC AUTO: 88 FL (ref 78–100)
MONOCYTES # BLD AUTO: 0.4 10E3/UL (ref 0–1.3)
MONOCYTES NFR BLD AUTO: 5 %
MUCOUS THREADS #/AREA URNS LPF: PRESENT /LPF
NEUTROPHILS # BLD AUTO: 6.6 10E3/UL (ref 1.6–8.3)
NEUTROPHILS NFR BLD AUTO: 84 %
NITRATE UR QL: NEGATIVE
NRBC # BLD AUTO: 0 10E3/UL
NRBC BLD AUTO-RTO: 0 /100
PH UR STRIP: 6.5 [PH] (ref 5–7)
PLATELET # BLD AUTO: 262 10E3/UL (ref 150–450)
POTASSIUM SERPL-SCNC: 3.5 MMOL/L (ref 3.4–5.3)
PROT SERPL-MCNC: 7.6 G/DL (ref 6.4–8.3)
RBC # BLD AUTO: 3.84 10E6/UL (ref 3.8–5.2)
RBC URINE: 2 /HPF
SODIUM SERPL-SCNC: 135 MMOL/L (ref 135–145)
SP GR UR STRIP: 1.02 (ref 1–1.03)
SPECIMEN EXPIRATION DATE: NORMAL
SQUAMOUS EPITHELIAL: 6 /HPF
UROBILINOGEN UR STRIP-MCNC: NORMAL MG/DL
WBC # BLD AUTO: 7.9 10E3/UL (ref 4–11)
WBC URINE: 7 /HPF

## 2023-12-15 PROCEDURE — 250N000011 HC RX IP 250 OP 636: Mod: JZ | Performed by: ANESTHESIOLOGY

## 2023-12-15 PROCEDURE — 250N000025 HC SEVOFLURANE, PER MIN: Performed by: SURGERY

## 2023-12-15 PROCEDURE — 250N000011 HC RX IP 250 OP 636: Performed by: OBSTETRICS & GYNECOLOGY

## 2023-12-15 PROCEDURE — 272N000001 HC OR GENERAL SUPPLY STERILE: Performed by: SURGERY

## 2023-12-15 PROCEDURE — 88304 TISSUE EXAM BY PATHOLOGIST: CPT | Mod: 26

## 2023-12-15 PROCEDURE — 86850 RBC ANTIBODY SCREEN: CPT | Performed by: OBSTETRICS & GYNECOLOGY

## 2023-12-15 PROCEDURE — G0463 HOSPITAL OUTPT CLINIC VISIT: HCPCS | Mod: 25

## 2023-12-15 PROCEDURE — 76700 US EXAM ABDOM COMPLETE: CPT

## 2023-12-15 PROCEDURE — 99204 OFFICE O/P NEW MOD 45 MIN: CPT | Mod: 57 | Performed by: SURGERY

## 2023-12-15 PROCEDURE — 96361 HYDRATE IV INFUSION ADD-ON: CPT

## 2023-12-15 PROCEDURE — 250N000013 HC RX MED GY IP 250 OP 250 PS 637: Performed by: OBSTETRICS & GYNECOLOGY

## 2023-12-15 PROCEDURE — 250N000011 HC RX IP 250 OP 636: Performed by: ANESTHESIOLOGY

## 2023-12-15 PROCEDURE — 74181 MRI ABDOMEN W/O CONTRAST: CPT

## 2023-12-15 PROCEDURE — 999N000141 HC STATISTIC PRE-PROCEDURE NURSING ASSESSMENT: Performed by: SURGERY

## 2023-12-15 PROCEDURE — 44970 LAPAROSCOPY APPENDECTOMY: CPT | Performed by: SURGERY

## 2023-12-15 PROCEDURE — 250N000011 HC RX IP 250 OP 636: Performed by: SURGERY

## 2023-12-15 PROCEDURE — 250N000009 HC RX 250: Performed by: ANESTHESIOLOGY

## 2023-12-15 PROCEDURE — 80053 COMPREHEN METABOLIC PANEL: CPT | Performed by: OBSTETRICS & GYNECOLOGY

## 2023-12-15 PROCEDURE — 96360 HYDRATION IV INFUSION INIT: CPT

## 2023-12-15 PROCEDURE — 76817 TRANSVAGINAL US OBSTETRIC: CPT

## 2023-12-15 PROCEDURE — 86901 BLOOD TYPING SEROLOGIC RH(D): CPT | Performed by: OBSTETRICS & GYNECOLOGY

## 2023-12-15 PROCEDURE — 81001 URINALYSIS AUTO W/SCOPE: CPT | Performed by: OBSTETRICS & GYNECOLOGY

## 2023-12-15 PROCEDURE — 258N000003 HC RX IP 258 OP 636: Performed by: OBSTETRICS & GYNECOLOGY

## 2023-12-15 PROCEDURE — 99214 OFFICE O/P EST MOD 30 MIN: CPT | Performed by: OBSTETRICS & GYNECOLOGY

## 2023-12-15 PROCEDURE — 710N000009 HC RECOVERY PHASE 1, LEVEL 1, PER MIN: Performed by: SURGERY

## 2023-12-15 PROCEDURE — 96374 THER/PROPH/DIAG INJ IV PUSH: CPT

## 2023-12-15 PROCEDURE — 258N000001 HC RX 258: Performed by: SURGERY

## 2023-12-15 PROCEDURE — 96375 TX/PRO/DX INJ NEW DRUG ADDON: CPT

## 2023-12-15 PROCEDURE — 88304 TISSUE EXAM BY PATHOLOGIST: CPT | Mod: TC | Performed by: SURGERY

## 2023-12-15 PROCEDURE — 370N000017 HC ANESTHESIA TECHNICAL FEE, PER MIN: Performed by: SURGERY

## 2023-12-15 PROCEDURE — 96376 TX/PRO/DX INJ SAME DRUG ADON: CPT

## 2023-12-15 PROCEDURE — 258N000003 HC RX IP 258 OP 636: Performed by: ANESTHESIOLOGY

## 2023-12-15 PROCEDURE — 360N000076 HC SURGERY LEVEL 3, PER MIN: Performed by: SURGERY

## 2023-12-15 PROCEDURE — 85025 COMPLETE CBC W/AUTO DIFF WBC: CPT | Performed by: OBSTETRICS & GYNECOLOGY

## 2023-12-15 RX ORDER — ONDANSETRON 2 MG/ML
4 INJECTION INTRAMUSCULAR; INTRAVENOUS EVERY 6 HOURS PRN
Status: DISCONTINUED | OUTPATIENT
Start: 2023-12-15 | End: 2023-12-16 | Stop reason: HOSPADM

## 2023-12-15 RX ORDER — DEXAMETHASONE SODIUM PHOSPHATE 4 MG/ML
INJECTION, SOLUTION INTRA-ARTICULAR; INTRALESIONAL; INTRAMUSCULAR; INTRAVENOUS; SOFT TISSUE PRN
Status: DISCONTINUED | OUTPATIENT
Start: 2023-12-15 | End: 2023-12-15

## 2023-12-15 RX ORDER — HYDROMORPHONE HCL IN WATER/PF 6 MG/30 ML
0.4 PATIENT CONTROLLED ANALGESIA SYRINGE INTRAVENOUS EVERY 5 MIN PRN
Status: DISCONTINUED | OUTPATIENT
Start: 2023-12-15 | End: 2023-12-15

## 2023-12-15 RX ORDER — CEFAZOLIN SODIUM/WATER 2 G/20 ML
2 SYRINGE (ML) INTRAVENOUS
Status: DISCONTINUED | OUTPATIENT
Start: 2023-12-15 | End: 2023-12-15 | Stop reason: HOSPADM

## 2023-12-15 RX ORDER — FENTANYL CITRATE 50 UG/ML
25 INJECTION, SOLUTION INTRAMUSCULAR; INTRAVENOUS EVERY 5 MIN PRN
Status: DISCONTINUED | OUTPATIENT
Start: 2023-12-15 | End: 2023-12-15 | Stop reason: HOSPADM

## 2023-12-15 RX ORDER — FENTANYL CITRATE 50 UG/ML
50 INJECTION, SOLUTION INTRAMUSCULAR; INTRAVENOUS EVERY 5 MIN PRN
Status: DISCONTINUED | OUTPATIENT
Start: 2023-12-15 | End: 2023-12-15

## 2023-12-15 RX ORDER — ONDANSETRON 4 MG/1
4 TABLET, ORALLY DISINTEGRATING ORAL EVERY 30 MIN PRN
Status: DISCONTINUED | OUTPATIENT
Start: 2023-12-15 | End: 2023-12-15

## 2023-12-15 RX ORDER — ONDANSETRON 4 MG/1
4 TABLET, ORALLY DISINTEGRATING ORAL EVERY 30 MIN PRN
Status: DISCONTINUED | OUTPATIENT
Start: 2023-12-15 | End: 2023-12-15 | Stop reason: HOSPADM

## 2023-12-15 RX ORDER — ONDANSETRON 2 MG/ML
4 INJECTION INTRAMUSCULAR; INTRAVENOUS EVERY 30 MIN PRN
Status: DISCONTINUED | OUTPATIENT
Start: 2023-12-15 | End: 2023-12-15 | Stop reason: HOSPADM

## 2023-12-15 RX ORDER — HYDROMORPHONE HCL IN WATER/PF 6 MG/30 ML
0.4 PATIENT CONTROLLED ANALGESIA SYRINGE INTRAVENOUS EVERY 5 MIN PRN
Status: DISCONTINUED | OUTPATIENT
Start: 2023-12-15 | End: 2023-12-15 | Stop reason: HOSPADM

## 2023-12-15 RX ORDER — FENTANYL CITRATE 50 UG/ML
25 INJECTION, SOLUTION INTRAMUSCULAR; INTRAVENOUS EVERY 5 MIN PRN
Status: DISCONTINUED | OUTPATIENT
Start: 2023-12-15 | End: 2023-12-15

## 2023-12-15 RX ORDER — HYDROMORPHONE HYDROCHLORIDE 1 MG/ML
0.5 INJECTION, SOLUTION INTRAMUSCULAR; INTRAVENOUS; SUBCUTANEOUS
Status: DISCONTINUED | OUTPATIENT
Start: 2023-12-15 | End: 2023-12-15

## 2023-12-15 RX ORDER — CEFAZOLIN SODIUM 1 G/3ML
INJECTION, POWDER, FOR SOLUTION INTRAMUSCULAR; INTRAVENOUS PRN
Status: DISCONTINUED | OUTPATIENT
Start: 2023-12-15 | End: 2023-12-15

## 2023-12-15 RX ORDER — NALOXONE HYDROCHLORIDE 0.4 MG/ML
0.2 INJECTION, SOLUTION INTRAMUSCULAR; INTRAVENOUS; SUBCUTANEOUS
Status: DISCONTINUED | OUTPATIENT
Start: 2023-12-15 | End: 2023-12-16 | Stop reason: HOSPADM

## 2023-12-15 RX ORDER — LIDOCAINE 40 MG/G
CREAM TOPICAL
Status: DISCONTINUED | OUTPATIENT
Start: 2023-12-15 | End: 2023-12-15 | Stop reason: HOSPADM

## 2023-12-15 RX ORDER — PROPOFOL 10 MG/ML
INJECTION, EMULSION INTRAVENOUS PRN
Status: DISCONTINUED | OUTPATIENT
Start: 2023-12-15 | End: 2023-12-15

## 2023-12-15 RX ORDER — SODIUM CHLORIDE, SODIUM LACTATE, POTASSIUM CHLORIDE, CALCIUM CHLORIDE 600; 310; 30; 20 MG/100ML; MG/100ML; MG/100ML; MG/100ML
INJECTION, SOLUTION INTRAVENOUS CONTINUOUS
Status: DISCONTINUED | OUTPATIENT
Start: 2023-12-15 | End: 2023-12-15 | Stop reason: HOSPADM

## 2023-12-15 RX ORDER — NALOXONE HYDROCHLORIDE 0.4 MG/ML
0.4 INJECTION, SOLUTION INTRAMUSCULAR; INTRAVENOUS; SUBCUTANEOUS
Status: DISCONTINUED | OUTPATIENT
Start: 2023-12-15 | End: 2023-12-16 | Stop reason: HOSPADM

## 2023-12-15 RX ORDER — SODIUM CHLORIDE, SODIUM LACTATE, POTASSIUM CHLORIDE, CALCIUM CHLORIDE 600; 310; 30; 20 MG/100ML; MG/100ML; MG/100ML; MG/100ML
INJECTION, SOLUTION INTRAVENOUS CONTINUOUS
Status: DISCONTINUED | OUTPATIENT
Start: 2023-12-15 | End: 2023-12-15

## 2023-12-15 RX ORDER — FENTANYL CITRATE 50 UG/ML
50 INJECTION, SOLUTION INTRAMUSCULAR; INTRAVENOUS EVERY 5 MIN PRN
Status: DISCONTINUED | OUTPATIENT
Start: 2023-12-15 | End: 2023-12-15 | Stop reason: HOSPADM

## 2023-12-15 RX ORDER — METOPROLOL TARTRATE 1 MG/ML
1-2 INJECTION, SOLUTION INTRAVENOUS EVERY 5 MIN PRN
Status: DISCONTINUED | OUTPATIENT
Start: 2023-12-15 | End: 2023-12-15

## 2023-12-15 RX ORDER — HYDROMORPHONE HCL IN WATER/PF 6 MG/30 ML
0.2 PATIENT CONTROLLED ANALGESIA SYRINGE INTRAVENOUS EVERY 5 MIN PRN
Status: DISCONTINUED | OUTPATIENT
Start: 2023-12-15 | End: 2023-12-15

## 2023-12-15 RX ORDER — ONDANSETRON 2 MG/ML
4 INJECTION INTRAMUSCULAR; INTRAVENOUS EVERY 30 MIN PRN
Status: DISCONTINUED | OUTPATIENT
Start: 2023-12-15 | End: 2023-12-15

## 2023-12-15 RX ORDER — DEXTROSE, SODIUM CHLORIDE, SODIUM LACTATE, POTASSIUM CHLORIDE, AND CALCIUM CHLORIDE 5; .6; .31; .03; .02 G/100ML; G/100ML; G/100ML; G/100ML; G/100ML
INJECTION, SOLUTION INTRAVENOUS CONTINUOUS
Status: DISCONTINUED | OUTPATIENT
Start: 2023-12-15 | End: 2023-12-15

## 2023-12-15 RX ORDER — LABETALOL HYDROCHLORIDE 5 MG/ML
10 INJECTION, SOLUTION INTRAVENOUS
Status: DISCONTINUED | OUTPATIENT
Start: 2023-12-15 | End: 2023-12-15 | Stop reason: HOSPADM

## 2023-12-15 RX ORDER — FENTANYL CITRATE 50 UG/ML
INJECTION, SOLUTION INTRAMUSCULAR; INTRAVENOUS PRN
Status: DISCONTINUED | OUTPATIENT
Start: 2023-12-15 | End: 2023-12-15

## 2023-12-15 RX ORDER — HYDROMORPHONE HCL IN WATER/PF 6 MG/30 ML
0.2 PATIENT CONTROLLED ANALGESIA SYRINGE INTRAVENOUS EVERY 5 MIN PRN
Status: DISCONTINUED | OUTPATIENT
Start: 2023-12-15 | End: 2023-12-15 | Stop reason: HOSPADM

## 2023-12-15 RX ORDER — SODIUM CHLORIDE, SODIUM LACTATE, POTASSIUM CHLORIDE, CALCIUM CHLORIDE 600; 310; 30; 20 MG/100ML; MG/100ML; MG/100ML; MG/100ML
INJECTION, SOLUTION INTRAVENOUS CONTINUOUS
Status: DISCONTINUED | OUTPATIENT
Start: 2023-12-15 | End: 2023-12-16 | Stop reason: HOSPADM

## 2023-12-15 RX ORDER — OXYCODONE HYDROCHLORIDE 5 MG/1
5 TABLET ORAL EVERY 6 HOURS PRN
Status: DISCONTINUED | OUTPATIENT
Start: 2023-12-15 | End: 2023-12-16 | Stop reason: HOSPADM

## 2023-12-15 RX ORDER — SODIUM CHLORIDE, SODIUM LACTATE, POTASSIUM CHLORIDE, CALCIUM CHLORIDE 600; 310; 30; 20 MG/100ML; MG/100ML; MG/100ML; MG/100ML
INJECTION, SOLUTION INTRAVENOUS CONTINUOUS PRN
Status: DISCONTINUED | OUTPATIENT
Start: 2023-12-15 | End: 2023-12-15

## 2023-12-15 RX ORDER — ACETAMINOPHEN 325 MG/1
650 TABLET ORAL EVERY 4 HOURS PRN
Status: DISCONTINUED | OUTPATIENT
Start: 2023-12-15 | End: 2023-12-16 | Stop reason: HOSPADM

## 2023-12-15 RX ORDER — CEFAZOLIN SODIUM/WATER 2 G/20 ML
2 SYRINGE (ML) INTRAVENOUS SEE ADMIN INSTRUCTIONS
Status: DISCONTINUED | OUTPATIENT
Start: 2023-12-15 | End: 2023-12-15 | Stop reason: HOSPADM

## 2023-12-15 RX ADMIN — SODIUM CHLORIDE, SODIUM LACTATE, POTASSIUM CHLORIDE, CALCIUM CHLORIDE AND DEXTROSE MONOHYDRATE: 5; 600; 310; 30; 20 INJECTION, SOLUTION INTRAVENOUS at 11:33

## 2023-12-15 RX ADMIN — FENTANYL CITRATE 100 MCG: 50 INJECTION INTRAMUSCULAR; INTRAVENOUS at 14:51

## 2023-12-15 RX ADMIN — ACETAMINOPHEN 650 MG: 325 TABLET, FILM COATED ORAL at 08:31

## 2023-12-15 RX ADMIN — HYDROMORPHONE HYDROCHLORIDE 0.5 MG: 1 INJECTION, SOLUTION INTRAMUSCULAR; INTRAVENOUS; SUBCUTANEOUS at 09:37

## 2023-12-15 RX ADMIN — FENTANYL CITRATE 50 MCG: 50 INJECTION, SOLUTION INTRAMUSCULAR; INTRAVENOUS at 15:59

## 2023-12-15 RX ADMIN — ROCURONIUM BROMIDE 30 MG: 50 INJECTION, SOLUTION INTRAVENOUS at 14:51

## 2023-12-15 RX ADMIN — SODIUM CHLORIDE, POTASSIUM CHLORIDE, SODIUM LACTATE AND CALCIUM CHLORIDE: 600; 310; 30; 20 INJECTION, SOLUTION INTRAVENOUS at 17:16

## 2023-12-15 RX ADMIN — SUGAMMADEX 100 MG: 100 INJECTION, SOLUTION INTRAVENOUS at 15:27

## 2023-12-15 RX ADMIN — SODIUM CHLORIDE, POTASSIUM CHLORIDE, SODIUM LACTATE AND CALCIUM CHLORIDE: 600; 310; 30; 20 INJECTION, SOLUTION INTRAVENOUS at 20:07

## 2023-12-15 RX ADMIN — OXYCODONE HYDROCHLORIDE 5 MG: 5 TABLET ORAL at 18:45

## 2023-12-15 RX ADMIN — CEFAZOLIN 2 G: 1 INJECTION, POWDER, FOR SOLUTION INTRAMUSCULAR; INTRAVENOUS at 14:37

## 2023-12-15 RX ADMIN — PHENYLEPHRINE HYDROCHLORIDE 100 MCG: 10 INJECTION INTRAVENOUS at 15:00

## 2023-12-15 RX ADMIN — SODIUM CHLORIDE, POTASSIUM CHLORIDE, SODIUM LACTATE AND CALCIUM CHLORIDE 1000 ML: 600; 310; 30; 20 INJECTION, SOLUTION INTRAVENOUS at 09:38

## 2023-12-15 RX ADMIN — PROPOFOL 150 MG: 10 INJECTION, EMULSION INTRAVENOUS at 14:51

## 2023-12-15 RX ADMIN — HYDROMORPHONE HYDROCHLORIDE 0.5 MG: 1 INJECTION, SOLUTION INTRAMUSCULAR; INTRAVENOUS; SUBCUTANEOUS at 11:48

## 2023-12-15 RX ADMIN — DEXAMETHASONE SODIUM PHOSPHATE 4 MG: 4 INJECTION, SOLUTION INTRA-ARTICULAR; INTRALESIONAL; INTRAMUSCULAR; INTRAVENOUS; SOFT TISSUE at 14:51

## 2023-12-15 RX ADMIN — ONDANSETRON 4 MG: 2 INJECTION INTRAMUSCULAR; INTRAVENOUS at 09:57

## 2023-12-15 RX ADMIN — SODIUM CHLORIDE, POTASSIUM CHLORIDE, SODIUM LACTATE AND CALCIUM CHLORIDE: 600; 310; 30; 20 INJECTION, SOLUTION INTRAVENOUS at 14:42

## 2023-12-15 ASSESSMENT — ACTIVITIES OF DAILY LIVING (ADL)
ADLS_ACUITY_SCORE: 20

## 2023-12-15 NOTE — ANESTHESIA POSTPROCEDURE EVALUATION
Patient: Saskia Ayon    Procedure: Procedure(s):  LAPAROSCOPIC APPENDECTOMY       Anesthesia Type:  General    Note:  Disposition: Outpatient   Postop Pain Control: Uneventful            Sign Out: Well controlled pain   PONV: No   Neuro/Psych: Uneventful            Sign Out: Acceptable/Baseline neuro status   Airway/Respiratory: Uneventful            Sign Out: Acceptable/Baseline resp. status   CV/Hemodynamics: Uneventful            Sign Out: Acceptable CV status; No obvious hypovolemia; No obvious fluid overload   Other NRE: NONE   DID A NON-ROUTINE EVENT OCCUR? No           Last vitals:  Vitals Value Taken Time   /65 12/15/23 1630   Temp 98.5  F (36.9  C) 12/15/23 1610   Pulse 92 12/15/23 1633   Resp 11 12/15/23 1633   SpO2 100 % 12/15/23 1635   Vitals shown include unfiled device data.    Electronically Signed By: Armaan Leon MD  December 15, 2023  4:37 PM

## 2023-12-15 NOTE — PROVIDER NOTIFICATION
12/15/23 0730   Provider Notification   Provider Name/Title Dr. Logan   Method of Notification In Department   Request Evaluate - Remote   Notification Reason Patient Arrived     MD notified of patient arrival and reason for visit. Patient rates her upper to lower abdominal pain 10/10. Abdomen soft, no CTXs noted on monitor w/ TOCO. Patient states she had an RX for Flagyl called in for her from clinic but has not picked it up yet. Patient eating and drinking normally. No c/o of heartburn or constipation. No UTI symptoms.      VORB for complete abdominal ultrasound, urine analysis, and PO Tylenol 650 mg.

## 2023-12-15 NOTE — TELEPHONE ENCOUNTER
Having  comntractionsOB Triage Call      Is patient's OB/Midwife with the [facility] or [facility]? [facility]- Proceed with triage     Reason for call: labor    Assessment: woke up having contractions continuously for a hour she emptied her bladder and drank some fluids and she continues to have the contractions.  She denied any leakage of fluid    Plan: to go to Labor and delivery to be evaluated    Patient plans to deliver at [facility]     Patient's primary OB Provider is Dr Jamel Tolbert.      Per protocol recommendations Patient to be evaluated in L&D. Patient's primary OB is [facility].  Labor and delivery at [facility] ([phone]) notified of patient's pending arrival.  Report given to [facility].      Is patient's delivering hospital on divert? No      20w3d    Estimated Date of Delivery: 2024        OB History    Para Term  AB Living   3 2 2 0 0 2   SAB IAB Ectopic Multiple Live Births   0 0 0 0 2      # Outcome Date GA Lbr Michael/2nd Weight Sex Delivery Anes PTL Lv   3 Current            2 Term 01/15/22 39w4d 01:30 / 00:11 3.22 kg (7 lb 1.6 oz) M Vag-Spont None N [place]      Name: Jordi      Apgar1: 9  Apgar5: 9   1 Term 10/31/15 38w0d 00:56 / 00:19 2.489 kg (5 lb 7.8 oz) F Vag-Spont None N [place]      Name: Alis      Apgar1: 9  Apgar5: 9       Lab Results   Component Value Date    GBS  10/16/2015     Negative  No GBS DNA detected, presumed negative for GBS or number of bacteria may be   below the limit of detection of the assay.   Assay performed on incubated broth culture of specimen using [employer] real-time   PCR.            Judit Young RN 12/15/23 6:18 AM  [facility] Nurse Advisor
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
## 2023-12-15 NOTE — CONSULTS
[facility]  General Surgery Consult    Saskia Ayon MRN# [id number]   Age: 28 year old YOB: 1995     HPI:  History is obtained from the patient. Saskia Ayon is a 28 year old year old patient who is 20 weeks pregnant who has been experiencing acute periumbilical  pain for the past 9 hours associated with nausea, vomiting, and anorexia.  Negative for associated fever and chills. No similar episodes of pain in the past.     Review Of Systems:  The 10 point review of systems is negative other than noted in the HPI.    PMH:  Past Medical History:   Diagnosis Date    Genital herpes     HSV-2 infection complicating pregnancy, third trimester 12/31/2021    IUD (intrauterine device) in place 01/05/2016    Removed 11/18/2020    Urinary tract infection     Varicella        PSH:  Past Surgical History:   Procedure Laterality Date    INSERT INTRAUTERINE DEVICE  01/05/2016    Mirena    ORTHOPEDIC SURGERY      left foot       Allergies:  No Known Allergies    Home Medications:  Current Outpatient Medications   Medication Sig Dispense Refill    acetaminophen (TYLENOL) 325 MG tablet Take 2 tablets (650 mg) by mouth every 4 hours as needed for mild pain or fever (greater than or equal to 38  C /100.4  F (oral) or 38.5  C/ 101.4  F (core).)      acyclovir (ZOVIRAX) 400 MG tablet Take 1 tablet (400 mg) by mouth 3 times daily 60 tablet 2    ondansetron (ZOFRAN ODT) 4 MG ODT tab Take 1 tablet (4 mg) by mouth every 8 hours as needed for nausea 15 tablet 1    Prenatal Vit-Fe Fumarate-FA (PRENATAL VITAMIN) 27-0.8 MG TABS       clindamycin (CLEOCIN) 2 % vaginal cream Place 1 applicator vaginally at bedtime 40 g 0       Social History:  Social History     Tobacco Use    Smoking status: Former    Smokeless tobacco: Never   Vaping Use    Vaping Use: Never used   Substance Use Topics    Alcohol use: No     Alcohol/week: 0.0 standard drinks of alcohol    Drug use: No       Family  History:  No family history chronic diarrhea, inflammatory bowel disease or colon cancer.  No bleeding disorders, clotting disorders, or problems with anesthesia.    Physical Exam:  /58 (BP Location: Right arm)   Pulse 76   Temp 97.3  F (36.3  C) (Temporal)   Resp 17   Wt 50.3 kg (111 lb)   LMP 07/03/2023 (Approximate)   SpO2 99%   BMI 21.68 kg/m    General appearance: Resting Comfortably in bed, no apparent distress  Lungs: Breathing comfortably on room air  Heart: Regular rate and rhythm'  Abdomen: gravid with uterus ~ 2 cm above the umbilicus, nondistended, tender to palpation in periumbilical region without guarding, no palpable masses or hernias  Extremities: Without clubbing, cyanosis, edema  Neurologic: Grossly intact times four extremities, alert and oriented times three  Psychiatric: Mood and affect are appropriate  Skin: Without lesions or rashes      Labs Reviewed:  Lab Results   Component Value Date    WBC 7.9 12/15/2023    WBC 10.2 06/22/2021     Lab Results   Component Value Date    HGB 11.6 12/15/2023    HGB 12.1 06/22/2021     Lab Results   Component Value Date     12/15/2023     06/22/2021     Last Basic Metabolic Panel:  Lab Results   Component Value Date     12/15/2023     06/17/2019      Lab Results   Component Value Date    POTASSIUM 3.5 12/15/2023    POTASSIUM 3.4 06/17/2019     Lab Results   Component Value Date    CHLORIDE 102 12/15/2023    CHLORIDE 105 06/17/2019     Lab Results   Component Value Date    DANNY 8.7 12/15/2023    DANNY 8.8 06/17/2019     Lab Results   Component Value Date    CO2 21 12/15/2023    CO2 30 06/17/2019     Lab Results   Component Value Date    BUN 6.8 12/15/2023    BUN 11 06/17/2019     Lab Results   Component Value Date    CR 0.37 12/15/2023    CR 0.59 06/17/2019     Lab Results   Component Value Date     12/15/2023    GLC 82 06/17/2019       Radiology:  All imaging studies reviewed by me.    Results for orders placed or  performed during the hospital encounter of 12/15/23   US Abdomen Complete*    Narrative    ULTRASOUND ABDOMEN COMPLETE December 15, 2023 8:27 AM    CLINICAL HISTORY: Abdominal pain in pregnancy - 20w 3d.    TECHNIQUE: Complete abdominal ultrasound.    COMPARISON: Obstetric ultrasound 11/15/2023.    FINDINGS:    GALLBLADDER: Normal. No gallstones, wall thickening, or  pericholecystic fluid. Negative sonographic Morgan's sign. Gallbladder  polyp measuring up to 5-3 mm.    BILE DUCTS: No biliary dilatation. The common duct measures 2 mm.    LIVER: Normal parenchyma with smooth contour. No focal mass.    RIGHT KIDNEY: Normal size. Normal echogenicity with no hydronephrosis  or mass. Echogenic appearing renal pyramids.    LEFT KIDNEY: Normal size. Normal echogenicity with no hydronephrosis  or mass. Echogenic appearing renal pyramids.    SPLEEN: Normal. Small splenule noted.    PANCREAS: The visualized portions are normal.    AORTA: Normal in caliber.     IVC: Normal where visualized.    No ascites. Gravid uterus. Targeted ultrasound scanning of pain around  the umbilicus demonstrates no sonographic abnormality.      Impression    IMPRESSION:  1.  Bilateral echogenic appearing renal pyramids, which can be seen in  the setting of medullary sponge/nephrocalcinosis.  2.  No collecting system dilatation.  3.  Otherwise, unremarkable abdominal ultrasound.    WOLFGANG JAEGER MD         SYSTEM ID:  [id number]   MRI Abdomen *    Narrative    MR ABDOMEN WITHOUT CONTRAST December 15, 2023 11:11 AM    INDICATION: 20 weeks pregnant w/ acute abdominal pain, rule out  appendicitis versus other bowel issue.    COMPARISON: Abdominal ultrasound 12/15/2023.    TECHNIQUE: Routine abdomen protocol with multiple T1 and T2 axial and  coronal sequences without IV contrast.   CONTRAST: None.    FINDINGS: Visualized portions of the liver, gallbladder, pancreas,  spleen and adrenal glands are unremarkable. Benign appearing renal  cysts; no  follow-up necessary. Mild fullness of the right renal pelvis  is likely physiologic.    No bowel dilatation. Tubular blind ending appearing structure  (diameter of 1.2 cm) in the mid abdomen extending from the cecum with  internal T1 hyperintense foci (3/6; 5/5) with mild apparent wall  thickening most likely reflects the appendix, however it is difficult  to localize the terminal ileum. Trace probable surrounding fluid.     Trace abdominal free fluid. No organized fluid collection.    Gravid uterus. There may be mild funneling of the internal cervical os  (3/27; 2/17). Fetal structures are grossly unremarkable within the  limitations of the technique. Ovaries are unremarkable. Urinary  bladder is unremarkable.    No aggressive osseous lesion. Lung bases are unremarkable.      Impression    IMPRESSION:  1.  Findings suspicious for acute uncomplicated appendicitis (presumed  appendix measures 1.2 cm with appendicoliths). Terminal ileum is  difficult to localize in this region and presumed appendix could  reflect the terminal ileum, however this is considered less likely.  2.  Apparent funneling of the internal cervical os. Recommend pelvic  ultrasound.  3.  Otherwise, no acute findings within the abdomen or pelvis.    Findings were discussed with Dr. Logan on 12/15/2023 at 1:01 PM.       ASSESSMENT/PLAN:  The patient's history, physical exam, laboratory and imaging studies are suspicious for acute appendicitis.  I have offered the patient a laparoscopic appendectomy.  We discussed that there is an increased risk of converting to open and that there is risk of fetal complications from surgery, but it would be a greater risk to the fetus to have perforated appendicitis/sepsis.     We have had a detailed discussion of nature of appendicitis, the procedure, its risks, benefits, alternatives, recovery, postop limitations, anesthesia, bleeding, blood transfusion,  DVT, PE, postoperative infections, injury to adjacent  organs and structures, open conversion, bowel resection, prolonged convalescence in the event of gangrene or perforation of the appendix, abdominal wall hernia, intraabdominal adhesions which can lead to bowel obstruction.  We have discussed interventions and treatment for these complications.  The patient understands the possibility of a diagnosis other than appendicitis.  All questions have been answered to the best of my ability.      She elects to proceed.      Pre-operative antibiotics have been given.     Jennifer Lindo MD    Time spent with the patient, reviewing the EMR, reviewing laboratory and imaging studies, more than 50% of which was counseling and coordinating care:  40 minutes.

## 2023-12-15 NOTE — ANESTHESIA PROCEDURE NOTES
Airway       Patient location during procedure: OR       Procedure Start/Stop Times: 12/15/2023 2:53 PM  Staff -        Anesthesiologist:  Armaan Leon MD       CRNA: Jaqueline Cevallos APRN CRNA       Performed By: CRNA  Consent for Airway        Urgency: elective  Indications and Patient Condition       Indications for airway management: antoinette-procedural       Induction type:intravenous       Mask difficulty assessment: 1 - vent by mask    Final Airway Details       Final airway type: endotracheal airway       Successful airway: ETT - single  Endotracheal Airway Details        ETT size (mm): 7.0       Cuffed: yes       Cuff volume (mL): 8       Successful intubation technique: direct laryngoscopy       DL Blade Type: MAC 3       Grade View of Cords: 1       Adjucts: stylet       Position: Right       Measured from: gums/teeth       Secured at (cm): 22       Bite block used: None    Post intubation assessment        Placement verified by: capnometry, equal breath sounds and chest rise        Number of attempts at approach: 1       Number of other approaches attempted: 0       Secured with: tape       Ease of procedure: easy       Dentition: Intact and Unchanged    Medication(s) Administered   Medication Administration Time: 12/15/2023 2:53 PM

## 2023-12-15 NOTE — ANESTHESIA CARE TRANSFER NOTE
Patient: Saskia Ayon    Procedure: Procedure(s):  LAPAROSCOPIC APPENDECTOMY       Diagnosis: Encounter for triage in pregnant patient [Z36.89]  Acute appendicitis with localized peritonitis, unspecified whether abscess present, unspecified whether gangrene present, unspecified whether perforation present [K35.30]  Diagnosis Additional Information: No value filed.    Anesthesia Type:   General     Note:    Oropharynx: oropharynx clear of all foreign objects and spontaneously breathing  Level of Consciousness: awake  Oxygen Supplementation: face mask  Level of Supplemental Oxygen (L/min / FiO2): 6  Independent Airway: airway patency satisfactory and stable  Dentition: dentition unchanged  Vital Signs Stable: post-procedure vital signs reviewed and stable  Report to RN Given: handoff report given  Patient transferred to: PACU    Handoff Report: Identifed the Patient, Identified the Reponsible Provider, Reviewed the pertinent medical history, Discussed the surgical course, Reviewed Intra-OP anesthesia mangement and issues during anesthesia, Set expectations for post-procedure period and Allowed opportunity for questions and acknowledgement of understanding  Vitals:  Vitals Value Taken Time   BP     Temp     Pulse 92 12/15/23 1540   Resp 8 12/15/23 1540   SpO2 100 % 12/15/23 1540   Vitals shown include unfiled device data.    Electronically Signed By: CICI Pike CRNA  December 15, 2023  3:41 PM

## 2023-12-15 NOTE — OP NOTE
General Surgery Operative Note      Pre-operative diagnosis: acute appendicitis   Post-operative diagnosis: Possible acute appendicitis    Procedure: laparoscopic appendectomy   Surgeon: Jennifer Lindo MD   Assistant(s): None   Anesthesia: General    Estimated blood loss:  Complications: 20 ml  None   Specimens: ID Type Source Tests Collected by Time Destination   1 : APPENDIX Tissue Appendix SURGICAL PATHOLOGY EXAM Jennifer Lindo MD 12/15/2023  3:18 PM         INDICATION FOR PROCEDURE: This is a 28 year old female who presented with abdominal pain that woke her from sleep this morning. The pain was periumbilical. MRI of the abdomen was consistent with possible acute appendicitis without evidence for abscess or perforation. We discussed operative management of appendicitis including risks and benefits during pregnancy, and the patient agreed to proceed.    DESCRIPTION OF PROCEDURE:  The patient was placed on the table in supine position.  General endotracheal anesthesia was induced and the abdomen was prepped and draped in standard sterile fashion.  The uterine fundus was palpated, a supraumbilical incision was made, and a 12 mm Any Trocar was placed under direct visualization.  Pneumoperitoneum was established and the abdomen was surveyed. A 5 mm trocar was placed in the subxiphoid region, and a 5 mm trocar was placed in the right lateral abdomen.  The patient was placed in Trendelenburg and right side up.  The appendix was identified in the right mid abdomen.  It appeared elongated and mildly dilated, but not inflamed.  The mesoappendix was divided with a ligasure device.  The base of the appendix was healthy-appearing and was divided using a white load of the endo-ZURDO stapler.  The appendix was passed into an Endocatch bag and removed through the 12mm trocar site.  The right abdomen was inspected and, aside from a gravid uterus, no abnormalities were identified. The right fallopian tube and ovary  appeared normal, as did the gallbladder and all visible small and large intestine. Hemostasis was assured.  We irrigated with sterile saline and aspirated the effluent.  The two 5 mm trocars were removed under direct visualization to ensure no bleeding from port sites. The abdomen was evacuated of CO2.  The 12mm port site fascia was closed with 0 vicryl.  The skin of all 3 incisions was anesthetized with local anesthetic and closed with interrupted 4-0 Vicryl subcuticular sutures and skin glue.  The patient tolerated the procedure well.  Sponge and instrument counts were correct.    FINDINGS: Possible early/mild acute appendicitis without perforation. Appendix mildly dilated, but otherwise not inflamed.     Jennifer Lindo MD

## 2023-12-15 NOTE — PROVIDER NOTIFICATION
12/15/23 1112   Provider Notification   Provider Name/Title Dr. Logan   Method of Notification In Department   Request Evaluate - Remote   Notification Reason Other (Comment)  (MRI done, results pending, want any IV fluids? MD SHELL for D5LR continuous infusion at maintenance dose)

## 2023-12-15 NOTE — ANESTHESIA PREPROCEDURE EVALUATION
Anesthesia Pre-Procedure Evaluation    Patient: Saskia Ayon   MRN: [id number] : 1995        Procedure : Procedure(s):  LAPAROSCOPIC APPENDECTOMY          Past Medical History:   Diagnosis Date    Genital herpes     HSV-2 infection complicating pregnancy, third trimester 2021    IUD (intrauterine device) in place 2016    Removed 2020    Urinary tract infection     Varicella       Past Surgical History:   Procedure Laterality Date    INSERT INTRAUTERINE DEVICE  2016    Mirena    ORTHOPEDIC SURGERY      left foot      No Known Allergies   Social History     Tobacco Use    Smoking status: Former    Smokeless tobacco: Never   Substance Use Topics    Alcohol use: No     Alcohol/week: 0.0 standard drinks of alcohol      Wt Readings from Last 1 Encounters:   23 50.3 kg (111 lb)        Anesthesia Evaluation   Pt has had prior anesthetic. Type: General.        ROS/MED HX  ENT/Pulmonary:  - neg pulmonary ROS     Neurologic:  - neg neurologic ROS     Cardiovascular:  - neg cardiovascular ROS     METS/Exercise Tolerance:     Hematologic: Comments: Lab Test        12/15/23     09/07/23     11/01/21                       0930          0854          1513          WBC          7.9          5.4          7.1           HGB          11.6*        13.6         9.9*          MCV          88           88           89            PLT          262          278          296            Lab Test        12/15/23     06/17/19                       0930          1800          NA           135          141           POTASSIUM    3.5          3.4           CHLORIDE     102          105           CO2          21*          30            BUN          6.8          11            CR           0.37*        0.59          ANIONGAP     12           6             DANNY          8.7          8.8           GLC          110*         82                  Musculoskeletal:  - neg musculoskeletal ROS     GI/Hepatic:    "  (+)         appendicitis,           Renal/Genitourinary:  - neg Renal ROS     Endo:  - neg endo ROS     Psychiatric/Substance Use:       Infectious Disease:  - neg infectious disease ROS     Malignancy:  - neg malignancy ROS     Other:      (+) Possibly pregnant, , ,         Physical Exam    Airway        Mallampati: II   TM distance: > 3 FB   Neck ROM: full   Mouth opening: > 3 cm    Respiratory Devices and Support         Dental       (+) Minor Abnormalities - some fillings, tiny chips      Cardiovascular   cardiovascular exam normal          Pulmonary   pulmonary exam normal                OUTSIDE LABS:  CBC:   Lab Results   Component Value Date    WBC 7.9 12/15/2023    WBC 5.4 09/07/2023    HGB 11.6 (L) 12/15/2023    HGB 13.6 09/07/2023    HCT 33.9 (L) 12/15/2023    HCT 39.8 09/07/2023     12/15/2023     09/07/2023     BMP:   Lab Results   Component Value Date     12/15/2023     06/17/2019    POTASSIUM 3.5 12/15/2023    POTASSIUM 3.4 06/17/2019    CHLORIDE 102 12/15/2023    CHLORIDE 105 06/17/2019    CO2 21 (L) 12/15/2023    CO2 30 06/17/2019    BUN 6.8 12/15/2023    BUN 11 06/17/2019    CR 0.37 (L) 12/15/2023    CR 0.59 06/17/2019     (H) 12/15/2023    GLC 82 06/17/2019     COAGS: No results found for: \"PTT\", \"INR\", \"FIBR\"  POC:   Lab Results   Component Value Date    HCG Negative 05/16/2018     HEPATIC:   Lab Results   Component Value Date    ALBUMIN 4.1 12/15/2023    PROTTOTAL 7.6 12/15/2023    ALT 9 12/15/2023    AST 19 12/15/2023    ALKPHOS 71 12/15/2023    BILITOTAL 0.2 12/15/2023     OTHER:   Lab Results   Component Value Date    DANNY 8.7 12/15/2023       Anesthesia Plan    ASA Status:  2       Anesthesia Type: General.     - Airway: Tracheostomy   Induction: Intravenous, Propofol.   Maintenance: Balanced.        Consents    Anesthesia Plan(s) and associated risks, benefits, and realistic alternatives discussed. Questions answered and patient/representative(s) expressed " understanding.     - Discussed:     - Discussed with:  Patient      - Extended Intubation/Ventilatory Support Discussed: No.      - Patient is DNR/DNI Status: No     Use of blood products discussed: No .     Postoperative Care    Pain management: IV analgesics.   PONV prophylaxis: Dexamethasone or Solumedrol, Ondansetron (or other 5HT-3)     Comments:               Armaan Leon MD    I have reviewed the pertinent notes and labs in the chart from the past 30 days and (re)examined the patient.  Any updates or changes from those notes are reflected in this note.     # Hyponatremia: Lowest Na = 135 mmol/L in last 30 days, will monitor as appropriate

## 2023-12-15 NOTE — PROVIDER NOTIFICATION
12/15/23 0935   Provider Notification   Provider Name/Title Dr. Logan   Method of Notification Electronic Page;Phone   Request Evaluate - Remote   Notification Reason Other (Comment)  (medication request)     Pt now N/V, TORB for 4mg IV zofran Q6h. MD spoke with radiologist, and will be doing a MRI rather than a CT per radiologist's preference. Closer to 1100 for MRI.

## 2023-12-15 NOTE — PROGRESS NOTES
[facility] OB Addendum      I reviewed MRI report below and d/w radiologist. Rads does feel findings are suspicious for appendicitis.  Will get stat Gen Surg consult.  Pt has been NPO since 5 AM with the except small amount of apple juice around 8 AM.    Houston Logan MD    MRI Abdomen *    Narrative    MR ABDOMEN WITHOUT CONTRAST December 15, 2023 11:11 AM    INDICATION: 20 weeks pregnant w/ acute abdominal pain, rule out  appendicitis versus other bowel issue.    COMPARISON: Abdominal ultrasound 12/15/2023.    TECHNIQUE: Routine abdomen protocol with multiple T1 and T2 axial and  coronal sequences without IV contrast.   CONTRAST: None.    FINDINGS: Visualized portions of the liver, gallbladder, pancreas,  spleen and adrenal glands are unremarkable. Benign appearing renal  cysts; no follow-up necessary. Mild fullness of the right renal pelvis  is likely physiologic.    No bowel dilatation. Tubular blind ending appearing structure  (diameter of 1.2 cm) extending from the cecum with internal T1  hyperintense foci (3/6; 5/5) with mild apparent wall thickening most  likely reflects the appendix, however it is difficult to localize the  terminal ileum. Trace probable surrounding fluid.     Trace abdominal free fluid. No organized fluid collection.    Gravid uterus. There may be mild funneling of the internal cervical os  (3/27; 2/17). Fetal structures are grossly unremarkable within the  limitations of the technique. Ovaries are unremarkable. Urinary  bladder is unremarkable.    No aggressive osseous lesion. Lung bases are unremarkable.      Impression    IMPRESSION:  1.  Findings suspicious for acute uncomplicated appendicitis (presumed  appendix measures 1.2 cm with appendicoliths). Terminal ileum is  difficult to localize in this region and presumed appendix could  reflect the terminal ileum, however this is considered less likely.  2.  Questionable funneling of the internal cervical os. Consider  pelvic  ultrasound as clinically indicated.  3.  Otherwise, no acute findings within the abdomen or pelvis.

## 2023-12-15 NOTE — PROVIDER NOTIFICATION
12/15/23 0857   Provider Notification   Provider Name/Title Dr. Logan   Method of Notification At Bedside     Performed SVE and spec exam, closed. Pt still in writhing pain. MD ordered CT scan for r/o appendicitis. MD placing CT and IV dilaudid orders, VORB for 1L LR bolus, place IV, give IV dilaudid for pain, CBC/CMP/Type & Screen.

## 2023-12-15 NOTE — PROGRESS NOTES
Data: [facility] presented [facility]: 12/15/2023  6:41 AM.  Reason for maternal/fetal assessment is uterine contractions. Patient reports for the last hour she is having constant contractions, pt describes the pain as starting at the top of the abdomen going down and across the lower abdomen.  Pt denies bleeding, leaking of fluid or recent intercourse.  Pt reports a bowel movement this morning. Patient is a .  Prenatal record reviewed. Pregnancy has been uncomplicated.  Gestational Age 20w3d. VSS. Fetal movement active. Patient denies uterine contractions. Support person is present.   Action: Verbal consent for EFM. Triage assessment completed. Bill of rights reviewed.  Response: Patient verbalized agreement with plan. Will contact Dr Houston Logan with update and for further orders.

## 2023-12-15 NOTE — PROGRESS NOTES
Patient Transfer Information  Patient connected to monitoring equipment on arrival: yes Vital signs monitor, Continuous pulse oximetry, and Capnography     Patient connected to wall oxygen on arrival: NA    Belongings: Transferred with patient    Safety check completed: Yes

## 2023-12-15 NOTE — PROVIDER NOTIFICATION
12/15/23 0854   Provider Notification   Provider Name/Title Dr. Logan   Method of Notification Electronic Page;Phone   Request Evaluate - Remote   Notification Reason Status Update;Patient Request     Preliminary results for US appear normal and nothing to explain any abdominal pain. Pt is asking if there is any other medication she can have besides the tylenol, and would like to have a cervical exam. MD is coming over to unit to assess pt.

## 2023-12-15 NOTE — PROVIDER NOTIFICATION
12/15/23 1355   Comments   Comments pt wheeled down to pre-op for surgery, general surgeon will consult pt down there

## 2023-12-15 NOTE — PROVIDER NOTIFICATION
12/15/23 1600   Fetal Assessment   Fetal HR Assessment Method intermittent auscultation, using Doppler   Fetal HR (beats/min) 150   Fetal HR Baseline normal range     FHT checked with Doppler 150's regular.   Patient notified and stated that she will not take the Atorvastatin due to making her feel like a zombie .

## 2023-12-16 VITALS
RESPIRATION RATE: 16 BRPM | DIASTOLIC BLOOD PRESSURE: 54 MMHG | WEIGHT: 111 LBS | SYSTOLIC BLOOD PRESSURE: 94 MMHG | OXYGEN SATURATION: 98 % | HEART RATE: 86 BPM | TEMPERATURE: 98.3 F | BODY MASS INDEX: 21.68 KG/M2

## 2023-12-16 PROCEDURE — 99212 OFFICE O/P EST SF 10 MIN: CPT | Performed by: OBSTETRICS & GYNECOLOGY

## 2023-12-16 RX ORDER — ACETAMINOPHEN 500 MG
500-1000 TABLET ORAL EVERY 6 HOURS PRN
Status: ON HOLD | COMMUNITY
Start: 2023-12-16 | End: 2024-03-26

## 2023-12-16 RX ORDER — AMOXICILLIN 250 MG
1 CAPSULE ORAL 2 TIMES DAILY PRN
Qty: 20 TABLET | Refills: 0 | Status: SHIPPED | OUTPATIENT
Start: 2023-12-16 | End: 2024-03-15

## 2023-12-16 RX ORDER — OXYCODONE HYDROCHLORIDE 5 MG/1
5 TABLET ORAL EVERY 6 HOURS PRN
Qty: 5 TABLET | Refills: 0 | Status: SHIPPED | OUTPATIENT
Start: 2023-12-16 | End: 2024-03-15

## 2023-12-16 ASSESSMENT — ACTIVITIES OF DAILY LIVING (ADL)
ADLS_ACUITY_SCORE: 20

## 2023-12-16 NOTE — PROGRESS NOTES
[facility]   General Surgery Progress Note           Assessment and Plan:   Assessment:   -Abdominal pain with MRI of the abdomen was consistent with possible acute appendicitis without evidence for abscess or perforation   -20 weeks IUP  -1 Day Post-Op laparoscopic appendectomy, intraoperative findings of possible early acute appendisitis; Pathology: pending  -Afebrile      Plan:   -ADAT, Pain management with acetaminophen, prn oxycodone (2-4 days max would be expected postop, then transition to OTC)  -Postoperative activity restrictions: 20 lbs x 4 weeks.  -Dispo: Normally would have discharged from PACU.  Ongoing eval/treatment per OB/GYN.  Discharge instructions in AVS.  Postoperative follow up via phone call in 2-3 weeks.  RTC if needed.    Seen and agree; thankfully is feeling much better despite operative findings. Okay for discharge from our perspective.         Interval History:   Pre-op pain resolved.  Mild incision soreness with movement as expected.  No nausea, tolerating PO intake, had some referred shoulder soreness (laparoscopy-now resolved), occasional burping but is passing flatus.  Reviewed pain meds, patient prefers to remain on acetaminophen, but requests to have a few oxycodone on hand at home; will also start stool softener.  Also recommended soft PO intake in small amounts and good fluid intake until +BM.  Activity restrictions reviewed.          Physical Exam:   Blood pressure 94/54, pulse 86, temperature 98.3  F (36.8  C), temperature source Oral, resp. rate 16, weight 50.3 kg (111 lb), last menstrual period 07/03/2023, SpO2 98%, not currently breastfeeding.  I/O last 3 completed shifts:  In: 1750 [P.O.:1000; I.V.:750]  Out: -   Abdomen:   Soft/pregnant, non-distended, tenderness noted near incisions and normal bowel sounds   Incisions - clean, dry, skin glue intact with bandaids.            Data:   Pathology: pending    Recent Labs   Lab 12/15/23  0930   WBC 7.9   HGB 11.6*    HCT 33.9*   MCV 88        Recent Labs   Lab 12/15/23  0930      POTASSIUM 3.5   CHLORIDE 102   CO2 21*   ANIONGAP 12   *   BUN 6.8   CR 0.37*   GFRESTIMATED >90   DANNY 8.7   PROTTOTAL 7.6   ALBUMIN 4.1   BILITOTAL 0.2   ALKPHOS 71   AST 19   ALT 9     12/15 MR ABDOMEN WITHOUT CONTRAST   IMPRESSION:  1.  Findings suspicious for early acute uncomplicated appendicitis  (presumed appendix measures 1.2 cm with appendicoliths). Terminal  ileum is difficult to localize in this region and presumed appendix  could reflect the terminal ileum, however this is considered less  likely.  2.  Apparent funneling of the internal cervical os. Recommend pelvic  ultrasound.  3.  Otherwise, no acute findings within the abdomen or pelvis.    Shanna Smith PA-C

## 2023-12-16 NOTE — PLAN OF CARE
Discharge Note    Patient discharged to home via private vehicle  accompanied by sister.  IV: Discontinued  Prescriptions filled and given to patient/family.   Belongings reviewed and sent with patient.   Home medications returned to patient: NA  Equipment sent with: patient, N/A.   patient verbalizes understanding of discharge instructions. AVS given to patient.  Additional education completed?  Incision care, medication administration and when to call

## 2023-12-16 NOTE — DISCHARGE SUMMARY
[facility] Discharge Summary    Saskia Ayon MRN# [id number]   Age: 28 year old YOB: 1995     Date of Admission:  12/15/2023  Date of Discharge::  [unfilled]  Admitting Physician:  Houston Logan MD  Discharge Physician:  Daylin Alarcon MD      Home clinic: [facility]          Admission Diagnoses:   1. Intrauterine pregnancy at 20w4d   2. Abdominal pain and suspected appendicitis          Discharge Diagnosis:     1. Status post laparoscopy with appendectomy          Procedures:     Procedure(s):  Laparoscopy with appendectomy    Dr. Lindo           Medications Prior to Admission:     Medications Prior to Admission   Medication Sig Dispense Refill Last Dose    acyclovir (ZOVIRAX) 400 MG tablet Take 1 tablet (400 mg) by mouth 3 times daily 60 tablet 2 More than a month    ondansetron (ZOFRAN ODT) 4 MG ODT tab Take 1 tablet (4 mg) by mouth every 8 hours as needed for nausea 15 tablet 1 More than a month    Prenatal Vit-Fe Fumarate-FA (PRENATAL VITAMIN) 27-0.8 MG TABS    Past Week    clindamycin (CLEOCIN) 2 % vaginal cream Place 1 applicator vaginally at bedtime 40 g 0     [DISCONTINUED] acetaminophen (TYLENOL) 325 MG tablet Take 2 tablets (650 mg) by mouth every 4 hours as needed for mild pain or fever (greater than or equal to 38  C /100.4  F (oral) or 38.5  C/ 101.4  F (core).)   Past Week             Discharge Medications:     Current Discharge Medication List        START taking these medications    Details   oxyCODONE (ROXICODONE) 5 MG tablet Take 1 tablet (5 mg) by mouth every 6 hours as needed for severe pain  Qty: 5 tablet, Refills: 0    Associated Diagnoses: Acute appendicitis with localized peritonitis, unspecified whether abscess present, unspecified whether gangrene present, unspecified whether perforation present; S/P laparoscopic appendectomy      senna-docusate (SENOKOT S) 8.6-50 MG tablet Take 1 tablet by mouth 2 times daily as needed for constipation  Qty: 20  tablet, Refills: 0    Comments: Take with meal.  Associated Diagnoses: Acute appendicitis with localized peritonitis, unspecified whether abscess present, unspecified whether gangrene present, unspecified whether perforation present; S/P laparoscopic appendectomy           CONTINUE these medications which have CHANGED    Details   acetaminophen (TYLENOL) 500 MG tablet Take 1-2 tablets (500-1,000 mg) by mouth every 6 hours as needed for pain    Associated Diagnoses: Acute appendicitis with localized peritonitis, unspecified whether abscess present, unspecified whether gangrene present, unspecified whether perforation present; S/P laparoscopic appendectomy           CONTINUE these medications which have NOT CHANGED    Details   acyclovir (ZOVIRAX) 400 MG tablet Take 1 tablet (400 mg) by mouth 3 times daily  Qty: 60 tablet, Refills: 2    Associated Diagnoses: Hx of herpes genitalis      ondansetron (ZOFRAN ODT) 4 MG ODT tab Take 1 tablet (4 mg) by mouth every 8 hours as needed for nausea  Qty: 15 tablet, Refills: 1    Associated Diagnoses: Normal pregnancy, second trimester      Prenatal Vit-Fe Fumarate-FA (PRENATAL VITAMIN) 27-0.8 MG TABS       clindamycin (CLEOCIN) 2 % vaginal cream Place 1 applicator vaginally at bedtime  Qty: 40 g, Refills: 0    Associated Diagnoses: Vaginal discharge                   Consultations:   Consultation during this admission received from surgery          Brief History of Illness:   Saskia Ayon is a 28 year old female admitted at 20w3d  gestation with abdominal pain.           Hospital Course:    MRI suspicious for acute appendicitis.           Discharge Instructions and Follow-Up:     Discharge diet: Regular   Discharge activity: Lifting restricted to 15 pounds   Discharge follow-up: As scheduled in OB clinic   Wound care Keep wound clean and dry. OK to shower.           Discharge Disposition:     Discharged to home        Daylin Alarcon MD  [facility] Obstetrics  and Gynecology  [facility]  12/16/2023

## 2023-12-16 NOTE — PROGRESS NOTES
[facility] OB Addendum    Transvaginal U/S showed Cx 3.4 cm long with no funneling.    Houston Logan MD

## 2023-12-16 NOTE — PLAN OF CARE
Goal Outcome Evaluation:    END OF SHIFT SUMMARY     1900 - 0700    Pt alert and oriented  x4, denies pain, on capno, saturating 96%. C/o abdominal pain, ice applied. Tolerating diet. Up with SBA. Voiding adequately. IVF infusing. Will continue with POC.

## 2023-12-19 LAB
PATH REPORT.COMMENTS IMP SPEC: NORMAL
PATH REPORT.COMMENTS IMP SPEC: NORMAL
PATH REPORT.FINAL DX SPEC: NORMAL
PATH REPORT.GROSS SPEC: NORMAL
PATH REPORT.MICROSCOPIC SPEC OTHER STN: NORMAL
PATH REPORT.RELEVANT HX SPEC: NORMAL
PHOTO IMAGE: NORMAL

## 2023-12-20 ENCOUNTER — ANCILLARY PROCEDURE (OUTPATIENT)
Dept: ULTRASOUND IMAGING | Facility: CLINIC | Age: 28
End: 2023-12-20
Attending: OBSTETRICS & GYNECOLOGY

## 2023-12-20 DIAGNOSIS — R21 RASH: Primary | ICD-10-CM

## 2023-12-20 PROCEDURE — 76805 OB US >/= 14 WKS SNGL FETUS: CPT | Performed by: OBSTETRICS & GYNECOLOGY

## 2023-12-20 RX ORDER — CLOBETASOL PROPIONATE 0.5 MG/G
CREAM TOPICAL 2 TIMES DAILY
Qty: 45 G | Refills: 0 | Status: ON HOLD | OUTPATIENT
Start: 2023-12-20 | End: 2024-03-26

## 2024-01-02 ENCOUNTER — TELEPHONE (OUTPATIENT)
Dept: SURGERY | Facility: CLINIC | Age: 29
End: 2024-01-02
Payer: MEDICAID

## 2024-01-02 DIAGNOSIS — Z98.890 POSTOPERATIVE STATE: Primary | ICD-10-CM

## 2024-01-02 NOTE — TELEPHONE ENCOUNTER
[facility]   Postoperative Follow-up Phone Call  -Call to patient to review recent procedure and recovery    Attempted to call patient for post op check.  No answer.  Message was left for patient to call back if they had any questions or concerns.   Ylopo message sent.    Shanan Smith PA-C       Ylopo message:   Roselia Rutherford,     I left a message on your ph# to call if you have questions/concerns after your appendix surgery.    I also wanted to pass on to you that your pathology report confirmed: mild acute appendicitis.    You may now remove skin glue or tapes from incision sites.    You may slowly increase activity as tolerated.  Resume workout/strenuous activity at 1 month after surgery.    Please contact me if other questions.    Thank you!  Shanna Smith PA-C

## 2024-02-27 ENCOUNTER — PRENATAL OFFICE VISIT (OUTPATIENT)
Dept: OBGYN | Facility: CLINIC | Age: 29
End: 2024-02-27

## 2024-02-27 VITALS — BODY MASS INDEX: 22.85 KG/M2 | SYSTOLIC BLOOD PRESSURE: 110 MMHG | DIASTOLIC BLOOD PRESSURE: 60 MMHG | WEIGHT: 117 LBS

## 2024-02-27 DIAGNOSIS — O99.891 PAIN IN SYMPHYSIS PUBIS DURING PREGNANCY: ICD-10-CM

## 2024-02-27 DIAGNOSIS — O99.810 ABNORMAL MATERNAL GLUCOSE TOLERANCE, ANTEPARTUM: Primary | ICD-10-CM

## 2024-02-27 DIAGNOSIS — M79.18 PAIN IN SYMPHYSIS PUBIS DURING PREGNANCY: ICD-10-CM

## 2024-02-27 DIAGNOSIS — Z34.80 SUPERVISION OF OTHER NORMAL PREGNANCY, ANTEPARTUM: ICD-10-CM

## 2024-02-27 LAB
ERYTHROCYTE [DISTWIDTH] IN BLOOD BY AUTOMATED COUNT: 12 % (ref 10–15)
GLUCOSE 1H P 50 G GLC PO SERPL-MCNC: 136 MG/DL (ref 70–129)
HCT VFR BLD AUTO: 29.2 % (ref 35–47)
HGB BLD-MCNC: 9.6 G/DL (ref 11.7–15.7)
MCH RBC QN AUTO: 27.6 PG (ref 26.5–33)
MCHC RBC AUTO-ENTMCNC: 32.9 G/DL (ref 31.5–36.5)
MCV RBC AUTO: 84 FL (ref 78–100)
PLATELET # BLD AUTO: 305 10E3/UL (ref 150–450)
RBC # BLD AUTO: 3.48 10E6/UL (ref 3.8–5.2)
WBC # BLD AUTO: 8.3 10E3/UL (ref 4–11)

## 2024-02-27 PROCEDURE — 36415 COLL VENOUS BLD VENIPUNCTURE: CPT | Performed by: OBSTETRICS & GYNECOLOGY

## 2024-02-27 PROCEDURE — 87086 URINE CULTURE/COLONY COUNT: CPT | Performed by: OBSTETRICS & GYNECOLOGY

## 2024-02-27 PROCEDURE — 82950 GLUCOSE TEST: CPT | Performed by: OBSTETRICS & GYNECOLOGY

## 2024-02-27 PROCEDURE — 99207 PR PRENATAL VISIT: CPT | Performed by: OBSTETRICS & GYNECOLOGY

## 2024-02-27 PROCEDURE — 85027 COMPLETE CBC AUTOMATED: CPT | Performed by: OBSTETRICS & GYNECOLOGY

## 2024-02-27 PROCEDURE — 86780 TREPONEMA PALLIDUM: CPT | Performed by: OBSTETRICS & GYNECOLOGY

## 2024-02-27 NOTE — NURSING NOTE
Chief Complaint   Patient presents with    Prenatal Care     31 weeks       Initial /60 (BP Location: Left arm, Patient Position: Chair, Cuff Size: Adult Regular)   Wt 53.1 kg (117 lb)   LMP 2023 (Approximate)   Breastfeeding No   BMI 22.85 kg/m   Estimated body mass index is 22.85 kg/m  as calculated from the following:    Height as of 23: 1.524 m (5').    Weight as of this encounter: 53.1 kg (117 lb).  BP completed using cuff size: regular    Questioned patient about current smoking habits.  Pt. has never smoked.          The following HM Due: NONE  +fetal movement  +pain mid abdomen to pubic area  Aimee Cope, CMA

## 2024-02-27 NOTE — LETTER
Sangeeta Ayon was seen in our clinic today for prenatal care. If any questions or concerns, feel free to contact me.      Sincerely, Carmela Menon MD

## 2024-02-28 LAB
BACTERIA UR CULT: NO GROWTH
T PALLIDUM AB SER QL: NONREACTIVE
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
## 2024-03-15 ENCOUNTER — LAB (OUTPATIENT)
Dept: LAB | Facility: CLINIC | Age: 29
End: 2024-03-15

## 2024-03-15 ENCOUNTER — PRENATAL OFFICE VISIT (OUTPATIENT)
Dept: OBGYN | Facility: CLINIC | Age: 29
End: 2024-03-15

## 2024-03-15 VITALS — BODY MASS INDEX: 23.05 KG/M2 | WEIGHT: 118 LBS | DIASTOLIC BLOOD PRESSURE: 70 MMHG | SYSTOLIC BLOOD PRESSURE: 110 MMHG

## 2024-03-15 DIAGNOSIS — O99.810 ABNORMAL MATERNAL GLUCOSE TOLERANCE, ANTEPARTUM: ICD-10-CM

## 2024-03-15 DIAGNOSIS — Z23 ENCOUNTER FOR IMMUNIZATION: Primary | ICD-10-CM

## 2024-03-15 PROCEDURE — 36415 COLL VENOUS BLD VENIPUNCTURE: CPT

## 2024-03-15 PROCEDURE — 90715 TDAP VACCINE 7 YRS/> IM: CPT | Performed by: OBSTETRICS & GYNECOLOGY

## 2024-03-15 PROCEDURE — 82951 GLUCOSE TOLERANCE TEST (GTT): CPT

## 2024-03-15 PROCEDURE — 99207 PR PRENATAL VISIT: CPT | Performed by: OBSTETRICS & GYNECOLOGY

## 2024-03-15 PROCEDURE — 90471 IMMUNIZATION ADMIN: CPT | Performed by: OBSTETRICS & GYNECOLOGY

## 2024-03-15 PROCEDURE — 82952 GTT-ADDED SAMPLES: CPT

## 2024-03-15 NOTE — NURSING NOTE
Chief Complaint   Patient presents with    Prenatal Care     33 3/7 weeks       Initial /70 (BP Location: Left arm, Patient Position: Chair, Cuff Size: Adult Regular)   Wt 53.5 kg (118 lb)   LMP 2023 (Approximate)   Breastfeeding No   BMI 23.05 kg/m   Estimated body mass index is 23.05 kg/m  as calculated from the following:    Height as of 23: 1.524 m (5').    Weight as of this encounter: 53.5 kg (118 lb).  BP completed using cuff size: regular    Questioned patient about current smoking habits.  Pt. has never smoked.          The following HM Due: NONE    +fetal movement  Aimee Cope, CMA
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
## 2024-03-16 LAB
GESTATIONAL GTT 1 HR POST DOSE: 152 MG/DL (ref 60–179)
GESTATIONAL GTT 2 HR POST DOSE: 130 MG/DL (ref 60–154)
GESTATIONAL GTT 3 HR POST DOSE: 100 MG/DL (ref 60–139)
GLUCOSE P FAST SERPL-MCNC: 86 MG/DL (ref 60–94)

## 2024-03-26 ENCOUNTER — NURSE TRIAGE (OUTPATIENT)
Dept: NURSING | Facility: CLINIC | Age: 29
End: 2024-03-26
Payer: MEDICAID

## 2024-03-26 ENCOUNTER — HOSPITAL ENCOUNTER (OUTPATIENT)
Facility: CLINIC | Age: 29
Discharge: HOME | End: 2024-03-26
Attending: FAMILY MEDICINE | Admitting: FAMILY MEDICINE
Payer: MEDICAID

## 2024-03-26 VITALS — TEMPERATURE: 98.2 F | SYSTOLIC BLOOD PRESSURE: 97 MMHG | DIASTOLIC BLOOD PRESSURE: 52 MMHG | RESPIRATION RATE: 16 BRPM

## 2024-03-26 DIAGNOSIS — N76.0 BV (BACTERIAL VAGINOSIS): ICD-10-CM

## 2024-03-26 DIAGNOSIS — N30.00 ACUTE CYSTITIS WITHOUT HEMATURIA: Primary | ICD-10-CM

## 2024-03-26 DIAGNOSIS — B96.89 BV (BACTERIAL VAGINOSIS): ICD-10-CM

## 2024-03-26 PROBLEM — Z36.89 ENCOUNTER FOR TRIAGE IN PREGNANT PATIENT: Status: ACTIVE | Noted: 2023-12-15

## 2024-03-26 LAB
ALBUMIN UR-MCNC: 30 MG/DL
APPEARANCE UR: ABNORMAL
BACTERIA #/AREA URNS HPF: ABNORMAL /HPF
BILIRUB UR QL STRIP: NEGATIVE
CLUE CELLS: PRESENT
COLOR UR AUTO: YELLOW
GLUCOSE UR STRIP-MCNC: NEGATIVE MG/DL
HGB UR QL STRIP: NEGATIVE
KETONES UR STRIP-MCNC: NEGATIVE MG/DL
LEUKOCYTE ESTERASE UR QL STRIP: ABNORMAL
MUCOUS THREADS #/AREA URNS LPF: PRESENT /LPF
NITRATE UR QL: NEGATIVE
PH UR STRIP: 7 [PH] (ref 5–7)
RBC URINE: 7 /HPF
SP GR UR STRIP: 1.02 (ref 1–1.03)
SQUAMOUS EPITHELIAL: 23 /HPF
TRICHOMONAS, WET PREP: ABNORMAL
UROBILINOGEN UR STRIP-MCNC: NORMAL MG/DL
WBC URINE: 24 /HPF
WBC'S/HIGH POWER FIELD, WET PREP: ABNORMAL
YEAST, WET PREP: PRESENT

## 2024-03-26 PROCEDURE — 96361 HYDRATE IV INFUSION ADD-ON: CPT

## 2024-03-26 PROCEDURE — 59025 FETAL NON-STRESS TEST: CPT

## 2024-03-26 PROCEDURE — 999N000105 HC STATISTIC NO DOCUMENTATION TO SUPPORT CHARGE

## 2024-03-26 PROCEDURE — 59025 FETAL NON-STRESS TEST: CPT | Mod: 26 | Performed by: OBSTETRICS & GYNECOLOGY

## 2024-03-26 PROCEDURE — 250N000013 HC RX MED GY IP 250 OP 250 PS 637: Performed by: FAMILY MEDICINE

## 2024-03-26 PROCEDURE — 250N000011 HC RX IP 250 OP 636: Performed by: FAMILY MEDICINE

## 2024-03-26 PROCEDURE — 96372 THER/PROPH/DIAG INJ SC/IM: CPT | Mod: XS | Performed by: FAMILY MEDICINE

## 2024-03-26 PROCEDURE — 87086 URINE CULTURE/COLONY COUNT: CPT | Performed by: FAMILY MEDICINE

## 2024-03-26 PROCEDURE — G0463 HOSPITAL OUTPT CLINIC VISIT: HCPCS | Mod: 25

## 2024-03-26 PROCEDURE — 99213 OFFICE O/P EST LOW 20 MIN: CPT | Mod: 25 | Performed by: OBSTETRICS & GYNECOLOGY

## 2024-03-26 PROCEDURE — 96360 HYDRATION IV INFUSION INIT: CPT

## 2024-03-26 PROCEDURE — 87653 STREP B DNA AMP PROBE: CPT | Performed by: FAMILY MEDICINE

## 2024-03-26 PROCEDURE — 258N000003 HC RX IP 258 OP 636: Performed by: FAMILY MEDICINE

## 2024-03-26 PROCEDURE — 87210 SMEAR WET MOUNT SALINE/INK: CPT | Performed by: FAMILY MEDICINE

## 2024-03-26 PROCEDURE — 96374 THER/PROPH/DIAG INJ IV PUSH: CPT

## 2024-03-26 PROCEDURE — 81001 URINALYSIS AUTO W/SCOPE: CPT | Performed by: FAMILY MEDICINE

## 2024-03-26 RX ORDER — HYDROXYZINE HYDROCHLORIDE 50 MG/1
50 TABLET, FILM COATED ORAL ONCE
Status: COMPLETED | OUTPATIENT
Start: 2024-03-26 | End: 2024-03-26

## 2024-03-26 RX ORDER — CLINDAMYCIN HCL 300 MG
300 CAPSULE ORAL 3 TIMES DAILY
Qty: 21 CAPSULE | Refills: 0 | OUTPATIENT
Start: 2024-03-26 | End: 2024-08-08

## 2024-03-26 RX ORDER — FLUCONAZOLE 150 MG/1
150 TABLET ORAL ONCE
Status: CANCELLED | OUTPATIENT
Start: 2024-03-26

## 2024-03-26 RX ORDER — FLUCONAZOLE 150 MG/1
150 TABLET ORAL ONCE
Status: COMPLETED | OUTPATIENT
Start: 2024-03-26 | End: 2024-03-26

## 2024-03-26 RX ORDER — LIDOCAINE 40 MG/G
CREAM TOPICAL
Status: DISCONTINUED | OUTPATIENT
Start: 2024-03-26 | End: 2024-03-26 | Stop reason: HOSPADM

## 2024-03-26 RX ORDER — CEFTRIAXONE 1 G/1
1 INJECTION, POWDER, FOR SOLUTION INTRAMUSCULAR; INTRAVENOUS ONCE
Status: COMPLETED | OUTPATIENT
Start: 2024-03-26 | End: 2024-03-26

## 2024-03-26 RX ORDER — NITROFURANTOIN 25; 75 MG/1; MG/1
100 CAPSULE ORAL 2 TIMES DAILY
Qty: 14 CAPSULE | Refills: 0 | Status: SHIPPED | OUTPATIENT
Start: 2024-03-26 | End: 2024-04-02

## 2024-03-26 RX ORDER — BETAMETHASONE SODIUM PHOSPHATE AND BETAMETHASONE ACETATE 3; 3 MG/ML; MG/ML
12 INJECTION, SUSPENSION INTRA-ARTICULAR; INTRALESIONAL; INTRAMUSCULAR; SOFT TISSUE DAILY
Status: DISCONTINUED | OUTPATIENT
Start: 2024-03-26 | End: 2024-03-26 | Stop reason: HOSPADM

## 2024-03-26 RX ORDER — CLINDAMYCIN HCL 300 MG
300 CAPSULE ORAL 3 TIMES DAILY
Status: DISCONTINUED | OUTPATIENT
Start: 2024-03-26 | End: 2024-03-26 | Stop reason: HOSPADM

## 2024-03-26 RX ORDER — SODIUM CHLORIDE 9 MG/ML
INJECTION, SOLUTION INTRAVENOUS CONTINUOUS
Status: DISCONTINUED | OUTPATIENT
Start: 2024-03-26 | End: 2024-03-26 | Stop reason: HOSPADM

## 2024-03-26 RX ORDER — CLINDAMYCIN HCL 300 MG
300 CAPSULE ORAL 3 TIMES DAILY
Qty: 20 CAPSULE | Refills: 0 | Status: ON HOLD | OUTPATIENT
Start: 2024-03-26 | End: 2024-04-14

## 2024-03-26 RX ADMIN — SODIUM CHLORIDE: 9 INJECTION, SOLUTION INTRAVENOUS at 07:02

## 2024-03-26 RX ADMIN — BETAMETHASONE SODIUM PHOSPHATE AND BETAMETHASONE ACETATE 12 MG: 3; 3 INJECTION, SUSPENSION INTRA-ARTICULAR; INTRALESIONAL; INTRAMUSCULAR at 06:52

## 2024-03-26 RX ADMIN — HYDROXYZINE HYDROCHLORIDE 50 MG: 50 TABLET, FILM COATED ORAL at 06:58

## 2024-03-26 RX ADMIN — CEFTRIAXONE 1 G: 1 INJECTION, POWDER, FOR SOLUTION INTRAMUSCULAR; INTRAVENOUS at 05:41

## 2024-03-26 RX ADMIN — CLINDAMYCIN HYDROCHLORIDE 300 MG: 300 CAPSULE ORAL at 07:38

## 2024-03-26 RX ADMIN — FLUCONAZOLE 150 MG: 150 TABLET ORAL at 05:43

## 2024-03-26 RX ADMIN — SODIUM CHLORIDE 500 ML: 9 INJECTION, SOLUTION INTRAVENOUS at 05:27

## 2024-03-26 ASSESSMENT — ACTIVITIES OF DAILY LIVING (ADL)
ADLS_ACUITY_SCORE: 22
ADLS_ACUITY_SCORE: 23
ADLS_ACUITY_SCORE: 22

## 2024-03-26 NOTE — H&P
Observation note:    Presented with contractions, see nursing notes.  Noted contractions overnight, back pain. On evaluation by RN and in consultation with the on call MD, was found to have possible bladder infection and bacterial vaginosis.    Due to possible cervical change over 1-2 hours of monitoring, she was observed for several hours.    BP 97/52   Temp 98.2  F (36.8  C) (Oral)   Resp 16   LMP 07/03/2023 (Approximate)     General: appears well. Sitting up and eating.  Abdomen: nt.    FHTs baseline normal, reactive with accelerations and no decelerations noted.   Earlier, there were a few variable decelerations but nothing concerning.    After a period of observation, she rested and felt better.  Contractions were less intense and spaced.   No further cervical change.    Discharge to home with instructions of what to watch for and return for.  Call at any time for new symptoms.  Follow up on Friday in clinic as scheduled if doing fine otherwise.    Daylin Alarcon MD  [facility] Obstetrics and Gynecology

## 2024-03-26 NOTE — PROVIDER NOTIFICATION
03/26/24 0900   Provider Notification   Provider Name/Title Dr Alarcon   Method of Notification In Department   Request Evaluate in Person   Notification Reason Decels;Uterine Activity;Pain;Status Update     Pt more comfortable and able to sleep. Contractions spacing out. Reviewed FHR tracing. Late decel x 2, variable decel x1. Moderate variability and accels present.  Will let pt rest and come back later this morning to round.

## 2024-03-26 NOTE — PROVIDER NOTIFICATION
03/26/24 0408   Provider Notification   Provider Name/Title Dr. Lanier   Method of Notification Phone   Request Evaluate - Remote   Notification Reason Patient Arrived     Dr. Lanier notified of Pt. Arrival. Pt. Reporting back to back contractions starting at 0100 and increased urinary frequency over the last few days with rectal pressure and back pain. SVE checked and 3/50%/-3 and intact. Thick discharge noted with exam, urine collected and appears cloudy. Currently jaylan every 2-9 minutes, palpates mild. FHR with moderate variability and accels, questionable decel noted with broken tracing.    TORB to send UA and Wet prep, recheck SVE 1 hour after first exam and notify provider with results.

## 2024-03-26 NOTE — DISCHARGE INSTRUCTIONS
Follow up at [facility] clinic on 3/27/24 at 8:30 am for 2nd betamethasone shot      Learning About When to Call Your Doctor During Pregnancy (After 20 Weeks)  Overview  It's common to have concerns about what might be a problem when you're pregnant. Most pregnancies don't have any serious problems. But it's still important to know when to call your doctor if you have certain symptoms or signs of labor.  These are general suggestions. Your doctor may give you some more information about when to call.  When to call your doctor (after 20 weeks)  Call 911  anytime you think you may need emergency care. For example, call if:  You have severe vaginal bleeding. This means you are soaking through a pad each hour for 2 or more hours.  You have sudden, severe pain in your belly.  You have chest pain, are short of breath, or cough up blood.  You passed out (lost consciousness).  You have a seizure.  You see or feel the umbilical cord.  You think you are about to deliver your baby and can't make it safely to the hospital or birthing center.  Call your doctor now or seek immediate medical care if:  You have vaginal bleeding.  You have belly pain.  You have a fever.  You are dizzy or lightheaded, or you feel like you may faint.  You have signs of a blood clot in your leg (called a deep vein thrombosis), such as:  Pain in the calf, back of the knee, thigh, or groin.  Swelling in your leg or groin.  A color change on the leg or groin. The skin may be reddish or purplish, depending on your usual skin color.  You have symptoms of preeclampsia, such as:  Sudden swelling of your face, hands, or feet.  New vision problems (such as dimness, blurring, or seeing spots).  A severe headache.  You have a sudden release of fluid from your vagina. (You think your water broke.)  You've been having regular contractions for an hour. This means that you've had at least 6 contractions within 1 hour, even after you change your position and  "drink fluids.  You notice that your baby has stopped moving or is moving less than normal.  You have signs of heart failure, such as:  New or increased shortness of breath.  New or worse swelling in your legs, ankles, or feet.  Sudden weight gain, such as more than 2 to 3 pounds in a day or 5 pounds in a week.  Feeling so tired or weak that you cannot do your usual activities.  You have symptoms of a urinary tract infection. These may include:  Pain or burning when you urinate.  A frequent need to urinate without being able to pass much urine.  Pain in the flank, which is just below the rib cage and above the waist on either side of the back.  Blood in your urine.  Watch closely for changes in your health, and be sure to contact your doctor if:  You have vaginal discharge that smells bad.  You feel sad, anxious, or hopeless for more than a few days.  You have skin changes, such as a rash, itching, or a yellow color to your skin.  You have other concerns about your pregnancy.  If you have labor signs at 37 weeks or more  If you have signs of labor at 37 weeks or more, your doctor may tell you to call when your labor becomes more active. Symptoms of active labor include:  Contractions that are regular.  Contractions that are less than 5 minutes apart.  Contractions that are hard to talk through.  Follow-up care is a key part of your treatment and safety. Be sure to make and go to all appointments, and call your doctor if you are having problems. It's also a good idea to know your test results and keep a list of the medicines you take.  Where can you learn more?  Go to https://www.[employer].net/patiented  Enter N531 in the search box to learn more about \"Learning About When to Call Your Doctor During Pregnancy (After 20 Weeks).\"  Current as of: July 10, 2023               Content Version: 14.0    7949-6692 [employer].   Care instructions adapted under license by your healthcare professional. If you have " questions about a medical condition or this instruction, always ask your healthcare professional. [employer] disclaims any warranty or liability for your use of this information.

## 2024-03-26 NOTE — DISCHARGE SUMMARY
See admission note.  Was admitted for observation due to contractions, also bacterial vaginosis and UTI.  Contractions have improved, irregular and mild now.  No further change in her cervix.  Discussed discharge to home, follow up Friday in clinic.  Prescription for Macrobid for presumed UTI, clindamycin for bacterial vaginosis.    At the time of discharge, category 1 tracing with mild, irregular contractions in decreasing frequency. Reactive NST.    Reviewed the nature of emerging conditions and that new or severe symptoms should prompt reevaluation by phone or in person despite normal results today.    Daylin Alarcon MD  [facility] Obstetrics and Gynecology

## 2024-03-26 NOTE — PROGRESS NOTES
Data: [facility] presented to [facility]: 3/26/2024  3:34 AM.  Reason for maternal/fetal assessment is uterine contractions. Patient reports contractions back to back since 0100, along with increased urinary frequency over the last few days.  Patient is a .  Prenatal record reviewed. Pregnancy has been uncomplicated..  Gestational Age 35w0d. VSS. Fetal movement active. Patient denies leaking of vaginal fluid/rupture of membranes, vaginal bleeding, abdominal pain, nausea, vomiting, headache, visual disturbances, epigastric or URQ pain, significant edema. Support person is present.   Action: Verbal consent for EFM. Triage assessment completed. Bill of rights reviewed.  Response: Patient verbalized agreement with plan. Will contact Dr Neha Lanier with update and for further orders.

## 2024-03-26 NOTE — PROVIDER NOTIFICATION
03/26/24 0623   Provider Notification   Provider Name/Title Dr. Lanier   Method of Notification Phone   Request Evaluate - Remote   Notification Reason Pain;SVE     Dr. Lanier updated on increased reported pain, SVE rechecked and 3.5/50%/-3. Has received IV ABX and almost completed IV fluid bolus. Inquiring about pain management.    TORB to administer one time dose 50mg Vistaril and resume NS at 125/hr. Keep for observation and administer BMZ. Can start Clindamycin TID here.

## 2024-03-26 NOTE — TELEPHONE ENCOUNTER
OB Triage Call      Is patient's OB/Midwife with the [facility] or [facility]? [facility]- Proceed with triage     Reason for call: Patient calling. She believes she is having contractions, which started around 1 AM. She has lots of pressure in her perineum. She is having frequent urination with little output. Her abdomen is hard and it also feels like she needs to have a bowel movement. She's been walking. No urge to push yet. She is having lower back pain. She is unsure how far apart her contractions are. Contractions 3.5 minutes apart during call, lasting approximately 45 seconds. Unable to speak through them.       Assessment: Patient actively laboring, 35 weeks gestation    Plan: vaginal delivery    Patient plans to deliver at [facility]     Patient's primary OB Provider is SRINIVASA Menon.      Per protocol recommendations Patient to be evaluated in L&D. Patient's primary OB is Rad Physician.  Labor and delivery at [facility] ([phone]) notified of patient's pending arrival.  Report given to GEOVANNY Mejia.      Is patient's delivering hospital on divert? No      35w0d    Estimated Date of Delivery: 2024        OB History    Para Term  AB Living   3 2 2 0 0 2   SAB IAB Ectopic Multiple Live Births   0 0 0 0 2      # Outcome Date GA Lbr Michael/2nd Weight Sex Delivery Anes PTL Lv   3 Current            2 Term 01/15/22 39w4d 01:30 / 00:11 3.22 kg (7 lb 1.6 oz) M Vag-Spont None N [place]      Name: Jordi      Apgar1: 9  Apgar5: 9   1 Term 10/31/15 38w0d 00:56 / 00:19 2.489 kg (5 lb 7.8 oz) F Vag-Spont None N [place]      Name: Alis      Apgar1: 9  Apgar5: 9       Lab Results   Component Value Date    GBS  10/16/2015     Negative  No GBS DNA detected, presumed negative for GBS or number of bacteria may be   below the limit of detection of the assay.   Assay performed on incubated broth culture of specimen using [employer] real-time   PCR.            Kristen Sosa RN 24 2:40 AM  [facility]  "Advisor    Additional Information   Negative: Passed out (i.e., lost consciousness, collapsed and was not responding)   Negative: Shock suspected (e.g., cold/pale/clammy skin, too weak to stand, low BP, rapid pulse)   Negative: Difficult to awaken or acting confused (e.g., disoriented, slurred speech)   Negative: [1] SEVERE abdominal pain (e.g., excruciating) AND [2] constant AND [3] present > 1 hour   Negative: SEVERE bleeding (e.g., continuous red blood from vagina, or large blood clots)   Negative: Umbilical cord hanging out of the vagina (shiny, white, curled appearance, \"like telephone cord\")   Negative: Uncontrollable urge to push (i.e., feels like baby is coming out now)   Negative: Can see baby   Negative: Sounds like a life-threatening emergency to the triager   Negative: MODERATE-SEVERE abdominal pain   Negative: [1] Contractions < 10 minutes apart AND [2] persist > 1 hour  (i.e., 6 or more contractions an hour)    Protocols used: Pregnancy - Labor - [id number]    "

## 2024-03-26 NOTE — PROVIDER NOTIFICATION
03/26/24 1121   Provider Notification   Provider Name/Title Dr Alarcon   Method of Notification Phone   Request Evaluate - Remote   Notification Reason Decels;Uterine Activity;Pain;Status Update;SVE     Pt more uncomfortable, breathing through contractions. SVE done, unchanged.  with mod variability, accels present. Audible decel at 1012 while pt getting back into bed after voiding.

## 2024-03-26 NOTE — PROVIDER NOTIFICATION
03/26/24 0459   Provider Notification   Provider Name/Title Dr. Lanier   Method of Notification Phone   Request Evaluate - Remote   Notification Reason Lab/Diagnostic Study;SVE     Dr. Lanier updated on UA and Wet Prep. UA reflexed to culture, Wet prep positive for clue cells, yeast and 2+ WBCs. SVE unchanged. Pt. Geovani every 1.5-10 minutes, palpates mild.    TORB to administer 500cc NS bolus and 1g IV Rocephin. Give one dose 150mg Diflucan and second dose in 7 days. And Clindamycin 300mg TID x7 days.

## 2024-03-26 NOTE — PROVIDER NOTIFICATION
03/26/24 1245   Provider Notification   Provider Name/Title Dr Alarcon   Method of Notification At Bedside   Request Evaluate in Person   Notification Reason Status Update     Dr Alarcon at bedside. Reviewed FHR tracing UC pattern and pt discomfort. Pt states she is feeling better overall. Still has some contractions that she would rate 4/10. Able to sleep and eat lunch. Order to discharge to home. Will follow up in clinic tomorrow morning at 0830 for second betamethasone. Also has prenatal appt on Friday with Dr CE Tolbert.  Rx sent to pharmacy for Macrobid and clindamycin.  Reviewed discharged instructions and fetal kick count instructions. Answered questions.

## 2024-03-27 ENCOUNTER — PRENATAL OFFICE VISIT (OUTPATIENT)
Dept: OBGYN | Facility: CLINIC | Age: 29
End: 2024-03-27

## 2024-03-27 VITALS — BODY MASS INDEX: 23.83 KG/M2 | DIASTOLIC BLOOD PRESSURE: 54 MMHG | WEIGHT: 122 LBS | SYSTOLIC BLOOD PRESSURE: 104 MMHG

## 2024-03-27 DIAGNOSIS — Z34.80 SUPERVISION OF OTHER NORMAL PREGNANCY, ANTEPARTUM: Primary | ICD-10-CM

## 2024-03-27 DIAGNOSIS — B95.1 GROUP B STREPTOCOCCUS URINARY TRACT INFECTION AFFECTING PREGNANCY, ANTEPARTUM: Primary | ICD-10-CM

## 2024-03-27 DIAGNOSIS — O23.40 GROUP B STREPTOCOCCUS URINARY TRACT INFECTION AFFECTING PREGNANCY, ANTEPARTUM: Primary | ICD-10-CM

## 2024-03-27 LAB
BACTERIA UR CULT: ABNORMAL
BACTERIA UR CULT: ABNORMAL
GP B STREP DNA SPEC QL NAA+PROBE: POSITIVE

## 2024-03-27 PROCEDURE — 96372 THER/PROPH/DIAG INJ SC/IM: CPT | Performed by: OBSTETRICS & GYNECOLOGY

## 2024-03-27 PROCEDURE — 99207 PR PRENATAL VISIT: CPT | Performed by: OBSTETRICS & GYNECOLOGY

## 2024-03-27 RX ORDER — PENICILLIN V POTASSIUM 500 MG/1
500 TABLET, FILM COATED ORAL 2 TIMES DAILY
Qty: 14 TABLET | Refills: 0 | Status: SHIPPED | OUTPATIENT
Start: 2024-03-27 | End: 2024-04-03

## 2024-03-27 RX ORDER — BETAMETHASONE SODIUM PHOSPHATE AND BETAMETHASONE ACETATE 3; 3 MG/ML; MG/ML
12 INJECTION, SUSPENSION INTRA-ARTICULAR; INTRALESIONAL; INTRAMUSCULAR; SOFT TISSUE ONCE
Status: COMPLETED | OUTPATIENT
Start: 2024-03-27 | End: 2024-03-27

## 2024-03-27 RX ADMIN — BETAMETHASONE SODIUM PHOSPHATE AND BETAMETHASONE ACETATE 12 MG: 3; 3 INJECTION, SUSPENSION INTRA-ARTICULAR; INTRALESIONAL; INTRAMUSCULAR; SOFT TISSUE at 10:34

## 2024-03-27 NOTE — NURSING NOTE
Chief Complaint   Patient presents with    Prenatal Care   35w1d    initial /54   Wt 55.3 kg (122 lb)   LMP 07/03/2023 (Approximate)   BMI 23.83 kg/m   Estimated body mass index is 23.83 kg/m  as calculated from the following:    Height as of 11/13/23: 1.524 m (5').    Weight as of this encounter: 55.3 kg (122 lb).  BP completed using cuff size regular.  Patricia iLao CMA

## 2024-03-27 NOTE — NURSING NOTE
Clinic Administered Medication Documentation        Patient was given Betamethasone. Prior to medication administration, verified patient's identity using patient s name and date of birth. Please see MAR and medication order for additional information. Patient instructed to remain in clinic for 15 minutes and report any adverse reaction to staff immediately\.    Vial/Syringe: Multi dose vial  Patricia Liao [facility]

## 2024-03-27 NOTE — PROGRESS NOTES
29yo  at 35w1d here for 2nd betamethasone injection.  Was discharged yesterday from L&D with  ctx.  Cervix unchanged from discharge.  RTC 2 days as scheduled.

## 2024-03-29 ENCOUNTER — PRENATAL OFFICE VISIT (OUTPATIENT)
Dept: OBGYN | Facility: CLINIC | Age: 29
End: 2024-03-29
Payer: MEDICAID

## 2024-03-29 VITALS — WEIGHT: 121 LBS | BODY MASS INDEX: 23.63 KG/M2 | DIASTOLIC BLOOD PRESSURE: 56 MMHG | SYSTOLIC BLOOD PRESSURE: 114 MMHG

## 2024-03-29 DIAGNOSIS — Z34.80 SUPERVISION OF OTHER NORMAL PREGNANCY, ANTEPARTUM: Primary | ICD-10-CM

## 2024-03-29 PROCEDURE — 99207 PR PRENATAL VISIT: CPT | Performed by: OBSTETRICS & GYNECOLOGY

## 2024-03-29 PROCEDURE — 59425 ANTEPARTUM CARE ONLY: CPT | Performed by: OBSTETRICS & GYNECOLOGY

## 2024-03-29 NOTE — NURSING NOTE
Chief Complaint   Patient presents with    Prenatal Care       Initial /56   Wt 54.9 kg (121 lb)   LMP 2023 (Approximate)   BMI 23.63 kg/m   Estimated body mass index is 23.63 kg/m  as calculated from the following:    Height as of 23: 1.524 m (5').    Weight as of this encounter: 54.9 kg (121 lb).  BP completed using cuff size: regular    Questioned patient about current smoking habits.  Pt. has never smoked.          Karen Palencia LPN on 3/29/2024 at 2:03 PM

## 2024-03-29 NOTE — PROGRESS NOTES
Felling well, no complaints of contractions  Has been taking her Rx for the bladder indfection  No LOF, cramps, discharge  RTC next week for GBS

## 2024-03-29 NOTE — PROGRESS NOTES
In to the ER with a UTI and received steroids.  Cx 3-4 cm  No contractions now  On Macrobid  Baby active  RTC one week  GBS next visit

## 2024-04-01 ENCOUNTER — PRENATAL OFFICE VISIT (OUTPATIENT)
Dept: OBGYN | Facility: CLINIC | Age: 29
End: 2024-04-01
Payer: COMMERCIAL

## 2024-04-01 ENCOUNTER — MYC MEDICAL ADVICE (OUTPATIENT)
Dept: OBGYN | Facility: CLINIC | Age: 29
End: 2024-04-01

## 2024-04-01 VITALS — DIASTOLIC BLOOD PRESSURE: 50 MMHG | BODY MASS INDEX: 23.63 KG/M2 | WEIGHT: 121 LBS | SYSTOLIC BLOOD PRESSURE: 108 MMHG

## 2024-04-01 DIAGNOSIS — O99.619: ICD-10-CM

## 2024-04-01 DIAGNOSIS — O23.599 BACTERIAL VAGINOSIS IN PREGNANCY: ICD-10-CM

## 2024-04-01 DIAGNOSIS — O99.820 GBS (GROUP B STREPTOCOCCUS CARRIER), +RV CULTURE, CURRENTLY PREGNANT: ICD-10-CM

## 2024-04-01 DIAGNOSIS — B96.89 BACTERIAL VAGINOSIS IN PREGNANCY: ICD-10-CM

## 2024-04-01 DIAGNOSIS — N89.8 VAGINAL DISCHARGE DURING PREGNANCY IN THIRD TRIMESTER: Primary | ICD-10-CM

## 2024-04-01 DIAGNOSIS — O26.893 VAGINAL DISCHARGE DURING PREGNANCY IN THIRD TRIMESTER: Primary | ICD-10-CM

## 2024-04-01 DIAGNOSIS — K35.80: ICD-10-CM

## 2024-04-01 PROBLEM — Z36.89 ENCOUNTER FOR TRIAGE IN PREGNANT PATIENT: Status: RESOLVED | Noted: 2023-12-15 | Resolved: 2024-04-01

## 2024-04-01 LAB
ALBUMIN UR-MCNC: NEGATIVE MG/DL
AMORPH CRY #/AREA URNS HPF: ABNORMAL /HPF
APPEARANCE UR: ABNORMAL
BILIRUB UR QL STRIP: NEGATIVE
COLOR UR AUTO: YELLOW
CRYSTALS AMN MICRO: NORMAL
GLUCOSE UR STRIP-MCNC: NEGATIVE MG/DL
HGB UR QL STRIP: ABNORMAL
KETONES UR STRIP-MCNC: NEGATIVE MG/DL
LEUKOCYTE ESTERASE UR QL STRIP: ABNORMAL
NITRATE UR QL: NEGATIVE
PH UR STRIP: 7 [PH] (ref 5–7)
RBC #/AREA URNS AUTO: ABNORMAL /HPF
SP GR UR STRIP: 1.01 (ref 1–1.03)
SQUAMOUS #/AREA URNS AUTO: ABNORMAL /LPF
UROBILINOGEN UR STRIP-ACNC: 0.2 E.U./DL
WBC #/AREA URNS AUTO: ABNORMAL /HPF

## 2024-04-01 PROCEDURE — 81001 URINALYSIS AUTO W/SCOPE: CPT | Performed by: OBSTETRICS & GYNECOLOGY

## 2024-04-01 PROCEDURE — 99207 PR COMPLICATED OB VISIT: CPT | Performed by: OBSTETRICS & GYNECOLOGY

## 2024-04-01 PROCEDURE — 87086 URINE CULTURE/COLONY COUNT: CPT | Performed by: OBSTETRICS & GYNECOLOGY

## 2024-04-01 NOTE — PROGRESS NOTES
Pt here for possible increase in vag discharge yesterday around 2 PM, and again at 6 PM, mucousy. This AM noted some increase bladder pressure with baby moving and ?leaking small amount, not enough to saturate a pad. Of note, Pt has been undergoing Rx Clinda 300 TID for BV Dx'd 6 days ago, and also Diflucan for yeast that time, and Penicillin for GBS Bacturia. Fetus active, occas UCs, no VB.  SSE- Vagina Neg Pool, Fern sent. Normal white physiologic discharge present. Assuming Fern Neg, Pt to follow-up 4 days for reg OB visit. Fetal movement counts BID,  labor/premature rupture of membranes precautions reviewed.    Encounter Diagnoses   Name Primary?    Vaginal discharge during pregnancy in third trimester Yes    Pregnancy complicated by acute appendicitis     GBS (group B Streptococcus carrier), +RV culture, currently pregnant     Bacterial vaginosis in pregnancy        Risk factors listed above are stable and being addressed as noted.    Houston Logan MD  [facility] [place]

## 2024-04-01 NOTE — TELEPHONE ENCOUNTER
I spoke with patient directly. She states she does not have a ride to the clinic, and it waiting for a family member to pick her up. She will not be here until after 12:00 noon. She states she is hoping to be in clinic before 1:00.    Cornelio Combs CMA

## 2024-04-01 NOTE — NURSING NOTE
Chief Complaint   Patient presents with    Prenatal Care     Vaginal pressure and discharge        Initial /50 (BP Location: Right arm, Patient Position: Sitting, Cuff Size: Adult Regular)   Wt 54.9 kg (121 lb)   LMP 2023 (Approximate)   BMI 23.63 kg/m   Estimated body mass index is 23.63 kg/m  as calculated from the following:    Height as of 23: 1.524 m (5').    Weight as of this encounter: 54.9 kg (121 lb).  BP completed using cuff size: regular    Questioned patient about current smoking habits.  Pt. has never smoked.          The following HM Due: NONE    35w6d  Cornelio Combs, EZEQUIEL

## 2024-04-01 NOTE — TELEPHONE ENCOUNTER
35w6d    Spoke to pt  Yesterday pt noticed inc discharge when she went to the bathroom, thick mucus- like discharge    Last night she noticed some trickling fluid last night when the baby moved. No trickling upon waking up today. Also, random cxts, nothing regular, far apart.    Advised to monitor cxts and call if they become rhythmic  Rest on left side for at least 30 min with dry underwear. If any leaking upon standing let us know.      Shanna CARRASCO RN BSN

## 2024-04-01 NOTE — TELEPHONE ENCOUNTER
Spoke with Dr Logan.    Pt will come into the clinic to get checked PROM, she will be to the clinic in about 30 min.    Shanna CARRASCO RN BSN

## 2024-04-03 LAB — BACTERIA UR CULT: NO GROWTH

## 2024-04-05 ENCOUNTER — PRENATAL OFFICE VISIT (OUTPATIENT)
Dept: OBGYN | Facility: CLINIC | Age: 29
End: 2024-04-05
Payer: COMMERCIAL

## 2024-04-05 VITALS — DIASTOLIC BLOOD PRESSURE: 56 MMHG | SYSTOLIC BLOOD PRESSURE: 118 MMHG | BODY MASS INDEX: 23.24 KG/M2 | WEIGHT: 119 LBS

## 2024-04-05 DIAGNOSIS — Z34.80 SUPERVISION OF OTHER NORMAL PREGNANCY, ANTEPARTUM: Primary | ICD-10-CM

## 2024-04-05 PROCEDURE — 99212 OFFICE O/P EST SF 10 MIN: CPT | Performed by: OBSTETRICS & GYNECOLOGY

## 2024-04-05 RX ORDER — PENICILLIN V POTASSIUM 250 MG/1
250 TABLET, FILM COATED ORAL 4 TIMES DAILY
Status: ON HOLD | COMMUNITY
End: 2024-04-14

## 2024-04-05 NOTE — PROGRESS NOTES
No new issues  Done with antibiotics for UTI  Baby active, no contractions  Vertex by US  Knows signs of labor to call and be seen  RTC one week

## 2024-04-05 NOTE — NURSING NOTE
Chief Complaint   Patient presents with    Prenatal Care       Initial /56   Wt 54 kg (119 lb)   LMP 2023 (Approximate)   BMI 23.24 kg/m   Estimated body mass index is 23.24 kg/m  as calculated from the following:    Height as of 23: 1.524 m (5').    Weight as of this encounter: 54 kg (119 lb).  BP completed using cuff size: regular    Questioned patient about current smoking habits.  Pt. has never smoked.          Karen Palencia LPN on 2024 at 2:18 PM

## 2024-04-12 ENCOUNTER — PRENATAL OFFICE VISIT (OUTPATIENT)
Dept: OBGYN | Facility: CLINIC | Age: 29
End: 2024-04-12
Payer: COMMERCIAL

## 2024-04-12 ENCOUNTER — HOSPITAL ENCOUNTER (INPATIENT)
Facility: CLINIC | Age: 29
LOS: 2 days | Discharge: HOME HEALTH | End: 2024-04-14
Attending: OBSTETRICS & GYNECOLOGY | Admitting: OBSTETRICS & GYNECOLOGY
Payer: COMMERCIAL

## 2024-04-12 VITALS — WEIGHT: 122 LBS | BODY MASS INDEX: 23.83 KG/M2

## 2024-04-12 DIAGNOSIS — Z34.80 SUPERVISION OF OTHER NORMAL PREGNANCY, ANTEPARTUM: Primary | ICD-10-CM

## 2024-04-12 PROBLEM — Z36.89 ENCOUNTER FOR TRIAGE IN PREGNANT PATIENT: Status: ACTIVE | Noted: 2024-04-12

## 2024-04-12 LAB
ABO/RH(D): NORMAL
ANTIBODY SCREEN: NEGATIVE
HGB BLD-MCNC: 9.6 G/DL (ref 11.7–15.7)
SPECIMEN EXPIRATION DATE: NORMAL

## 2024-04-12 PROCEDURE — 85018 HEMOGLOBIN: CPT | Performed by: OBSTETRICS & GYNECOLOGY

## 2024-04-12 PROCEDURE — 258N000003 HC RX IP 258 OP 636: Performed by: OBSTETRICS & GYNECOLOGY

## 2024-04-12 PROCEDURE — G0463 HOSPITAL OUTPT CLINIC VISIT: HCPCS

## 2024-04-12 PROCEDURE — 86780 TREPONEMA PALLIDUM: CPT | Performed by: OBSTETRICS & GYNECOLOGY

## 2024-04-12 PROCEDURE — 120N000001 HC R&B MED SURG/OB

## 2024-04-12 PROCEDURE — 250N000011 HC RX IP 250 OP 636: Mod: JZ | Performed by: OBSTETRICS & GYNECOLOGY

## 2024-04-12 PROCEDURE — 99212 OFFICE O/P EST SF 10 MIN: CPT | Performed by: OBSTETRICS & GYNECOLOGY

## 2024-04-12 PROCEDURE — 86900 BLOOD TYPING SEROLOGIC ABO: CPT | Performed by: OBSTETRICS & GYNECOLOGY

## 2024-04-12 RX ORDER — OXYTOCIN 10 [USP'U]/ML
10 INJECTION, SOLUTION INTRAMUSCULAR; INTRAVENOUS
Status: DISCONTINUED | OUTPATIENT
Start: 2024-04-12 | End: 2024-04-13

## 2024-04-12 RX ORDER — SODIUM CHLORIDE, SODIUM LACTATE, POTASSIUM CHLORIDE, CALCIUM CHLORIDE 600; 310; 30; 20 MG/100ML; MG/100ML; MG/100ML; MG/100ML
INJECTION, SOLUTION INTRAVENOUS CONTINUOUS
Status: DISCONTINUED | OUTPATIENT
Start: 2024-04-12 | End: 2024-04-13 | Stop reason: HOSPADM

## 2024-04-12 RX ORDER — TRANEXAMIC ACID 10 MG/ML
1 INJECTION, SOLUTION INTRAVENOUS EVERY 30 MIN PRN
Status: DISCONTINUED | OUTPATIENT
Start: 2024-04-12 | End: 2024-04-13 | Stop reason: HOSPADM

## 2024-04-12 RX ORDER — PENICILLIN G POTASSIUM 5000000 [IU]/1
5 INJECTION, POWDER, FOR SOLUTION INTRAMUSCULAR; INTRAVENOUS ONCE
Status: COMPLETED | OUTPATIENT
Start: 2024-04-12 | End: 2024-04-12

## 2024-04-12 RX ORDER — ONDANSETRON 4 MG/1
4 TABLET, ORALLY DISINTEGRATING ORAL EVERY 6 HOURS PRN
Status: DISCONTINUED | OUTPATIENT
Start: 2024-04-12 | End: 2024-04-13 | Stop reason: HOSPADM

## 2024-04-12 RX ORDER — METHYLERGONOVINE MALEATE 0.2 MG/ML
200 INJECTION INTRAVENOUS
Status: DISCONTINUED | OUTPATIENT
Start: 2024-04-12 | End: 2024-04-13 | Stop reason: HOSPADM

## 2024-04-12 RX ORDER — FENTANYL CITRATE 50 UG/ML
100 INJECTION, SOLUTION INTRAMUSCULAR; INTRAVENOUS
Status: DISCONTINUED | OUTPATIENT
Start: 2024-04-12 | End: 2024-04-13 | Stop reason: HOSPADM

## 2024-04-12 RX ORDER — NALOXONE HYDROCHLORIDE 0.4 MG/ML
0.2 INJECTION, SOLUTION INTRAMUSCULAR; INTRAVENOUS; SUBCUTANEOUS
Status: DISCONTINUED | OUTPATIENT
Start: 2024-04-12 | End: 2024-04-13 | Stop reason: HOSPADM

## 2024-04-12 RX ORDER — MISOPROSTOL 200 UG/1
400 TABLET ORAL
Status: DISCONTINUED | OUTPATIENT
Start: 2024-04-12 | End: 2024-04-13 | Stop reason: HOSPADM

## 2024-04-12 RX ORDER — OXYTOCIN/0.9 % SODIUM CHLORIDE 30/500 ML
340 PLASTIC BAG, INJECTION (ML) INTRAVENOUS CONTINUOUS PRN
Status: DISCONTINUED | OUTPATIENT
Start: 2024-04-12 | End: 2024-04-13 | Stop reason: HOSPADM

## 2024-04-12 RX ORDER — KETOROLAC TROMETHAMINE 30 MG/ML
30 INJECTION, SOLUTION INTRAMUSCULAR; INTRAVENOUS
Status: DISCONTINUED | OUTPATIENT
Start: 2024-04-12 | End: 2024-04-13

## 2024-04-12 RX ORDER — OXYTOCIN/0.9 % SODIUM CHLORIDE 30/500 ML
100-340 PLASTIC BAG, INJECTION (ML) INTRAVENOUS CONTINUOUS PRN
Status: DISCONTINUED | OUTPATIENT
Start: 2024-04-12 | End: 2024-04-13

## 2024-04-12 RX ORDER — OXYTOCIN 10 [USP'U]/ML
10 INJECTION, SOLUTION INTRAMUSCULAR; INTRAVENOUS
Status: DISCONTINUED | OUTPATIENT
Start: 2024-04-12 | End: 2024-04-13 | Stop reason: HOSPADM

## 2024-04-12 RX ORDER — MISOPROSTOL 200 UG/1
800 TABLET ORAL
Status: DISCONTINUED | OUTPATIENT
Start: 2024-04-12 | End: 2024-04-13 | Stop reason: HOSPADM

## 2024-04-12 RX ORDER — ONDANSETRON 2 MG/ML
4 INJECTION INTRAMUSCULAR; INTRAVENOUS EVERY 6 HOURS PRN
Status: DISCONTINUED | OUTPATIENT
Start: 2024-04-12 | End: 2024-04-13 | Stop reason: HOSPADM

## 2024-04-12 RX ORDER — LOPERAMIDE HCL 2 MG
4 CAPSULE ORAL
Status: DISCONTINUED | OUTPATIENT
Start: 2024-04-12 | End: 2024-04-13 | Stop reason: HOSPADM

## 2024-04-12 RX ORDER — PROCHLORPERAZINE 25 MG
25 SUPPOSITORY, RECTAL RECTAL EVERY 12 HOURS PRN
Status: DISCONTINUED | OUTPATIENT
Start: 2024-04-12 | End: 2024-04-13 | Stop reason: HOSPADM

## 2024-04-12 RX ORDER — CARBOPROST TROMETHAMINE 250 UG/ML
250 INJECTION, SOLUTION INTRAMUSCULAR
Status: DISCONTINUED | OUTPATIENT
Start: 2024-04-12 | End: 2024-04-13 | Stop reason: HOSPADM

## 2024-04-12 RX ORDER — PENICILLIN G 3000000 [IU]/50ML
3 INJECTION, SOLUTION INTRAVENOUS EVERY 4 HOURS
Status: DISCONTINUED | OUTPATIENT
Start: 2024-04-12 | End: 2024-04-13 | Stop reason: HOSPADM

## 2024-04-12 RX ORDER — PROCHLORPERAZINE MALEATE 10 MG
10 TABLET ORAL EVERY 6 HOURS PRN
Status: DISCONTINUED | OUTPATIENT
Start: 2024-04-12 | End: 2024-04-13 | Stop reason: HOSPADM

## 2024-04-12 RX ORDER — NALOXONE HYDROCHLORIDE 0.4 MG/ML
0.4 INJECTION, SOLUTION INTRAMUSCULAR; INTRAVENOUS; SUBCUTANEOUS
Status: DISCONTINUED | OUTPATIENT
Start: 2024-04-12 | End: 2024-04-13 | Stop reason: HOSPADM

## 2024-04-12 RX ORDER — LOPERAMIDE HCL 2 MG
2 CAPSULE ORAL
Status: DISCONTINUED | OUTPATIENT
Start: 2024-04-12 | End: 2024-04-13 | Stop reason: HOSPADM

## 2024-04-12 RX ORDER — IBUPROFEN 800 MG/1
800 TABLET, FILM COATED ORAL
Status: DISCONTINUED | OUTPATIENT
Start: 2024-04-12 | End: 2024-04-13

## 2024-04-12 RX ORDER — METOCLOPRAMIDE HYDROCHLORIDE 5 MG/ML
10 INJECTION INTRAMUSCULAR; INTRAVENOUS EVERY 6 HOURS PRN
Status: DISCONTINUED | OUTPATIENT
Start: 2024-04-12 | End: 2024-04-13 | Stop reason: HOSPADM

## 2024-04-12 RX ORDER — METOCLOPRAMIDE 10 MG/1
10 TABLET ORAL EVERY 6 HOURS PRN
Status: DISCONTINUED | OUTPATIENT
Start: 2024-04-12 | End: 2024-04-13 | Stop reason: HOSPADM

## 2024-04-12 RX ORDER — CITRIC ACID/SODIUM CITRATE 334-500MG
30 SOLUTION, ORAL ORAL
Status: DISCONTINUED | OUTPATIENT
Start: 2024-04-12 | End: 2024-04-13 | Stop reason: HOSPADM

## 2024-04-12 RX ADMIN — PENICILLIN G POTASSIUM 5 MILLION UNITS: 5000000 POWDER, FOR SOLUTION INTRAMUSCULAR; INTRAPLEURAL; INTRATHECAL; INTRAVENOUS at 19:46

## 2024-04-12 RX ADMIN — SODIUM CHLORIDE, POTASSIUM CHLORIDE, SODIUM LACTATE AND CALCIUM CHLORIDE: 600; 310; 30; 20 INJECTION, SOLUTION INTRAVENOUS at 21:00

## 2024-04-12 RX ADMIN — PENICILLIN G 3 MILLION UNITS: 3000000 INJECTION, SOLUTION INTRAVENOUS at 23:56

## 2024-04-12 ASSESSMENT — ACTIVITIES OF DAILY LIVING (ADL)
ADLS_ACUITY_SCORE: 37
ADLS_ACUITY_SCORE: 22

## 2024-04-12 NOTE — PROVIDER NOTIFICATION
04/12/24 3747   Comments   Nursing Comments RN bedside adjusting monitor     Writer RN bedside adjusting monitor, baby is very active. Had pt reposition onto side lying.

## 2024-04-12 NOTE — PROVIDER NOTIFICATION
04/12/24 1552   Provider Notification   Provider Name/Title Dr. Menon   Method of Notification Electronic Page   Request Evaluate - Remote   Notification Reason Patient Arrived;Status Update     [facility] web based paged Dr. Menon to call unit to discuss triage pt.

## 2024-04-12 NOTE — H&P
H&P Update 2024     No significant change in general health status based on examination of the patient, review of Nursing Admission Database and prenatal record.    28 year old  at 37w3d presented with contractions and made cervical change from 2.5 to 3.5cm.  she had a history of precipitous labor with her second baby, and was GBS pos.  EFW AGA 7#, pelvis proven to 7#.  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  Planning unmedicated delivery.    Admit for labor    Carmela Menon MD, MPH  [facility]

## 2024-04-12 NOTE — PROVIDER NOTIFICATION
04/12/24 1834   Provider Notification   Provider Name/Title Dr. Menon   Method of Notification In Department   Request Evaluate - Remote   Notification Reason SVE;Status Update     MD came to desk to check in on pt. SVE 3.5-4/75/-1, continues to have irregular contractions that palpate mild- moderate. Cervix came forward, baby moved down a station, cervix thinned, BOW felt. Reviewed FHR tracing together. MD okay with pt being off monitor for an hour, eating dinner, and using birth ball. VORB for intrapartum order set, GBS+ so plan on PCN ASAP. Will place IV and admit pt.      Problem: Hemodialysis  Goal: Dialysis: Safe, effective, and comfortable hemodialysis treatment (Hemodialysis nurse only)  Description: Pt tolerated hemodialysis well without complications. Pt had some restlessness due to generalized discomfort. BP remained stable throughout treatment. 1 unit PRBC's given with no complications. Removed 2 kg in 4 hour treatment.   Outcome: Outcome Met, Continue evaluating goal progress toward completion  Note: Tolerated treatment well.  3 kg removed over 3.5 hours.  Goal: Dialysis: Free of complications related to initiation/termination of dialysis (Hemodialysis nurse only)  Description: Unable to access venous buttonhole. Utilized 15 G 1 inch venous needle away from button hole site.   Some positional issues with arterial and venous needles at beginning of treatment. Flipped both needles which was effective. Maintained BFR of 350-400 ml/min throughout treatment.   Outcome: Outcome Met, Continue evaluating goal progress toward completion  Note:   No access complications.  400BFR maintained throughout treatment.

## 2024-04-12 NOTE — PROVIDER NOTIFICATION
04/12/24 1642   Provider Notification   Provider Name/Title Dr. Menon   Method of Notification Electronic Page;Phone   Request Evaluate - Remote   Notification Reason Status Update;SVE     MD was messaged on [employer] based paging to call unit for update when available, MD returned page.     Tracing has been WDL baseline with moderate variability, accels present and no decels. SVE 3/60/-2, pt was triaged on L&D at the end of March and was charted as dilated to 3.5cm. So presuming pt is unchanged from where she has been dilated to for a while. Pt reports with previous delivery having SROM and barely making into hosptial to deliver, she lives 10 minutes away. So writer RN discussed with pt what her preferences were to stay and be reassessed vs going home, pt would like to stay and be rechecked to be safe as it happened so fast last time and she is GBS+.     MD agreed to keep pt and reassess 1.5-2 hours after this SVE. Pt can be off the monitor and walk the halls.

## 2024-04-12 NOTE — PROVIDER NOTIFICATION
24 8024   Provider Notification   Provider Name/Title Dr. Menon   Method of Notification Phone   Request Evaluate - Remote   Notification Reason Patient Arrived;Status Update     Updated MD on pt arrival. 27yo 37w3d . Pt was sent from clinic for R/O labor, . Pt denies ROM or leaking fluid, and vaginal bleeding. Pt denies chest pain, SOB, HA, vision changes, and swelling. Prenatal history is complicated by laparoscopic appendectomy. Fetal movement present per pt.        Discussed through tracing from time monitors were applied until now: Baby was moderate variability with WDL baseline, baby then was more minimal variability with x1 late decel and x1 early decel. RN asked pt if she had eaten or been drinking water today, pt had only had some fruit and water as she came from work. RN brought pt juice and crackers, fetal HR tracing then was moderate variability with accels, no decels, and WDL baseline.     Contractions palpate mild, are not consistent and have coupling & tripling present. Pt is not uncomfortable, does have some back discomfort after some of the contractions.     MD ordered for pt's cervix to be reassessed at 1630. Will update MD with SUSHIL.
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
## 2024-04-12 NOTE — CARE PLAN
Data: Patient presented to [facility]: 2024  3:03 PM.  Reason for maternal/fetal assessment is uterine contractions. Patient was sent over from clinic, having some contractions so Dr. BHAVIN Tolbert wanted pt assessed on L&D, Pt was 2.5cm in clinic today.  Patient is a .  Prenatal record reviewed. Pregnancy  has been complicated by  has been complicated by laparoscopic appendectomy .  Gestational Age 37w3d. VSS. Fetal movement present. Patient denies leaking of vaginal fluid/rupture of membranes, vaginal bleeding, nausea, vomiting, headache, visual disturbances, epigastric or RUQ pain, significant edema. Support person is present.   Action: Verbal consent for EFM. Triage assessment completed. Bill of rights reviewed.  Response: Patient verbalized agreement with plan. Will contact Dr Carmela Menon with update and further orders.

## 2024-04-13 LAB — T PALLIDUM AB SER QL: NONREACTIVE

## 2024-04-13 PROCEDURE — 250N000013 HC RX MED GY IP 250 OP 250 PS 637: Performed by: OBSTETRICS & GYNECOLOGY

## 2024-04-13 PROCEDURE — 250N000009 HC RX 250: Performed by: OBSTETRICS & GYNECOLOGY

## 2024-04-13 PROCEDURE — 722N000001 HC LABOR CARE VAGINAL DELIVERY SINGLE

## 2024-04-13 PROCEDURE — 120N000013 HC R&B IMCU

## 2024-04-13 PROCEDURE — 59410 OBSTETRICAL CARE: CPT | Performed by: OBSTETRICS & GYNECOLOGY

## 2024-04-13 PROCEDURE — 10907ZC DRAINAGE OF AMNIOTIC FLUID, THERAPEUTIC FROM PRODUCTS OF CONCEPTION, VIA NATURAL OR ARTIFICIAL OPENING: ICD-10-PCS | Performed by: OBSTETRICS & GYNECOLOGY

## 2024-04-13 RX ORDER — OXYTOCIN/0.9 % SODIUM CHLORIDE 30/500 ML
340 PLASTIC BAG, INJECTION (ML) INTRAVENOUS CONTINUOUS PRN
Status: DISCONTINUED | OUTPATIENT
Start: 2024-04-13 | End: 2024-04-14 | Stop reason: HOSPADM

## 2024-04-13 RX ORDER — OXYTOCIN 10 [USP'U]/ML
10 INJECTION, SOLUTION INTRAMUSCULAR; INTRAVENOUS
Status: DISCONTINUED | OUTPATIENT
Start: 2024-04-13 | End: 2024-04-14 | Stop reason: HOSPADM

## 2024-04-13 RX ORDER — ACETAMINOPHEN 325 MG/1
650 TABLET ORAL EVERY 4 HOURS PRN
Status: DISCONTINUED | OUTPATIENT
Start: 2024-04-13 | End: 2024-04-14 | Stop reason: HOSPADM

## 2024-04-13 RX ORDER — MISOPROSTOL 200 UG/1
400 TABLET ORAL
Status: DISCONTINUED | OUTPATIENT
Start: 2024-04-13 | End: 2024-04-14 | Stop reason: HOSPADM

## 2024-04-13 RX ORDER — BISACODYL 10 MG
10 SUPPOSITORY, RECTAL RECTAL DAILY PRN
Status: DISCONTINUED | OUTPATIENT
Start: 2024-04-13 | End: 2024-04-14 | Stop reason: HOSPADM

## 2024-04-13 RX ORDER — LOPERAMIDE HCL 2 MG
4 CAPSULE ORAL
Status: DISCONTINUED | OUTPATIENT
Start: 2024-04-13 | End: 2024-04-14 | Stop reason: HOSPADM

## 2024-04-13 RX ORDER — TRANEXAMIC ACID 10 MG/ML
1 INJECTION, SOLUTION INTRAVENOUS EVERY 30 MIN PRN
Status: DISCONTINUED | OUTPATIENT
Start: 2024-04-13 | End: 2024-04-14 | Stop reason: HOSPADM

## 2024-04-13 RX ORDER — MODIFIED LANOLIN
OINTMENT (GRAM) TOPICAL
Status: DISCONTINUED | OUTPATIENT
Start: 2024-04-13 | End: 2024-04-14 | Stop reason: HOSPADM

## 2024-04-13 RX ORDER — HYDROCORTISONE 25 MG/G
CREAM TOPICAL 3 TIMES DAILY PRN
Status: DISCONTINUED | OUTPATIENT
Start: 2024-04-13 | End: 2024-04-14 | Stop reason: HOSPADM

## 2024-04-13 RX ORDER — LOPERAMIDE HCL 2 MG
2 CAPSULE ORAL
Status: DISCONTINUED | OUTPATIENT
Start: 2024-04-13 | End: 2024-04-14 | Stop reason: HOSPADM

## 2024-04-13 RX ORDER — IBUPROFEN 800 MG/1
800 TABLET, FILM COATED ORAL EVERY 6 HOURS PRN
Status: DISCONTINUED | OUTPATIENT
Start: 2024-04-13 | End: 2024-04-14 | Stop reason: HOSPADM

## 2024-04-13 RX ORDER — MISOPROSTOL 200 UG/1
800 TABLET ORAL
Status: DISCONTINUED | OUTPATIENT
Start: 2024-04-13 | End: 2024-04-14 | Stop reason: HOSPADM

## 2024-04-13 RX ORDER — METHYLERGONOVINE MALEATE 0.2 MG/ML
200 INJECTION INTRAVENOUS
Status: DISCONTINUED | OUTPATIENT
Start: 2024-04-13 | End: 2024-04-14 | Stop reason: HOSPADM

## 2024-04-13 RX ORDER — CARBOPROST TROMETHAMINE 250 UG/ML
250 INJECTION, SOLUTION INTRAMUSCULAR
Status: DISCONTINUED | OUTPATIENT
Start: 2024-04-13 | End: 2024-04-14 | Stop reason: HOSPADM

## 2024-04-13 RX ORDER — DOCUSATE SODIUM 100 MG/1
100 CAPSULE, LIQUID FILLED ORAL DAILY
Status: DISCONTINUED | OUTPATIENT
Start: 2024-04-13 | End: 2024-04-14 | Stop reason: HOSPADM

## 2024-04-13 RX ADMIN — Medication 340 ML/HR: at 01:51

## 2024-04-13 RX ADMIN — IBUPROFEN 800 MG: 800 TABLET, FILM COATED ORAL at 02:24

## 2024-04-13 RX ADMIN — ACETAMINOPHEN 650 MG: 325 TABLET, FILM COATED ORAL at 08:30

## 2024-04-13 RX ADMIN — IBUPROFEN 800 MG: 800 TABLET, FILM COATED ORAL at 21:57

## 2024-04-13 RX ADMIN — IBUPROFEN 800 MG: 800 TABLET, FILM COATED ORAL at 08:32

## 2024-04-13 RX ADMIN — ACETAMINOPHEN 650 MG: 325 TABLET, FILM COATED ORAL at 21:58

## 2024-04-13 RX ADMIN — ACETAMINOPHEN 650 MG: 325 TABLET, FILM COATED ORAL at 12:51

## 2024-04-13 RX ADMIN — DOCUSATE SODIUM 100 MG: 100 CAPSULE, LIQUID FILLED ORAL at 08:30

## 2024-04-13 RX ADMIN — IBUPROFEN 800 MG: 800 TABLET, FILM COATED ORAL at 15:52

## 2024-04-13 RX ADMIN — ACETAMINOPHEN 650 MG: 325 TABLET, FILM COATED ORAL at 03:44

## 2024-04-13 ASSESSMENT — ACTIVITIES OF DAILY LIVING (ADL)
ADLS_ACUITY_SCORE: 22

## 2024-04-13 NOTE — PLAN OF CARE
"Vital signs stable on room air. Bonding well with infant. Breastfeeding fair with some assistance from staff with positioning, Infant has been disinterested in breastfeeding. Pt has pumped one time with drops as outputs. Pain adequately controled with ibuprofen and tylenol. Voiding spontaneously.    Problem: Adult Inpatient Plan of Care  Goal: Plan of Care Review  Description: The Plan of Care Review/Shift note should be completed every shift.  The Outcome Evaluation is a brief statement about your assessment that the patient is improving, declining, or no change.  This information will be displayed automatically on your shift  note.  Outcome: Progressing  Flowsheets (Taken 4/13/2024 1732)  Plan of Care Reviewed With: patient  Overall Patient Progress: improving  Goal: Patient-Specific Goal (Individualized)  Description: You can add care plan individualizations to a care plan. Examples of Individualization might be:  \"Parent requests to be called daily at 9am for status\", \"I have a hard time hearing out of my right ear\", or \"Do not touch me to wake me up as it startles  me\".  Outcome: Progressing  Goal: Absence of Hospital-Acquired Illness or Injury  Outcome: Progressing  Intervention: Identify and Manage Fall Risk  Recent Flowsheet Documentation  Taken 4/13/2024 0754 by Koki Mcwilliams, GEOVANNY  Safety Promotion/Fall Prevention:   assistive device/personal items within reach   nonskid shoes/slippers when out of bed  Intervention: Prevent Skin Injury  Recent Flowsheet Documentation  Taken 4/13/2024 0754 by Koki Mcwilliams, RN  Body Position: position changed independently  Goal: Optimal Comfort and Wellbeing  Outcome: Progressing  Intervention: Monitor Pain and Promote Comfort  Recent Flowsheet Documentation  Taken 4/13/2024 1552 by Koki Mcwilliams, RN  Pain Management Interventions: medication (see MAR)  Taken 4/13/2024 1251 by Koki Mcwilliams, RN  Pain Management Interventions: medication (see MAR)  Taken 4/13/2024 0830 by Koki Mcwilliams, " RN  Pain Management Interventions: medication (see MAR)  Taken 4/13/2024 0754 by Koki Mcwilliams RN  Pain Management Interventions: heat applied  Intervention: Provide Person-Centered Care  Recent Flowsheet Documentation  Taken 4/13/2024 0754 by Koki Mcwilliams RN  Trust Relationship/Rapport:   care explained   choices provided   questions answered   questions encouraged   thoughts/feelings acknowledged  Goal: Readiness for Transition of Care  Outcome: Progressing     Problem: Postpartum (Vaginal Delivery)  Goal: Successful Parent Role Transition  Outcome: Progressing  Goal: Hemostasis  Outcome: Progressing  Goal: Absence of Infection Signs and Symptoms  Outcome: Progressing  Goal: Anesthesia/Sedation Recovery  Outcome: Progressing  Intervention: Optimize Anesthesia Recovery  Recent Flowsheet Documentation  Taken 4/13/2024 0754 by Koki Mcwilliams RN  Safety Promotion/Fall Prevention:   assistive device/personal items within reach   nonskid shoes/slippers when out of bed  Goal: Optimal Pain Control and Function  Outcome: Progressing  Intervention: Prevent or Manage Pain  Recent Flowsheet Documentation  Taken 4/13/2024 1552 by Koki Mcwilliams RN  Pain Management Interventions: medication (see MAR)  Taken 4/13/2024 1251 by Koki Mcwilliams RN  Pain Management Interventions: medication (see MAR)  Taken 4/13/2024 0830 by Koki Mcwilliams RN  Pain Management Interventions: medication (see MAR)  Taken 4/13/2024 0754 by Koki Mcwilliams RN  Pain Management Interventions: heat applied  Perineal Care:   absorbent brief/pad changed   perineum cleansed  Goal: Effective Urinary Elimination  Outcome: Progressing     Problem: Labor  Goal: Hemostasis  Outcome: Adequate for Care Transition  Goal: Stable Fetal Wellbeing  Outcome: Adequate for Care Transition  Intervention: Promote and Monitor Fetal Wellbeing  Recent Flowsheet Documentation  Taken 4/13/2024 0754 by Koki Mcwilliams RN  Body Position: position changed independently  Goal: Effective Progression to  Delivery  Outcome: Adequate for Care Transition  Goal: Absence of Infection Signs and Symptoms  Outcome: Adequate for Care Transition  Goal: Acceptable Pain Control  Outcome: Adequate for Care Transition  Goal: Normal Uterine Contraction Pattern  Outcome: Adequate for Care Transition   Goal Outcome Evaluation:      Plan of Care Reviewed With: patient    Overall Patient Progress: improvingOverall Patient Progress: improving

## 2024-04-13 NOTE — PROVIDER NOTIFICATION
04/12/24 1842   Provider Notification   Provider Name/Title Dr. Menon   Method of Notification At Bedside   Request Evaluate in Person   Notification Reason Status Update     MD bedside to discuss intrapartum plan.

## 2024-04-13 NOTE — PLAN OF CARE
Goal Outcome Evaluation:      Plan of Care Reviewed With: patient    Overall Patient Progress: improvingOverall Patient Progress: improving         Pt resting since transfer. Room orientation completed.  Heating pad used for pain. Pt reports feeling safe.

## 2024-04-13 NOTE — L&D DELIVERY NOTE
"Delivery Summary    28 year old  at 37w4d admitted for labor.  GBS pos, s/p 4h PCN.  Labored spontaneously, then AROM once in active labor.  Nothing for pain control.  Delivered vigorous female infant with apgars 8 and 9, weight pending.   Delayed cord clamping.  Placenta spontaneous, intact with 3VC.  Small left periurethral laceration, hemostatic and not repaired.  QBL 30cc.  \"Jessica\"    Carmela Schilling MD, MPH  [facility]           Yohan Ayon, Female-[place] [[id number]]      Labor Event Times      Latent labor onset date/time: 2024 1800    Active labor onset date: 24 Onset time:  9:00 PM   Dilation complete date: 24 Complete time:  1:45 AM          Labor Events    Labor Type: Spontaneous  Predominate monitoring during 1st stage: continuous electronic fetal monitoring     Antibiotics received during labor?: Yes  Reason for Antibiotics: GBS  Antibiotics received for GBS: Penicillin       Rupture date/time: 24 0010   Rupture type: Artificial Rupture of Membranes  Fluid color: Clear  Fluid odor: Normal     Augmentation: AROM  Indications for augmentation: Ineffective Contraction Pattern       Delivery/Placenta Date and Time      Delivery Date: 24 Delivery Time:  1:49 AM   Placenta Date/Time: 2024  1:56 AM  Oxytocin given at the time of delivery: after delivery of baby  Delivering clinician: Carmela Schilling MD   Other personnel present at delivery:  Provider Role   Daylin Baez RN Delivery Nurse   Tasha Main RN Delivery Assist             Vaginal Counts       Initial count performed by 2 team members:  Two Team Members   tasha schilling         Needles Suture Needles Sponges (RETIRED) Instruments   Initial counts 2  5    Added to count       Relief counts       Final counts 2              Placed during labor Accounted for at the end of labor   FSE No NA   IUPC No NA   Cervidil No NA                  Final count performed by 2 " team members:  Two Team Members   tasha schilling      Final count correct?: Yes  Pre-Birth Team Brief: Complete  Post-Birth Team Debrief: Complete       Apgars    Living status: Living   1 Minute 5 Minute 10 Minute 15 Minute 20 Minute   Skin color: 0  1       Heart rate: 2  2       Reflex irritability: 2  2       Muscle tone: 2  2       Respiratory effort: 2  2       Total: 8  9       Apgars assigned by: DAYLIN AZPATA RN       Cord      Cord Complications: None               Stem cell collection?: No           Labor Events and Shoulder Dystocia    Shoulder dystocia present?: Neg       Delivery (Maternal) (Provider to Complete) ([id number])    Episiotomy: None  Perineal lacerations: None      Periurethral laceration: left Repaired?: No   Repair suture: None  Genital tract inspection done: Pos       Blood Loss  Mother: Yohan OrtegaSangeeta parsons #[id number]     Start of Mother's Information      Delivery Blood Loss  04/12/24 2100 - 04/13/24 0203      Delivery QBL (mL) Hospital Encounter 30 mL    Total  30 mL               End of Mother's Information  Mother: Yohan OrtegaSangeeta parsons #[id number]                Delivery - Provider to Complete ([id number])    Delivering clinician: Carmela Schilling MD  Delivery Type (Choose the 1 that will go to the Birth History): Vaginal, Spontaneous                         Other personnel:  Provider Role   Daylin Baez, GEOVANNY Delivery Nurse   Tasha Main RN Delivery Assist                    Placenta    Date/Time: 4/13/2024  1:56 AM  Removal: Spontaneous  Disposition: Hospital disposal             Anesthesia    Method: None                    Presentation and Position    Presentation: Vertex     Occiput Anterior                     Carmela Schilling MD
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
## 2024-04-13 NOTE — PLAN OF CARE
Data: Saskia Ayon transferred to [facility] via wheelchair at 0430. Baby transferred via parent's arms.  Action: Receiving unit notified of transfer: Yes. Patient and family notified of room change. Report given to Maral at 0430. Belongings sent to receiving unit. Accompanied by Registered Nurse. Oriented patient to surroundings. Call light within reach. ID bands double-checked with receiving RN.  Response: Patient tolerated transfer and is stable.    Patients mobililty level scored using the bedside mobility assistance tool (BMAT). Patient is at a mobility level test number: 4. Mobility equipment used: none required. Required assist of 0 staff members. Further use of BMAT scoring not required.

## 2024-04-13 NOTE — PROVIDER NOTIFICATION
Dr Menon present at bedside and discussing AROM with patient.  AROM completed.  Will plan on starting oxytocin in 1 hour if contractions still spaced out.

## 2024-04-14 VITALS
RESPIRATION RATE: 16 BRPM | TEMPERATURE: 98.2 F | WEIGHT: 116.7 LBS | SYSTOLIC BLOOD PRESSURE: 112 MMHG | HEART RATE: 88 BPM | BODY MASS INDEX: 22.79 KG/M2 | DIASTOLIC BLOOD PRESSURE: 58 MMHG

## 2024-04-14 LAB — HGB BLD-MCNC: 8.8 G/DL (ref 11.7–15.7)

## 2024-04-14 PROCEDURE — 36415 COLL VENOUS BLD VENIPUNCTURE: CPT | Performed by: OBSTETRICS & GYNECOLOGY

## 2024-04-14 PROCEDURE — 250N000013 HC RX MED GY IP 250 OP 250 PS 637: Performed by: OBSTETRICS & GYNECOLOGY

## 2024-04-14 PROCEDURE — 85018 HEMOGLOBIN: CPT | Performed by: OBSTETRICS & GYNECOLOGY

## 2024-04-14 RX ADMIN — DOCUSATE SODIUM 100 MG: 100 CAPSULE, LIQUID FILLED ORAL at 08:05

## 2024-04-14 ASSESSMENT — ACTIVITIES OF DAILY LIVING (ADL)
ADLS_ACUITY_SCORE: 22

## 2024-04-14 NOTE — PLAN OF CARE
"Data: Vital signs within normal limits. Postpartum checks within normal limits - see flow record. Patient eating and drinking normally. Patient able to empty bladder independently and is up ambulating. No apparent signs of infection.  Perineum  healing well. Patient performing self cares and is able to care for infant.  Action: Pt denies pain. Patient reassessed within 1 hour after each medication and pain was improved - patient stated she was comfortable.  Response: Positive attachment behaviors observed with infant. Support persons are present.       Problem: Adult Inpatient Plan of Care  Goal: Plan of Care Review  Description: The Plan of Care Review/Shift note should be completed every shift.  The Outcome Evaluation is a brief statement about your assessment that the patient is improving, declining, or no change.  This information will be displayed automatically on your shift  note.  Outcome: Progressing  Flowsheets (Taken 4/14/2024 1423)  Plan of Care Reviewed With:   patient   significant other  Overall Patient Progress: improving  Goal: Patient-Specific Goal (Individualized)  Description: You can add care plan individualizations to a care plan. Examples of Individualization might be:  \"Parent requests to be called daily at 9am for status\", \"I have a hard time hearing out of my right ear\", or \"Do not touch me to wake me up as it startles  me\".  Outcome: Progressing  Goal: Absence of Hospital-Acquired Illness or Injury  Outcome: Progressing  Intervention: Prevent Skin Injury  Recent Flowsheet Documentation  Taken 4/14/2024 1122 by Koki Mwcilliams RN  Body Position: position changed independently  Intervention: Prevent Infection  Recent Flowsheet Documentation  Taken 4/14/2024 1122 by Koki Mcwilliams, RN  Infection Prevention: hand hygiene promoted  Goal: Optimal Comfort and Wellbeing  Outcome: Progressing  Intervention: Provide Person-Centered Care  Recent Flowsheet Documentation  Taken 4/14/2024 1122 by Koki Mcwilliams, " RN  Trust Relationship/Rapport:   care explained   choices provided   questions answered   questions encouraged  Goal: Readiness for Transition of Care  Outcome: Progressing     Problem: Postpartum (Vaginal Delivery)  Goal: Successful Parent Role Transition  Outcome: Progressing  Goal: Hemostasis  Outcome: Progressing  Goal: Absence of Infection Signs and Symptoms  Outcome: Progressing  Goal: Anesthesia/Sedation Recovery  Outcome: Progressing  Goal: Optimal Pain Control and Function  Outcome: Progressing  Goal: Effective Urinary Elimination  Outcome: Progressing     Problem: Labor  Goal: Stable Fetal Wellbeing  Intervention: Promote and Monitor Fetal Wellbeing  Recent Flowsheet Documentation  Taken 4/14/2024 1122 by Koki Mcwilliams RN  Body Position: position changed independently  Goal: Absence of Infection Signs and Symptoms  Intervention: Prevent or Manage Infection  Recent Flowsheet Documentation  Taken 4/14/2024 1122 by Koki Mcwilliams, RN  Infection Prevention: hand hygiene promoted   Goal Outcome Evaluation:      Plan of Care Reviewed With: patient, significant other    Overall Patient Progress: improvingOverall Patient Progress: improving

## 2024-04-14 NOTE — LACTATION NOTE
"This note was copied from a baby's chart.  Lactation visit prior to discharge.  This is Sangeeta's 3rd baby and reports successfully breastfeeding her first two. This baby born at 37.4 weeks and at 7% weight loss at 24 hours of age. Discussed energy levels and potential 37 week feeding behaviors.  Sangeeta reports overall infant breastfeeding well and at last feed was active on both breasts. Infant currently swaddled at left breast but is not latched and is sleeping- Sangeeta stating infant latched but fell asleep right away. Eduction provided on benefits of STS and un swaddling hands during breastfeeding. Writer assisted with bringing infant STS to right breast in cross cradle- more alert and awake. Reinforced deep latch techniques.  Infant able to latch with wide mouth and flanged lips but needing tactile stimulation to continue suck pattern- couple of audible swallows- mother demonstrating breast compressions. Pump at bedside and Sangeeta reports using once yesterday.  Suggested pumping after this breastfeed or at least hand expressing to give extra colostrum back d/t infant sleepy- needing frequent stimulation.  Sangeeta reports having a \"pink\" breast pump at home but does not remember brand. States she knows how to use pump and has no questions at this time. Reinforced expected feeding behaviors and goals. Bedside RN updated.   "

## 2024-04-14 NOTE — PLAN OF CARE
Goal Outcome Evaluation:      Plan of Care Reviewed With: patient, significant other    Overall Patient Progress: improvingOverall Patient Progress: improving       Goal Outcome Evaluation:       Plan of Care Reviewed With: significant other     Overall Patient Progress: improvingOverall Patient Progress: improving     VSS. Up independently. Eating & drinking normally. Mild pain controlled with tylenol & ibuprofen. Bonding well with baby girl. FOB @ bedside. Breastfeeding baby independently about every 2 hours. Encouraged pumping after nursing due to newborns weight loss & encouraged to call for feeding assistance. Fundus firm @ midline. Denies preeclampsia symptoms. Voiding adequately.              Problem: Adult Inpatient Plan of Care  Goal: Plan of Care Review  Description: The Plan of Care Review/Shift note should be completed every shift.  The Outcome Evaluation is a brief statement about your assessment that the patient is improving, declining, or no change.  This information will be displayed automatically on your shift  note.  4/14/2024 0556 by Saskia Hdez RN  Outcome: Progressing  Flowsheets (Taken 4/14/2024 0556)  Plan of Care Reviewed With:   patient   significant other  Overall Patient Progress: improving  4/14/2024 0556 by Saskia Hdez RN  Outcome: Progressing  Flowsheets (Taken 4/14/2024 0556)  Plan of Care Reviewed With:   patient   significant other  Overall Patient Progress: improving  4/14/2024 0545 by Saskia Hdez RN  Outcome: Progressing  Flowsheets (Taken 4/14/2024 0545)  Plan of Care Reviewed With: significant other  Overall Patient Progress: improving  Goal: Absence of Hospital-Acquired Illness or Injury  Intervention: Prevent Skin Injury  Recent Flowsheet Documentation  Taken 4/13/2024 2200 by Saskia Hdez RN  Body Position: position changed independently  Intervention: Prevent Infection  Recent Flowsheet Documentation  Taken 4/13/2024 2200 by Saskia Hdez  RN  Infection Prevention: hand hygiene promoted  Goal: Optimal Comfort and Wellbeing  Intervention: Provide Person-Centered Care  Recent Flowsheet Documentation  Taken 4/13/2024 2200 by Saskia Hdez RN  Trust Relationship/Rapport:   care explained   choices provided     Problem: Labor  Goal: Stable Fetal Wellbeing  Intervention: Promote and Monitor Fetal Wellbeing  Recent Flowsheet Documentation  Taken 4/13/2024 2200 by Saskia Hdez RN  Body Position: position changed independently  Goal: Absence of Infection Signs and Symptoms  Intervention: Prevent or Manage Infection  Recent Flowsheet Documentation  Taken 4/13/2024 2200 by Saskia Hdez RN  Infection Prevention: hand hygiene promoted

## 2024-04-14 NOTE — PLAN OF CARE
Data: Vital signs within normal limits. Postpartum checks within normal limits - see flow record. Patient eating and drinking normally. Patient able to empty bladder independently and is up ambulating. No apparent signs of infection.  Perineum  healing well. Patient performing self cares and is able to care for infant.  Action: Patient education done about discharge and infant cares. See flow record.  Response: Positive attachment behaviors observed with infant. Support persons are present.     Discharge instructions discussed and all questions and concerns addressed. Pt states she has a breast pump at home. No discharge medications. Pt to discharge to home in private transportation with  and infant.      Problem: Adult Inpatient Plan of Care  Goal: Plan of Care Review  Description: The Plan of Care Review/Shift note should be completed every shift.  The Outcome Evaluation is a brief statement about your assessment that the patient is improving, declining, or no change.  This information will be displayed automatically on your shift  note.  4/14/2024 1509 by Koki Mcwilliams RN  Outcome: Met  Flowsheets (Taken 4/14/2024 1509)  Plan of Care Reviewed With: patient  Overall Patient Progress: improving  4/14/2024 1423 by Koki Mcwilliams RN  Outcome: Progressing  Flowsheets (Taken 4/14/2024 1423)  Plan of Care Reviewed With:   patient   significant other  Overall Patient Progress: improving  Goal: Absence of Hospital-Acquired Illness or Injury  Intervention: Prevent Skin Injury  Recent Flowsheet Documentation  Taken 4/14/2024 1122 by Koki Mcwilliams RN  Body Position: position changed independently  Intervention: Prevent Infection  Recent Flowsheet Documentation  Taken 4/14/2024 1122 by Koki Mcwilliams RN  Infection Prevention: hand hygiene promoted  Goal: Optimal Comfort and Wellbeing  Intervention: Provide Person-Centered Care  Recent Flowsheet Documentation  Taken 4/14/2024 1122 by Koki Mcwilliams RN  Trust Relationship/Rapport:    care explained   choices provided   questions answered   questions encouraged  Goal: Readiness for Transition of Care  4/14/2024 1509 by Koki Mcwilliams RN  Outcome: Met  Flowsheets (Taken 4/14/2024 1508)  Concerns to be Addressed: all concerns addressed in this encounter  4/14/2024 1423 by Koki Mcwilliams RN  Outcome: Progressing  Intervention: Mutually Develop Transition Plan  Recent Flowsheet Documentation  Taken 4/14/2024 1508 by Koki Mcwilliams RN  Concerns to be Addressed: all concerns addressed in this encounter  Patient/Family Anticipates Transition to: home  Equipment Currently Used at Home: none     Problem: Labor  Goal: Stable Fetal Wellbeing  Intervention: Promote and Monitor Fetal Wellbeing  Recent Flowsheet Documentation  Taken 4/14/2024 1122 by Koki Mcwilliams RN  Body Position: position changed independently  Goal: Absence of Infection Signs and Symptoms  Intervention: Prevent or Manage Infection  Recent Flowsheet Documentation  Taken 4/14/2024 1122 by Koki Mcwilliams RN  Infection Prevention: hand hygiene promoted   Goal Outcome Evaluation:      Plan of Care Reviewed With: patient    Overall Patient Progress: improvingOverall Patient Progress: improving

## 2024-04-14 NOTE — PLAN OF CARE
Goal Outcome Evaluation:      Plan of Care Reviewed With: significant other    Overall Patient Progress: improvingOverall Patient Progress: improving    VSS. Up independently. Eating & drinking normally. Mild pain controlled with tylenol & ibuprofen. Bonding well with baby girl. FOB @ bedside. Breastfeeding baby independently about every 2 hours. Encouraged pumping after nursing due to newborns weight loss & encouraged to call for feeding assistance. Fundus firm @ midline. Denies preeclampsia symptoms. Voiding adequately.

## 2024-04-14 NOTE — DISCHARGE INSTRUCTIONS
"Postpartum Care at Home With Your Baby: Care Instructions  Overview     After childbirth (postpartum period), your body goes through many changes as you recover. In these weeks after delivery, try to take good care of yourself. Get rest whenever you can and accept help from others.  It may take 4 to 6 weeks to feel like yourself again, and possibly longer if you had a  birth. You may feel sore or very tired as you recover. After delivery, you may continue to have contractions as the uterus returns to the size it was before your pregnancy. You will also have some vaginal bleeding. And you may have pain around the vagina as you heal. Several days after delivery you may also have pain and swelling in your breasts as they fill with milk. There are things you can do at home to help ease these discomforts.  After childbirth, it's common to feel emotional. You may feel irritable, cry easily, and feel happy one minute and sad the next. This is called the \"baby blues.\" Hormone changes are one cause of these emotional changes. These feelings usually get better within a couple of weeks. If they don't, talk to your doctor or midwife.  In the first couple of weeks after you give birth, your doctor or midwife may want to check in with you and make a plan for follow-up care. You will likely have a complete postpartum visit in the first 3 months after delivery. At that time, your doctor or midwife will check on your recovery and see how you're doing. But if you have questions or concerns before then, you can always call your doctor or midwife.  Follow-up care is a key part of your treatment and safety. Be sure to make and go to all appointments, and call your doctor if you are having problems. It's also a good idea to know your test results and keep a list of the medicines you take.  How can you care for yourself at home?  Taking care of your body  Use pads instead of tampons for bleeding. After birth, you will have bloody " vaginal discharge. You may also pass some blood clots that shouldn't be bigger than an egg. Over the next 6 weeks or so, your bleeding should decrease a little every day and slowly change to a pinkish and then whitish discharge.  For cramps or mild pain, try an over-the-counter pain medicine, such as acetaminophen (Tylenol) or ibuprofen (Advil, Motrin). Read and follow all instructions on the label.  To ease pain around the vagina or from hemorrhoids:  Put ice or a cold pack on the area for 10 to 20 minutes at a time. Put a thin cloth between the ice and your skin.  Try sitting in a few inches of warm water (sitz bath) when you can or after bowel movements.  Clean yourself with a gentle squeeze of warm water from a bottle instead of wiping with toilet paper.  Use witch hazel or hemorrhoid pads (such as Tucks).  Try using a cold compress for sore and swollen breasts. And wear a supportive bra that fits.  Ease constipation by drinking plenty of fluids and eating high-fiber foods. Ask your doctor or midwife about over-the-counter stool softeners.  Activity  Rest when you can.  Ask for help from family or friends when you need it.  If you can, have another adult in your home for at least 2 or 3 days after birth.  When you feel ready, try to get some exercise every day. For many people, walking is a good choice. Don't do any heavy exercise until your doctor or midwife says it's okay.  Ask your doctor or midwife when it is okay to have vaginal sex.  If you don't want to get pregnant, talk to your doctor or midwife about birth control options. You can get pregnant even before your period returns. Also, you can get pregnant while you are breastfeeding.  Talk to your doctor or midwife if you want to get pregnant again. They can talk to you about when it is safe.  Emotional health  It's normal to have some sadness, anxiety, and mood swings after delivery. It may help to talk with a trusted friend or family member. You can  also call the Maternal Mental Health Hotline at 3-549-COI-MAMA (1-668.717.9019) for support. If these mood changes last more than a couple of weeks, talk to your doctor or midwife.  When should you call for help?  Share this information with your partner, family, or a friend. They can help you watch for warning signs.  Call 911  anytime you think you may need emergency care. For example, call if:    You feel you cannot stop from hurting yourself, your baby, or someone else.     You passed out (lost consciousness).     You have chest pain, are short of breath, or cough up blood.     You have a seizure.   Where to get help 24 hours a day, 7 days a week   If you or someone you know talks about suicide, self-harm, a mental health crisis, a substance use crisis, or any other kind of emotional distress, get help right away. You can:    Call the Suicide and Crisis Lifeline at 988.     Call 2-916-064-TALK (1-471.154.1817).     Text HOME to 718870 to access the Crisis Text Line.   Consider saving these numbers in your phone.  Go to [employer] for more information or to chat online.  Call your doctor or midwife now or seek immediate medical care if:    You have signs of hemorrhage (too much bleeding), such as:  Heavy vaginal bleeding. This means that you are soaking through one or more pads in an hour. Or you pass blood clots bigger than an egg.  Feeling dizzy or lightheaded, or you feel like you may faint.  Feeling so tired or weak that you cannot do your usual activities.  A fast or irregular heartbeat.  New or worse belly pain.     You have signs of infection, such as:  A fever.  Increased pain, swelling, warmth, or redness from an incision or wound.  Frequent or painful urination or blood in your urine.  Vaginal discharge that smells bad.  New or worse belly pain.     You have symptoms of a blood clot in your leg (called a deep vein thrombosis), such as:  Pain in the calf, back of the knee, thigh, or groin.  Swelling  "in the leg or groin.  A color change on the leg or groin. The skin may be reddish or purplish, depending on your usual skin color.     You have signs of preeclampsia, such as:  Sudden swelling of your face, hands, or feet.  New vision problems (such as dimness, blurring, or seeing spots).  A severe headache.     You have signs of heart failure, such as:  New or increased shortness of breath.  New or worse swelling in your legs, ankles, or feet.  Sudden weight gain, such as more than 2 to 3 pounds in a day or 5 pounds in a week.  Feeling so tired or weak that you cannot do your usual activities.     You had spinal or epidural pain relief and have:  New or worse back pain.  Increased pain, swelling, warmth, or redness at the injection site.  Tingling, weakness, or numbness in your legs or groin.   Watch closely for changes in your health, and be sure to contact your doctor or midwife if:    Your vaginal bleeding isn't decreasing.     You feel sad, anxious, or hopeless for more than a few days.     You are having problems with your breasts or breastfeeding.   Where can you learn more?  Go to https://www.[employer].net/patiented  Enter Z768 in the search box to learn more about \"Postpartum Care at Home With Your Baby: Care Instructions.\"  Current as of: July 10, 2023               Content Version: 14.0    5793-1064 [employer].   Care instructions adapted under license by your healthcare professional. If you have questions about a medical condition or this instruction, always ask your healthcare professional. [employer] disclaims any warranty or liability for your use of this information.        Warning Signs after Having a Baby    Keep this paper on your fridge or somewhere else where you can see it.    Call your provider if you have any of these symptoms up to 12 weeks after having your baby.    Thoughts of hurting yourself or your baby  Pain in your chest or trouble breathing  Severe " headache not helped by pain medicine  Eyesight concerns (blurry vision, seeing spots or flashes of light, other changes to eyesight)  Fainting, shaking or other signs of a seizure    Call 9-1-1 if you feel that it is an emergency.     The symptoms below can happen to anyone after giving birth. They can be very serious. Call your provider if you have any of these warning signs.    My provider s phone number: _______________________    Losing too much blood (hemorrhage)    Call your provider if you soak through a pad in less than an hour or pass blood clots bigger than a golf ball. These may be signs that you are bleeding too much.    Blood clots in the legs or lungs    After you give birth, your body naturally clots its blood to help prevent blood loss. Sometimes this increased clotting can happen in other areas of the body, like the legs or lungs. This can block your blood flow and be very dangerous.     Call your provider if you:  Have a red, swollen spot on the back of your leg that is warm or painful when you touch it.   Are coughing up blood.     Infection    Call your provider if you have any of these symptoms:  Fever of 100.4 F (38 C) or higher.  Pain or redness around your stitches if you had an incision.   Any yellow, white, or green fluid coming from places where you had stitches or surgery.    Mood Problems (postpartum depression)    Many people feel sad or have mood changes after having a baby. But for some people, these mood swings are worse.     Call your provider right away if you feel so anxious or nervous that you can't care for yourself or your baby.    Preeclampsia (high blood pressure)    Even if you didn't have high blood pressure when you were pregnant, you are at risk for the high blood pressure disease called preeclampsia. This risk can last up to 12 weeks after giving birth.     Call your provider if you have:   Pain on your right side under your rib cage  Sudden swelling in the hands and  face    Remember: You know your body. If something doesn't feel right, get medical help.     For informational purposes only. Not to replace the advice of your health care provider. Copyright 2020 [employer]. All rights reserved. Clinically reviewed by [employer] RNC-OB, MSN. [employer] [id number] - Rev 02/23.

## 2024-04-14 NOTE — DISCHARGE SUMMARY
"VAGINAL DELIVERY DISCHARGE SUMMARY    Admit date: 2024  Discharge date: 2024     Admit Dx:   28 year old  at 37w4d    GBS pos  H/o HSV2  History of anemia    Discharge Dx:  - Same as above, s/p   - acute on chronic anemia    Procedures:  - Spontaneous vaginal delivery    Admit HPI:  28 year old  at 37w3d presented with contractions and made cervical change from 2.5 to 3.5cm.  she had a history of precipitous labor with her second baby, and was GBS pos.  EFW AGA 7#, pelvis proven to 7#.  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  Planning unmedicated delivery.     Please see her admit H&P for full details of her PMH, PSH, Meds, Allergies and exam on admit.    Hospital course:  28 year old  at 37w4d admitted for labor.  GBS pos, s/p 4h PCN.  Labored spontaneously, then AROM once in active labor.  Nothing for pain control.  Delivered vigorous female infant with apgars 8 and 9, weight pending.   Delayed cord clamping.  Placenta spontaneous, intact with 3VC.  Small left periurethral laceration, hemostatic and not repaired.  QBL 30cc.  \"Jessica\"     Please see her Delivery Summary for full details regarding her delivery.    Her postpartum course was complicated by nothing. By the time of discharge, she was meeting all of her postpartum goals and deemed stable for discharge. She was voiding without difficulty, tolerating a regular diet without nausea and vomiting, her pain was well controlled on oral pain medicines and her lochia was appropriate. She was hemodynamically stable.     Physical exam on the day of discharge:  Vitals:    24 1720 24 2153 24 0126 24 1122   BP: 124/72 111/61 108/59 112/58   BP Location: Right arm Right arm Right arm Right arm   Patient Position: Semi-Cabral's Semi-Cabral's Semi-Cabral's Semi-Cabral's   Cuff Size: Adult Regular Adult Regular Adult Regular Adult " Regular   Pulse: 91 86 79 88   Resp: 16 16 18 16   Temp: 98.1  F (36.7  C) 98.2  F (36.8  C) 97.9  F (36.6  C) 98.2  F (36.8  C)   TempSrc: Oral Oral Oral Oral       General: sitting up, alert and cooperative  Abd: soft, non-distended, non-tender. Fundus firm, nontender, 2 cm below umbilicus.   Extremities: calves nontender, trace edema of lower extremities bilaterally    Lab Results   Component Value Date    HGB 8.8 04/14/2024    HGB 9.6 04/12/2024    HGB 12.1 06/22/2021    HGB 14.0 06/17/2019     Blood type:   Lab Results   Component Value Date    RH Pos 06/22/2021       Discharge/Disposition:  Ms. Saskia Ayon was discharged to home in stable condition with the following instructions/medications:  1) Call for temperature > 100.4, foul smelling vaginal discharge, bleeding > 1 pad per hour x 2 hrs, pain not controlled by oral pain meds, thoughts of self-harm or harming the infant.  2) Contraception counseling was provided.  3) She was instructed to follow-up with her primary OB in 6 weeks for a routine postpartum visit, and in 1 week for a blood pressure check if having any blood pressure issues while hospitalized.  4) She was instructed to continue her PNV on discharge if she wished to breast feed her infant.  5) She was discharged home with the following medications:    Current Discharge Medication List        CONTINUE these medications which have NOT CHANGED    Details   Prenatal Vit-Fe Fumarate-FA (PRENATAL VITAMIN) 27-0.8 MG TABS            STOP taking these medications       clindamycin (CLEOCIN) 300 MG capsule Comments:   Reason for Stopping:         penicillin V (VEETID) 250 MG tablet Comments:   Reason for Stopping:                Carmela Menon MD, MPH  [facility]

## 2024-04-15 ENCOUNTER — PATIENT OUTREACH (OUTPATIENT)
Dept: CARE COORDINATION | Facility: CLINIC | Age: 29
End: 2024-04-15
Payer: COMMERCIAL

## 2024-04-15 NOTE — PROGRESS NOTES
"Clinic Care Coordination Contact  [facility]: Post-Discharge Note  SITUATION                                                      Admission:    Admission Date: 24   Reason for Admission: 28 year old  at 37w4d, GBS pos, H/o HSV2, History of anemia  Discharge:   Discharge Date: 24  Discharge Diagnosis: Same as above, s/p , Acute on chronic anemia    BACKGROUND                                                      Per hospital discharge summary and inpatient provider notes:    28 year old  at 37w3d presented with contractions and made cervical change from 2.5 to 3.5cm.  she had a history of precipitous labor with her second baby, and was GBS pos.  EFW AGA 7#, pelvis proven to 7#.  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  Planning unmedicated delivery.      Please see her admit H&P for full details of her PMH, PSH, Meds, Allergies and exam on admit.    ASSESSMENT      Discharge Assessment  How are you doing now that you are home?: \"I'm okay thank you. So far everything has been good...she's eating when she's suppose to. She is actually eating quite a bit. The milk supply was a concern at the hospital because she was born but my supply came in.\"  How are your symptoms? (Red Flag symptoms escalate to triage hotline per guidelines): Improved  Do you feel your condition is stable enough to be safe at home until your provider visit?: Yes  Does the patient have their discharge instructions? : Yes  Does the patient have questions regarding their discharge instructions? : No  Were you started on any new medications or were there changes to any of your previous medications? : No  Does the patient have all of their medications?: Yes  Do you have questions regarding any of your medications? : No  Do you have all of your needed medical supplies or equipment (DME)?  (i.e. oxygen tank, CPAP, cane, etc.): Yes  Discharge " follow-up appointment scheduled within 14 calendar days? : Yes  Discharge Follow Up Appointment Date: 04/19/24  Discharge Follow Up Appointment Scheduled with?: Specialty Care Provider (OB/GYN)    Post-op ([facility] Only)  If the patient had a surgery or procedure, do they have any questions for a nurse?: No    PLAN                                                      Outpatient Plan:      Follow Up  Follow up with provider in 6 weeks for post-delivery check    Future Appointments   Date Time Provider Department Center   4/19/2024  2:15 PM Jamel Tolbert MD [facility]   4/23/2024  2:00 PM Daylin Alarcon MD [facility]   6/7/2024 10:00 AM Jamel Tolbert MD [facility]         For any urgent concerns, please contact our 24 hour nurse triage line: [phone] ([phone])         FRANCISCA Byers  [phone]  [facility]

## 2024-06-04 NOTE — PROGRESS NOTES
SUBJECTIVE:  Saskia Ayon,  is here for a postpartum visit.  She had a  on 2024 delivering a healthy baby girl, named Rossana weighing 6 lbs  no oz at term.      HPI:    Last PHQ-9 score on record=       2015     3:59 PM   PHQ-9 SCORE   PHQ-9 Total Score 2          No data to display                  Pap: (  Lab Results   Component Value Date    PAP NIL 2020    )      Delivery complications:  No  Breast feeding:  Yes,   Bladder problems:  No  Bowel problems/hemorrhoids:  No  Episiotomy/laceration/incision healed? No  Vaginal flow:  None  [place]:  Yes, 3 weeks ago  Contraception: IUD  Emotional adjustment:  doing well and happy  Back to work:yes    12 point review of systems negative other than symptoms noted below or in the HPI.    OBJECTIVE:  Vitals: Wt 47.6 kg (105 lb)   LMP 2023 (Approximate)   Breastfeeding Yes   BMI 20.51 kg/m    BMI= Body mass index is 20.51 kg/m .  General - pleasant female in no acute distress.  Pelvic - EG: normal adult female, BUS: within normal limits, Vagina: well rugated, no discharge, Cervix: no lesions or CMT, Uterus: firm, normal sized and nontender, midplane in position. Adnexae: no masses or tenderness.  Rectovaginal - deferred.    ASSESSMENT:    ICD-10-CM    1. Routine postpartum follow-up  Z39.2 HCG qualitative urine POCT, Enter/Edit Result      2. Pre-procedure lab exam  Z01.812 HCG qualitative urine POCT, Enter/Edit Result      3. Encounter for insertion of intrauterine contraceptive device  Z30.430           PLAN:  May resume normal activities without restrictions.  Pap smear was not done today.    Full counseling was provided, and all questions were answered.   Return to clinic in one year for an annual visit.     There are no Patient Instructions on file for this visit.  Jamel Tolbert MD        IUD Insertion:  CONSULT:    Is a pregnancy test required: Yes.  Was it positive or negative?  Negative  Was a consent obtained?   Yes    Subjective: Saskia Ayon is a 28 year old  presents for IUD and desires Mirena type IUD.    Patient has been given the opportunity to ask questions about all forms of birth control, including all options appropriate for Saskia Ayon. Discussed that no method of birth control, except abstinence is 100% effective against pregnancy or sexually transmitted infection.     Saskia Ayon understands she may have the IUD removed at any time. IUD should be removed by a health care provider.    The entire insertion procedure was reviewed with the patient, including care after placement.    Patient's last menstrual period was 2023 (approximate). Last sexual activity: 3 weeks . No allergy to betadine or shellfish. Patient desires STD screening  HCG Qual Urine   Date Value Ref Range Status   2018 Negative NEG^Negative Final     Comment:     This test is for screening purposes.  Results should be interpreted along with   the clinical picture.  Confirmation testing is available if warranted by   ordering [id number], HCG Quantitative Pregnancy.           Wt 47.6 kg (105 lb)   LMP 2023 (Approximate)   Breastfeeding Yes   BMI 20.51 kg/m      Pelvic Exam:   EG/BUS: normal genital architecture without lesions, erythema or abnormal secretions.   Vagina: moist, pink, rugae with physiologic discharge and secretions  Cervix: parous no lesions and pink, moist, closed, without lesion or CMT  Uterus: normal position, mobile, no pain  Adnexa: within normal limits and no masses, nodularity, tenderness    PROCEDURE NOTE: -- IUD Insertion    Reason for Insertion: contraception      Under sterile technique, cervix was visualized with speculum and prepped with Betadine solution swab x 3. Tenaculum was placed for stability. The uterus was gently straightened and sounded to 7.0 cm. IUD prepared for placement, and IUD inserted according to 's instructions without  difficulty or significant resitance, and deployed at the fundus. The strings were visualized and trimmed to 2.5 cm from the external os. Tenaculum was removed and hemostasis noted. Speculum removed.  Patient tolerated procedure well.      EBL: minimal    Complications: none    ASSESSMENT:     ICD-10-CM    1. Routine postpartum follow-up  Z39.2 HCG qualitative urine POCT, Enter/Edit Result      2. Pre-procedure lab exam  Z01.812 HCG qualitative urine POCT, Enter/Edit Result      3. Encounter for insertion of intrauterine contraceptive device  Z30.430              PLAN:    Given 's handouts, including when to have IUD removed, list of danger s/sx, side effects and follow up recommended. Encouraged condom use for prevention of STD. Back up contraception advised for 7 days if progestin method. Advised to call for any fever, for prolonged or severe pain or bleeding, abnormal vaginal discharge, or unable to palpate strings. She was advised to use pain medications (ibuprofen) as needed for mild to moderate pain. Advised to follow-up in clinic in 4-6 weeks for IUD string check if unable to find strings or as directed by provider.     Jamel Tolbert MD

## 2024-06-07 ENCOUNTER — PRENATAL OFFICE VISIT (OUTPATIENT)
Dept: OBGYN | Facility: CLINIC | Age: 29
End: 2024-06-07
Payer: COMMERCIAL

## 2024-06-07 VITALS — WEIGHT: 105 LBS | BODY MASS INDEX: 20.51 KG/M2

## 2024-06-07 DIAGNOSIS — Z30.430 ENCOUNTER FOR INSERTION OF INTRAUTERINE CONTRACEPTIVE DEVICE: Primary | ICD-10-CM

## 2024-06-07 DIAGNOSIS — Z01.812 PRE-PROCEDURE LAB EXAM: ICD-10-CM

## 2024-06-07 LAB
HCG UR QL: NEGATIVE
INTERNAL QC OK POCT: NORMAL
POCT KIT EXPIRATION DATE: NORMAL
POCT KIT LOT NUMBER: NORMAL

## 2024-06-07 PROCEDURE — 99207 PR POST PARTUM EXAM: CPT | Performed by: OBSTETRICS & GYNECOLOGY

## 2024-06-07 PROCEDURE — 81025 URINE PREGNANCY TEST: CPT | Performed by: OBSTETRICS & GYNECOLOGY

## 2024-06-07 PROCEDURE — 87491 CHLMYD TRACH DNA AMP PROBE: CPT | Performed by: OBSTETRICS & GYNECOLOGY

## 2024-06-07 PROCEDURE — 58300 INSERT INTRAUTERINE DEVICE: CPT | Performed by: OBSTETRICS & GYNECOLOGY

## 2024-06-07 PROCEDURE — 87591 N.GONORRHOEAE DNA AMP PROB: CPT | Performed by: OBSTETRICS & GYNECOLOGY

## 2024-06-07 RX ORDER — FERROUS SULFATE 325(65) MG
325 TABLET, DELAYED RELEASE (ENTERIC COATED) ORAL DAILY
COMMUNITY

## 2024-06-07 ASSESSMENT — EDINBURGH POSTNATAL DEPRESSION SCALE (EPDS)
I HAVE FELT SCARED OR PANICKY FOR NO GOOD REASON: NO, NOT AT ALL
I HAVE BLAMED MYSELF UNNECESSARILY WHEN THINGS WENT WRONG: NO, NEVER
THINGS HAVE BEEN GETTING ON TOP OF ME: NO, MOST OF THE TIME I HAVE COPED QUITE WELL
I HAVE BEEN ANXIOUS OR WORRIED FOR NO GOOD REASON: NO, NOT AT ALL
THE THOUGHT OF HARMING MYSELF HAS OCCURRED TO ME: NEVER
TOTAL SCORE: 1
I HAVE BEEN SO UNHAPPY THAT I HAVE BEEN CRYING: NO, NEVER
I HAVE BLAMED MYSELF UNNECESSARILY WHEN THINGS WENT WRONG: NO, NEVER
THINGS HAVE BEEN GETTING ON TOP OF ME: NO, MOST OF THE TIME I HAVE COPED QUITE WELL
I HAVE BEEN SO UNHAPPY THAT I HAVE HAD DIFFICULTY SLEEPING: NOT AT ALL
I HAVE LOOKED FORWARD WITH ENJOYMENT TO THINGS: AS MUCH AS I EVER DID
I HAVE LOOKED FORWARD WITH ENJOYMENT TO THINGS: AS MUCH AS I EVER DID
I HAVE BEEN SO UNHAPPY THAT I HAVE HAD DIFFICULTY SLEEPING: NOT AT ALL
I HAVE FELT SCARED OR PANICKY FOR NO GOOD REASON: NO, NOT AT ALL
THE THOUGHT OF HARMING MYSELF HAS OCCURRED TO ME: NEVER
I HAVE BEEN SO UNHAPPY THAT I HAVE BEEN CRYING: NO, NEVER
I HAVE BEEN ANXIOUS OR WORRIED FOR NO GOOD REASON: NO, NOT AT ALL
I HAVE BEEN ABLE TO LAUGH AND SEE THE FUNNY SIDE OF THINGS: AS MUCH AS I ALWAYS COULD
I HAVE BEEN ABLE TO LAUGH AND SEE THE FUNNY SIDE OF THINGS: AS MUCH AS I ALWAYS COULD
I HAVE FELT SAD OR MISERABLE: NO, NOT AT ALL
I HAVE FELT SAD OR MISERABLE: NO, NOT AT ALL

## 2024-06-07 NOTE — NURSING NOTE
Chief Complaint   Patient presents with    Postpartum Care       Initial Wt 47.6 kg (105 lb)   LMP 2023 (Approximate)   Breastfeeding Yes   BMI 20.51 kg/m   Estimated body mass index is 20.51 kg/m  as calculated from the following:    Height as of 23: 1.524 m (5').    Weight as of this encounter: 47.6 kg (105 lb).  BP completed using cuff size: regular    Questioned patient about current smoking habits.  Pt. has never smoked.          The following HM Due: pap smear  Wants IUD    Karen Palencia LPN on 2024 at 10:14 AM

## 2024-06-08 LAB
C TRACH DNA SPEC QL NAA+PROBE: NEGATIVE
N GONORRHOEA DNA SPEC QL NAA+PROBE: NEGATIVE

## 2024-07-07 ENCOUNTER — HEALTH MAINTENANCE LETTER (OUTPATIENT)
Age: 29
End: 2024-07-07

## 2024-12-12 ENCOUNTER — LAB (OUTPATIENT)
Dept: LAB | Facility: CLINIC | Age: 29
End: 2024-12-12
Payer: COMMERCIAL

## 2024-12-12 DIAGNOSIS — R30.0 DYSURIA: ICD-10-CM

## 2024-12-12 LAB
ALBUMIN UR-MCNC: ABNORMAL MG/DL
AMORPH CRY #/AREA URNS HPF: ABNORMAL /HPF
APPEARANCE UR: ABNORMAL
BACTERIA #/AREA URNS HPF: ABNORMAL /HPF
BILIRUB UR QL STRIP: NEGATIVE
COLOR UR AUTO: YELLOW
GLUCOSE UR STRIP-MCNC: NEGATIVE MG/DL
HGB UR QL STRIP: NEGATIVE
KETONES UR STRIP-MCNC: NEGATIVE MG/DL
LEUKOCYTE ESTERASE UR QL STRIP: ABNORMAL
NITRATE UR QL: NEGATIVE
PH UR STRIP: 7 [PH] (ref 5–7)
RBC #/AREA URNS AUTO: ABNORMAL /HPF
SP GR UR STRIP: 1.02 (ref 1–1.03)
SQUAMOUS #/AREA URNS AUTO: ABNORMAL /LPF
UROBILINOGEN UR STRIP-ACNC: 0.2 E.U./DL
WBC #/AREA URNS AUTO: ABNORMAL /HPF

## 2024-12-18 LAB
BACTERIA UR CULT: ABNORMAL
BACTERIA UR CULT: ABNORMAL

## 2025-02-03 ENCOUNTER — NURSE TRIAGE (OUTPATIENT)
Dept: NURSING | Facility: CLINIC | Age: 30
End: 2025-02-03

## 2025-02-03 ENCOUNTER — OFFICE VISIT (OUTPATIENT)
Dept: FAMILY MEDICINE | Facility: CLINIC | Age: 30
End: 2025-02-03
Payer: COMMERCIAL

## 2025-02-03 VITALS
BODY MASS INDEX: 20.48 KG/M2 | SYSTOLIC BLOOD PRESSURE: 109 MMHG | HEIGHT: 60 IN | OXYGEN SATURATION: 99 % | DIASTOLIC BLOOD PRESSURE: 67 MMHG | WEIGHT: 104.3 LBS | HEART RATE: 54 BPM | RESPIRATION RATE: 14 BRPM | TEMPERATURE: 98.1 F

## 2025-02-03 DIAGNOSIS — T78.40XA ALLERGIC RESPONSE, INITIAL ENCOUNTER: Primary | ICD-10-CM

## 2025-02-03 PROBLEM — K35.80: Status: RESOLVED | Noted: 2024-04-01 | Resolved: 2025-02-03

## 2025-02-03 PROBLEM — B96.89 BACTERIAL VAGINOSIS IN PREGNANCY: Status: RESOLVED | Noted: 2024-04-01 | Resolved: 2025-02-03

## 2025-02-03 PROBLEM — O99.619: Status: RESOLVED | Noted: 2024-04-01 | Resolved: 2025-02-03

## 2025-02-03 PROBLEM — O23.599 BACTERIAL VAGINOSIS IN PREGNANCY: Status: RESOLVED | Noted: 2024-04-01 | Resolved: 2025-02-03

## 2025-02-03 PROBLEM — Z36.89 ENCOUNTER FOR TRIAGE IN PREGNANT PATIENT: Status: RESOLVED | Noted: 2024-04-12 | Resolved: 2025-02-03

## 2025-02-03 PROBLEM — O99.820 GBS (GROUP B STREPTOCOCCUS CARRIER), +RV CULTURE, CURRENTLY PREGNANT: Status: RESOLVED | Noted: 2024-04-01 | Resolved: 2025-02-03

## 2025-02-03 LAB
ALBUMIN SERPL BCG-MCNC: 4.5 G/DL (ref 3.5–5.2)
ALP SERPL-CCNC: 62 U/L (ref 40–150)
ALT SERPL W P-5'-P-CCNC: 12 U/L (ref 0–50)
ANION GAP SERPL CALCULATED.3IONS-SCNC: 11 MMOL/L (ref 7–15)
AST SERPL W P-5'-P-CCNC: 21 U/L (ref 0–45)
BASOPHILS # BLD AUTO: 0 10E3/UL (ref 0–0.2)
BASOPHILS NFR BLD AUTO: 1 %
BILIRUB SERPL-MCNC: 0.6 MG/DL
BUN SERPL-MCNC: 13.5 MG/DL (ref 6–20)
CALCIUM SERPL-MCNC: 9.2 MG/DL (ref 8.8–10.4)
CHLORIDE SERPL-SCNC: 105 MMOL/L (ref 98–107)
CREAT SERPL-MCNC: 0.52 MG/DL (ref 0.51–0.95)
EGFRCR SERPLBLD CKD-EPI 2021: >90 ML/MIN/1.73M2
EOSINOPHIL # BLD AUTO: 0.1 10E3/UL (ref 0–0.7)
EOSINOPHIL NFR BLD AUTO: 2 %
ERYTHROCYTE [DISTWIDTH] IN BLOOD BY AUTOMATED COUNT: 12.3 % (ref 10–15)
GLUCOSE SERPL-MCNC: 93 MG/DL (ref 70–99)
HCO3 SERPL-SCNC: 23 MMOL/L (ref 22–29)
HCT VFR BLD AUTO: 41.8 % (ref 35–47)
HGB BLD-MCNC: 14.1 G/DL (ref 11.7–15.7)
IMM GRANULOCYTES # BLD: 0 10E3/UL
IMM GRANULOCYTES NFR BLD: 0 %
LYMPHOCYTES # BLD AUTO: 1.9 10E3/UL (ref 0.8–5.3)
LYMPHOCYTES NFR BLD AUTO: 38 %
MCH RBC QN AUTO: 29.4 PG (ref 26.5–33)
MCHC RBC AUTO-ENTMCNC: 33.7 G/DL (ref 31.5–36.5)
MCV RBC AUTO: 87 FL (ref 78–100)
MONOCYTES # BLD AUTO: 0.4 10E3/UL (ref 0–1.3)
MONOCYTES NFR BLD AUTO: 8 %
NEUTROPHILS # BLD AUTO: 2.6 10E3/UL (ref 1.6–8.3)
NEUTROPHILS NFR BLD AUTO: 51 %
NRBC # BLD AUTO: 0 10E3/UL
NRBC BLD AUTO-RTO: 0 /100
PLATELET # BLD AUTO: 283 10E3/UL (ref 150–450)
POTASSIUM SERPL-SCNC: 3.7 MMOL/L (ref 3.4–5.3)
PROT SERPL-MCNC: 7.4 G/DL (ref 6.4–8.3)
RBC # BLD AUTO: 4.8 10E6/UL (ref 3.8–5.2)
SODIUM SERPL-SCNC: 139 MMOL/L (ref 135–145)
WBC # BLD AUTO: 5.1 10E3/UL (ref 4–11)

## 2025-02-03 PROCEDURE — 85025 COMPLETE CBC W/AUTO DIFF WBC: CPT | Performed by: NURSE PRACTITIONER

## 2025-02-03 PROCEDURE — 99203 OFFICE O/P NEW LOW 30 MIN: CPT | Performed by: NURSE PRACTITIONER

## 2025-02-03 PROCEDURE — 36415 COLL VENOUS BLD VENIPUNCTURE: CPT | Performed by: NURSE PRACTITIONER

## 2025-02-03 PROCEDURE — 80053 COMPREHEN METABOLIC PANEL: CPT | Performed by: NURSE PRACTITIONER

## 2025-02-03 PROCEDURE — G2211 COMPLEX E/M VISIT ADD ON: HCPCS | Performed by: NURSE PRACTITIONER

## 2025-02-03 RX ORDER — DIPHENHYDRAMINE HCL 25 MG
25 TABLET ORAL
Qty: 20 TABLET | Refills: 0 | Status: SHIPPED | OUTPATIENT
Start: 2025-02-03

## 2025-02-03 RX ORDER — CETIRIZINE HYDROCHLORIDE 10 MG/1
10 TABLET ORAL DAILY
Qty: 30 TABLET | Refills: 0 | Status: SHIPPED | OUTPATIENT
Start: 2025-02-03

## 2025-02-03 RX ORDER — PREDNISONE 20 MG/1
20 TABLET ORAL 2 TIMES DAILY
Qty: 10 TABLET | Refills: 0 | Status: SHIPPED | OUTPATIENT
Start: 2025-02-03 | End: 2025-02-08

## 2025-02-03 ASSESSMENT — PAIN SCALES - GENERAL: PAINLEVEL_OUTOF10: NO PAIN (0)

## 2025-02-03 NOTE — TELEPHONE ENCOUNTER
Triage call  Woke up her face has a burning stinging sensation and in her arms  in her arms  she also itches she feel like her  throat is a little swollen and her tongue feels different .  She also states her breathing feels more labored than usual.but she does not feel like she is struggling to breath. She also has swelling in her eye lidsShe has no know allergies and has not eaten or used anything that she has not used before.. She will go to the  ED if her symptoms worsen before seeing the PCP  today.    Per protocol see in office today.  Care advice given.  Verbalizes understanding and agrees with plan.    Colleen Young RN   [facility] Nurse Advisor  7:07 AM 2/3/2025    Reason for Disposition   Life-threatening allergic reaction (anaphylaxis) suspected   Nursing judgment    Additional Information   Negative: [1] Life-threatening reaction in the past to similar substance (e.g., food, insect bite/sting, medication, etc.) AND [2] < 2 hours since exposure   Negative: Wheezing, stridor, hoarseness, or difficulty breathing   Negative: [1] Tightness in the chest or throat AND [2] begins within 2 hours of exposure to allergic substance   Negative: Difficulty swallowing, drooling or slurred speech   Negative: Difficult to awaken or acting confused (e.g., disoriented, slurred speech)   Negative: Unresponsive, passed out or very weak   Negative: Other symptom of severe allergic reaction  (Exception: Hives or facial swelling alone.)   Negative: Sounds like a life-threatening emergency to the triager   Negative: [1] Widespread hives AND [2] onset > 2 hours after exposure to high-risk allergen (e.g., sting, nuts, 1st dose of antibiotic)   Negative: [1] Widespread itching AND [2] onset > 2 hours after exposure to high-risk allergen (e.g., sting, nuts, 1st dose of antibiotic)   Negative: [1] Face swelling AND [2] onset > 2 hours after exposure to high-risk allergen (e.g., sting, nuts, 1st dose of antibiotic)    "Negative: Chemical gets into the eye from fingers, contaminated object, spray, or splash   Negative: Eye pain   Negative: Runny nose, nose itching, and sneezing also present   Negative: Reaction to antibiotic eye drops   Negative: [1] Sacs of clear fluid (blisters) on whites of eyes AND [2] painful AND [3] not improved 2 hours after allergy treatment per guideline   Negative: [1] Blurred vision AND [2] new or worsening   Negative: Sacs of clear fluid (blisters) on whites of eyes or inner lids   Negative: Eyelids are very swollen (shut or almost)   Negative: [1] Taking allergy medicine > 2 days AND [2] still has pus on eyelids   Negative: [1] Taking allergy medicine > 2 days AND [2] eyes are very itchy   Negative: Contact lenses makes itching or redness worse   Negative: Eye allergy is a chronic symptom (recurrent or ongoing AND present > 4 weeks)   Negative: Diagnosis of eye allergy never confirmed   Negative: History of rheumatoid arthritis or systemic lupus (or other \"collagen vascular disorder\")   Negative: Symptoms began after use of a new facial cosmetic, hair care product or nail polish   Negative: Mild eye allergy   Negative: [1] Widespread hives, itching or facial swelling AND [2] onset < 2 hours of exposure to high-risk allergen (e.g., sting, nuts, 1st dose of antibiotic)   Negative: [1] Vomiting or abdominal cramps AND [2] onset < 2 hours of exposure to high-risk allergen (e.g., sting, nuts, 1st dose of antibiotic)   Negative: [1] Had epinephrine shot AND [2] no symptoms now   Negative: [1] Used asthma inhaler or neb AND [2] no symptoms now   Negative: [1] Took antihistamine (e.g., Benadryl) by mouth AND [2] no symptoms now   Negative: Sounds like a life-threatening emergency to the triager   Negative: Information only call about a Well Adult (no illness or injury)   Negative: Caller can't be reached by phone   Negative: Nursing judgment   Negative: Nursing judgment   Negative: Nursing judgment   Negative: " Nursing judgment   Negative: Nursing judgment    Protocols used: Allergic Reactions - Guideline [id number], Zrtcnuftnxg-E-CE, Eye - Allergy-A-AH, No Protocol [id number]-CK

## 2025-02-03 NOTE — PATIENT INSTRUCTIONS
Take your meds as prescribed.    Scripts / refills sent to your pharmacy.     Get labs today.     Benadryl at bedtime for the next 3-5 days.   Zyrtec in the morning for 1 month.   Prednisone twice daily with food and water for 5 days.     Increase water (64 ounces per day), fruits, and vegetables.    Exercise at least 5 days per week for 30 minutes each day.     See me this week Wednesday for follow-up.    Follow-up if any changes or worsening symptoms.  To ER.

## 2025-02-03 NOTE — PROGRESS NOTES
Assessment & Plan   (T78.40XA) Allergic response, initial encounter  (primary encounter diagnosis)  Comment: New onset.  See patient instructions.  Labs and meds as noted.  Follow-up with me this week.    Plan: CBC with Platelets & Differential,         Comprehensive metabolic panel, predniSONE         (DELTASONE) 20 MG tablet, cetirizine (ZYRTEC)         10 MG tablet, diphenhydrAMINE (BENADRYL) 25 MG         tablet       Patient Instructions   Take your meds as prescribed.    Scripts / refills sent to your pharmacy.     Get labs today.     Benadryl at bedtime for the next 3-5 days.   Zyrtec in the morning for 1 month.   Prednisone twice daily with food and water for 5 days.     Increase water (64 ounces per day), fruits, and vegetables.    Exercise at least 5 days per week for 30 minutes each day.     See me this week Wednesday for follow-up.    Follow-up if any changes or worsening symptoms.  To ER.        The longitudinal plan of care for the diagnosis(es)/condition(s) as documented were addressed during this visit. Due to the added complexity in care, I will continue to support Sangeeta in the subsequent management and with ongoing continuity of care.        Subjective   Sangeeta is a 29 year old, presenting for the following health issues:  Facial Swelling        2/3/2025    10:42 AM   Additional Questions   Roomed by Jaqui Llanes, EMT-B     History of Present Illness       Reason for visit:  Face and eye swelling  Symptom onset:  1-3 days ago  Symptoms include:  Itching and burning sensation  Symptom intensity:  Moderate  Symptom progression:  Improving  Had these symptoms before:  No  What makes it worse:  No  What makes it better:  No   She is taking medications regularly.    Concern - Facial Burning, itching, and swelling around the eyes and cheeks  Onset: 1 day  Description: some facial swelling, skin feels like it's burning, stretched out and itching, sore throat, some dizziness  Intensity:  severe  Progression of Symptoms:  swelling has improved, itching and burning sensations still present  Accompanying Signs & Symptoms: dizziness, lips felt 'different', but were not visibly swollen.  Previous history of similar problem: none  Precipitating factors:        Worsened by: none  Alleviating factors:        Improved by: none  Therapies tried and outcome: None    Face felt very dry and stretched out.  Assumed this was dry skin.  Used lotion and then went to bed.  This morning, her face felt really dry, swollen, and red.  Feels more puffy this morning; puffy lips and eyelids.  Sore scratchy throat.  No difficulty swallowing.  Felt slight SOB or chest heaviness, but this has gotten better.  Showed me a picture of her this morning at 0700 and then seeing now in clinic at 11am.  Puffiness and breathing has improved.  Works as  at dentist office.      Had pizza with black olives on Saturday night (4pm) and Sunday morning (11am).  Has not had black olives for over the past year so this is essentially new for her.     Pt states she has not had any new lotions, soaps, facial wash items.  No new foods.  No known allergies; nothing that she is aware of.  No new meds.        Review of Systems  Constitutional, neuro, ENT, endocrine, pulmonary, cardiac, gastrointestinal, genitourinary, musculoskeletal, integument and psychiatric systems are negative, except as otherwise noted.        Objective    /67 (BP Location: Right arm, Patient Position: Sitting, Cuff Size: Adult Regular)   Pulse 54   Temp 98.1  F (36.7  C) (Oral)   Resp 14   Ht 1.524 m (5')   Wt 47.3 kg (104 lb 4.8 oz)   SpO2 99%   Breastfeeding No   BMI 20.37 kg/m    Body mass index is 20.37 kg/m .  Physical Exam  Constitutional:       General: She is not in acute distress.     Appearance: Normal appearance. She is not ill-appearing.   HENT:      Head: Normocephalic.      Right Ear: Tympanic membrane, ear canal and external ear normal.  There is no impacted cerumen.      Left Ear: Tympanic membrane, ear canal and external ear normal. There is no impacted cerumen.      Ears:      Comments: Mild redness of skin of face.  Mild puffiness of lips and eyelids.  This has improved since 700 am this morning.        Nose: Nose normal. No congestion or rhinorrhea.      Mouth/Throat:      Mouth: Mucous membranes are moist.      Pharynx: Oropharynx is clear. No oropharyngeal exudate or posterior oropharyngeal erythema.   Eyes:      General: No scleral icterus.        Right eye: No discharge.         Left eye: No discharge.      Conjunctiva/sclera: Conjunctivae normal.   Neck:      Comments: Thyroid is normal shape and size and smooth.  No nodules or enlargement noted.  No pain noted.      Cardiovascular:      Rate and Rhythm: Normal rate and regular rhythm.      Heart sounds: Normal heart sounds. No murmur heard.     No friction rub. No gallop.   Pulmonary:      Effort: Pulmonary effort is normal. No respiratory distress.      Breath sounds: Normal breath sounds. No wheezing, rhonchi or rales.   Musculoskeletal:      Cervical back: Normal range of motion and neck supple. No rigidity or tenderness.   Lymphadenopathy:      Cervical: No cervical adenopathy.   Skin:     General: Skin is warm and dry.      Findings: Erythema and rash present.      Comments: Faint rash like area and redness of fore arms.     Neurological:      General: No focal deficit present.      Mental Status: She is alert.   Psychiatric:         Mood and Affect: Mood normal.         Behavior: Behavior normal.         Thought Content: Thought content normal.         Judgment: Judgment normal.              Signed Electronically by: CICI Nicolas CNP

## 2025-02-04 ENCOUNTER — MYC MEDICAL ADVICE (OUTPATIENT)
Dept: FAMILY MEDICINE | Facility: CLINIC | Age: 30
End: 2025-02-04
Payer: COMMERCIAL

## 2025-02-04 NOTE — TELEPHONE ENCOUNTER
See MyChart Message. Please review and advise on plan. This is update from visit with CICI Almonte CNP yesterday for allergic response. Has follow up visit scheduled with CICI Almonte CNP for tomorrow.     Michelle Worrell RN on 2/4/2025 at 3:18 PM

## 2025-02-05 ENCOUNTER — OFFICE VISIT (OUTPATIENT)
Dept: FAMILY MEDICINE | Facility: CLINIC | Age: 30
End: 2025-02-05
Payer: COMMERCIAL

## 2025-02-05 VITALS
RESPIRATION RATE: 18 BRPM | BODY MASS INDEX: 20.55 KG/M2 | TEMPERATURE: 98 F | HEIGHT: 60 IN | DIASTOLIC BLOOD PRESSURE: 64 MMHG | HEART RATE: 67 BPM | OXYGEN SATURATION: 100 % | WEIGHT: 104.7 LBS | SYSTOLIC BLOOD PRESSURE: 106 MMHG

## 2025-02-05 DIAGNOSIS — R22.0 FACIAL SWELLING: ICD-10-CM

## 2025-02-05 DIAGNOSIS — T78.40XD ALLERGIC RESPONSE, SUBSEQUENT ENCOUNTER: Primary | ICD-10-CM

## 2025-02-05 DIAGNOSIS — L53.9 REDNESS OF SKIN: ICD-10-CM

## 2025-02-05 DIAGNOSIS — L30.9 DERMATITIS: ICD-10-CM

## 2025-02-05 PROCEDURE — 99213 OFFICE O/P EST LOW 20 MIN: CPT | Performed by: NURSE PRACTITIONER

## 2025-02-05 PROCEDURE — G2211 COMPLEX E/M VISIT ADD ON: HCPCS | Performed by: NURSE PRACTITIONER

## 2025-02-05 RX ORDER — FAMOTIDINE 20 MG/1
20 TABLET, FILM COATED ORAL 2 TIMES DAILY
Qty: 60 TABLET | Refills: 1 | Status: SHIPPED | OUTPATIENT
Start: 2025-02-05

## 2025-02-05 RX ORDER — EPINEPHRINE 0.3 MG/.3ML
0.3 INJECTION SUBCUTANEOUS PRN
Qty: 2 EACH | Refills: 0 | Status: SHIPPED | OUTPATIENT
Start: 2025-02-05

## 2025-02-05 ASSESSMENT — PAIN SCALES - GENERAL: PAINLEVEL_OUTOF10: NO PAIN (0)

## 2025-02-05 NOTE — PROGRESS NOTES
Assessment & Plan   (T78.40XD) Allergic response, subsequent encounter  (primary encounter diagnosis)  Comment:  Improving.  Continue with current meds.  Epi pen to have on hand in case another episode occurs.  If uses Epi pen, then will need to be seen in ER.    Plan: Adult Dermatology  Referral,         famotidine (PEPCID) 20 MG tablet, EPINEPHrine         (ANY BX GENERIC EQUIV) 0.3 MG/0.3ML injection         2-pack    (L53.9) Redness of skin  Comment: Persistent.  Slightly improved with Aloe Vera gel.  Continue to monitor.   Plan: Adult Dermatology  Referral    (R22.0) Facial swelling  Comment: Improving.    Plan: Adult Dermatology  Referral    (L30.9) Dermatitis  Comment:  Persistent.  See Dermatology.   Plan: Adult Dermatology  Referral,         famotidine (PEPCID) 20 MG tablet       Patient Instructions   Take your meds as prescribed.    Scripts / refills sent to your pharmacy.     See Dermatology.      Increase water (64 ounces per day), fruits, and vegetables.    Avoid black olives at this time.     Exercise at least 5 days per week for 30 minutes each day when feeling better.    Follow-up if any changes or worsening symptoms.      MyChart me with updates.         The longitudinal plan of care for the diagnosis(es)/condition(s) as documented were addressed during this visit. Due to the added complexity in care, I will continue to support Sangeeta in the subsequent management and with ongoing continuity of care.        Genaro Rutherford is a 29 year old, presenting for the following health issues:  RECHECK (Allergic reaction)    Went to the urgency room yesterday - face was red and swollen was accompanied by burning and tingling    2/5/2025     2:06 PM   Additional Questions   Roomed by juan   Accompanied by samuel         2/5/2025     2:06 PM   Patient Reported Additional Medications   Patient reports taking the following new medications na     History of Present Illness        Reason for visit:  Face and eye swelling  Symptom onset:  1-3 days ago  Symptoms include:  Itching and burning sensation  Symptom intensity:  Moderate  Symptom progression:  Improving  Had these symptoms before:  No  What makes it worse:  No  What makes it better:  No   She is taking medications regularly.     She went to Urgency Room due to redness and tightness of skin and itchiness.  Told to stay on regimen.  Pepcid was added.  Overall she is feeling better today.  Tried Aloe Vera on face at bedtime and this was good.  Would like an Epi-pen to have on hand.         Review of Systems  Constitutional, neuro, ENT, endocrine, pulmonary, cardiac, gastrointestinal, genitourinary, musculoskeletal, integument and psychiatric systems are negative, except as otherwise noted.        Objective    /64 (BP Location: Right arm, Patient Position: Sitting, Cuff Size: Adult Regular)   Pulse 67   Temp 98  F (36.7  C) (Tympanic)   Resp 18   Ht 1.524 m (5')   Wt 47.5 kg (104 lb 11.2 oz)   LMP  (LMP Unknown)   SpO2 100%   BMI 20.45 kg/m    Body mass index is 20.45 kg/m .  Physical Exam  Constitutional:       General: She is not in acute distress.     Appearance: Normal appearance. She is not ill-appearing.   HENT:      Head: Normocephalic.   Cardiovascular:      Rate and Rhythm: Normal rate and regular rhythm.      Heart sounds: Normal heart sounds. No murmur heard.     No friction rub. No gallop.   Pulmonary:      Effort: Pulmonary effort is normal. No respiratory distress.      Breath sounds: Normal breath sounds. No wheezing, rhonchi or rales.   Musculoskeletal:      Cervical back: Normal range of motion and neck supple. No rigidity.   Skin:     General: Skin is warm and dry.      Findings: Erythema present. No rash.      Comments: Redness of face persists, but slightly improved today.  No significant swelling.  No swelling of lips or eyelids.    Neurological:      General: No focal deficit present.      Mental  Status: She is alert.   Psychiatric:         Mood and Affect: Mood normal.         Behavior: Behavior normal.         Thought Content: Thought content normal.         Judgment: Judgment normal.              Signed Electronically by: CICI Nicolas CNP

## 2025-02-05 NOTE — PATIENT INSTRUCTIONS
Take your meds as prescribed.    Scripts / refills sent to your pharmacy.     See Dermatology.      Increase water (64 ounces per day), fruits, and vegetables.    Avoid black olives at this time.     Exercise at least 5 days per week for 30 minutes each day when feeling better.    Follow-up if any changes or worsening symptoms.      MyChart me with updates.

## 2025-02-07 ENCOUNTER — OFFICE VISIT (OUTPATIENT)
Dept: DERMATOLOGY | Facility: CLINIC | Age: 30
End: 2025-02-07
Attending: NURSE PRACTITIONER
Payer: COMMERCIAL

## 2025-02-07 DIAGNOSIS — L30.9 DERMATITIS: ICD-10-CM

## 2025-02-07 DIAGNOSIS — R22.0 FACIAL SWELLING: ICD-10-CM

## 2025-02-07 PROCEDURE — 99203 OFFICE O/P NEW LOW 30 MIN: CPT | Performed by: PHYSICIAN ASSISTANT

## 2025-02-07 RX ORDER — PIMECROLIMUS 10 MG/G
CREAM TOPICAL
Qty: 30 G | Refills: 5 | Status: SHIPPED | OUTPATIENT
Start: 2025-02-07

## 2025-02-07 ASSESSMENT — PAIN SCALES - GENERAL: PAINLEVEL_OUTOF10: NO PAIN (0)

## 2025-02-07 NOTE — LETTER
2/7/2025      Saskia Ayon  [address]  [place] [address]      Dear Colleague,    Thank you for referring your patient, Saskia Ayon, to the [facility]. Please see a copy of my visit note below.    HPI:   Chief complaints: Saskia Ayon is a pleasant 29 year old female who presents for evaluation of facial redness and swelling. Earlier this week she noticed that her skin was dry and rough. She applied moisturizers to her skin but this did not seem to help. Tuesday she woke up and had swelling around her eyes and her throat felt scratchy. She was seen in the ER and was given antihistamines and an epi pen. The following day her face developed pronounced redness. At this time she did not have any swelling or difficulty breathing. She was seen in the ER again and given prednisone which has helped. She is much improved today and is finishing her final day of prednisone. She has not had a similar episode in the past. She does not have a history of food or environmental allergies. She does not have a history of eczema although her son has a history of eczema. She has been using plain vaseline on her face.       PHYSICAL EXAM:    LMP  (LMP Unknown)   Skin exam performed as follows: Type 3 skin. Mood appropriate  Alert and Oriented X 3. Well developed, well nourished in no distress.  General appearance: Normal  Head including face: Normal  Eyes: conjunctiva and lids: Normal  Mouth: Lips, teeth, gums: Normal  Neck: Normal  Skin: Scalp and body hair: See below    Skin clear; some discrete macular areas of PIH on the right cheek     ASSESSMENT/PLAN:     Facial dermatitis, uncertain etiology - discussed possibility of eczema vs rosacea although can not rule out the possibility of an environmental or food allergy.   --Start Elidel BID PRN to face  --RTC if worsening  --Refer to allergy for eval        Follow-up: PRN  CC:   Scribed By: Jennifer  MS Arnel, PA-C      Again, thank you for allowing me to participate in the care of your patient.        Sincerely,        Jennifer Jenkins PA-C    Electronically signed

## 2025-02-07 NOTE — PROGRESS NOTES
HPI:   Chief complaints: Saskia Ayon is a pleasant 29 year old female who presents for evaluation of facial redness and swelling. Earlier this week she noticed that her skin was dry and rough. She applied moisturizers to her skin but this did not seem to help. Tuesday she woke up and had swelling around her eyes and her throat felt scratchy. She was seen in the ER and was given antihistamines and an epi pen. The following day her face developed pronounced redness. At this time she did not have any swelling or difficulty breathing. She was seen in the ER again and given prednisone which has helped. She is much improved today and is finishing her final day of prednisone. She has not had a similar episode in the past. She does not have a history of food or environmental allergies. She does not have a history of eczema although her son has a history of eczema. She has been using plain vaseline on her face.       PHYSICAL EXAM:    LMP  (LMP Unknown)   Skin exam performed as follows: Type 3 skin. Mood appropriate  Alert and Oriented X 3. Well developed, well nourished in no distress.  General appearance: Normal  Head including face: Normal  Eyes: conjunctiva and lids: Normal  Mouth: Lips, teeth, gums: Normal  Neck: Normal  Skin: Scalp and body hair: See below    Skin clear; some discrete macular areas of PIH on the right cheek     ASSESSMENT/PLAN:     Facial dermatitis, uncertain etiology - discussed possibility of eczema vs rosacea although can not rule out the possibility of an environmental or food allergy.   --Start Elidel BID PRN to face  --RTC if worsening  --Refer to allergy for eval        Follow-up: PRN  CC:   Scribed By: Jennifer Seals, MS, PA-C

## 2025-02-07 NOTE — PATIENT INSTRUCTIONS
There are several things that could be causing the redness - either dryness/eczema or rosacea     When you start to have dry skin, itchy skin, redness, apply pimecrolimus cream twice per day until better.     I will also refer you to allergy

## 2025-02-10 ENCOUNTER — TELEPHONE (OUTPATIENT)
Dept: DERMATOLOGY | Facility: CLINIC | Age: 30
End: 2025-02-10
Payer: COMMERCIAL

## 2025-02-10 NOTE — TELEPHONE ENCOUNTER
Prior Authorization Specialty Medication Request    Medication/Dose: Pimecrolimus 1% Ext crm (ely) 30gm   Diagnosis and ICD code (if different than what is on RX):    New/renewal/insurance change PA/secondary ins. PA:  Previously Tried and Failed:      Important Lab Values:   Rationale:     Insurance   Primary:   Insurance ID:      Secondary (if applicable):  Insurance ID:      Pharmacy Information (if different than what is on RX)  Name:    Phone:    Fax:    Clinic Information  Preferred routing pool for dept communication:

## 2025-02-13 NOTE — TELEPHONE ENCOUNTER
Retail Pharmacy Prior Authorization Team   Phone: [phone]    Prior Authorization Approval    Medication: PIMECROLIMUS 1 % EX CREA  Authorization Effective Date: 1/14/2025  Authorization Expiration Date: 2/13/2026  Reference #: [id number]  Insurance Company: [employer] Non-Specialty PA's - Phone [phone] Fax [phone]  Which Pharmacy is filling the prescription: [id number] [id number] [address]  Pharmacy Notified: YES  Patient Notified: **Instructed pharmacy to notify patient when script is ready to /ship.**

## 2025-02-14 PROBLEM — T78.40XA ALLERGIC RESPONSE: Status: ACTIVE | Noted: 2025-02-14

## 2025-02-14 PROBLEM — L30.9 FACIAL DERMATITIS: Status: ACTIVE | Noted: 2025-02-14

## 2025-05-03 ENCOUNTER — MYC MEDICAL ADVICE (OUTPATIENT)
Dept: INTERNAL MEDICINE | Facility: CLINIC | Age: 30
End: 2025-05-03
Payer: COMMERCIAL

## 2025-05-05 NOTE — TELEPHONE ENCOUNTER
Spoke with patient.  C/o intermittent dizziness x 2-3 wks.  No other symptoms.    Recommended patient stay well hydrated and eating a balanced diet.  Go to urgent care if dizziness becomes constant or ED if she faints/passes out.

## 2025-05-08 ENCOUNTER — OFFICE VISIT (OUTPATIENT)
Dept: INTERNAL MEDICINE | Facility: CLINIC | Age: 30
End: 2025-05-08
Payer: COMMERCIAL

## 2025-05-08 VITALS
BODY MASS INDEX: 19.63 KG/M2 | HEART RATE: 96 BPM | OXYGEN SATURATION: 98 % | TEMPERATURE: 97.6 F | WEIGHT: 100 LBS | HEIGHT: 60 IN | SYSTOLIC BLOOD PRESSURE: 98 MMHG | DIASTOLIC BLOOD PRESSURE: 68 MMHG | RESPIRATION RATE: 12 BRPM

## 2025-05-08 DIAGNOSIS — Z13.29 SCREENING FOR THYROID DISORDER: ICD-10-CM

## 2025-05-08 DIAGNOSIS — Z00.00 PREVENTATIVE HEALTH CARE: Primary | ICD-10-CM

## 2025-05-08 DIAGNOSIS — Z13.1 DIABETES MELLITUS SCREENING: ICD-10-CM

## 2025-05-08 DIAGNOSIS — Z13.0 SCREENING, IRON DEFICIENCY ANEMIA: ICD-10-CM

## 2025-05-08 DIAGNOSIS — Z13.220 LIPID SCREENING: ICD-10-CM

## 2025-05-08 DIAGNOSIS — Z13.228 SCREENING FOR METABOLIC DISORDER: ICD-10-CM

## 2025-05-08 LAB
ALBUMIN SERPL BCG-MCNC: 4.6 G/DL (ref 3.5–5.2)
ALP SERPL-CCNC: 68 U/L (ref 40–150)
ALT SERPL W P-5'-P-CCNC: 11 U/L (ref 0–50)
ANION GAP SERPL CALCULATED.3IONS-SCNC: 11 MMOL/L (ref 7–15)
AST SERPL W P-5'-P-CCNC: 21 U/L (ref 0–45)
BILIRUB SERPL-MCNC: 0.6 MG/DL
BUN SERPL-MCNC: 12.9 MG/DL (ref 6–20)
CALCIUM SERPL-MCNC: 9.3 MG/DL (ref 8.8–10.4)
CHLORIDE SERPL-SCNC: 104 MMOL/L (ref 98–107)
CHOLEST SERPL-MCNC: 146 MG/DL
CREAT SERPL-MCNC: 0.69 MG/DL (ref 0.51–0.95)
EGFRCR SERPLBLD CKD-EPI 2021: >90 ML/MIN/1.73M2
ERYTHROCYTE [DISTWIDTH] IN BLOOD BY AUTOMATED COUNT: 11.6 % (ref 10–15)
EST. AVERAGE GLUCOSE BLD GHB EST-MCNC: 105 MG/DL
FASTING STATUS PATIENT QL REPORTED: YES
FASTING STATUS PATIENT QL REPORTED: YES
GLUCOSE SERPL-MCNC: 94 MG/DL (ref 70–99)
HBA1C MFR BLD: 5.3 % (ref 0–5.6)
HCO3 SERPL-SCNC: 24 MMOL/L (ref 22–29)
HCT VFR BLD AUTO: 43 % (ref 35–47)
HDLC SERPL-MCNC: 53 MG/DL
HGB BLD-MCNC: 15.2 G/DL (ref 11.7–15.7)
HIV 1+2 AB+HIV1 P24 AG SERPL QL IA: NONREACTIVE
LDLC SERPL CALC-MCNC: 84 MG/DL
MCH RBC QN AUTO: 30.8 PG (ref 26.5–33)
MCHC RBC AUTO-ENTMCNC: 35.3 G/DL (ref 31.5–36.5)
MCV RBC AUTO: 87 FL (ref 78–100)
NONHDLC SERPL-MCNC: 93 MG/DL
PLATELET # BLD AUTO: 299 10E3/UL (ref 150–450)
POTASSIUM SERPL-SCNC: 3.7 MMOL/L (ref 3.4–5.3)
PROT SERPL-MCNC: 7.6 G/DL (ref 6.4–8.3)
RBC # BLD AUTO: 4.94 10E6/UL (ref 3.8–5.2)
SODIUM SERPL-SCNC: 139 MMOL/L (ref 135–145)
TRIGL SERPL-MCNC: 45 MG/DL
TSH SERPL DL<=0.005 MIU/L-ACNC: 0.55 UIU/ML (ref 0.3–4.2)
WBC # BLD AUTO: 4.7 10E3/UL (ref 4–11)

## 2025-05-08 SDOH — HEALTH STABILITY: PHYSICAL HEALTH: ON AVERAGE, HOW MANY DAYS PER WEEK DO YOU ENGAGE IN MODERATE TO STRENUOUS EXERCISE (LIKE A BRISK WALK)?: 1 DAY

## 2025-05-08 ASSESSMENT — SOCIAL DETERMINANTS OF HEALTH (SDOH): HOW OFTEN DO YOU GET TOGETHER WITH FRIENDS OR RELATIVES?: TWICE A WEEK

## 2025-05-08 NOTE — PROGRESS NOTES
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 Bili, TP)    (Z13.1) Diabetes mellitus screening  Plan: Hemoglobin A1c    (Z13.220) Lipid screening  Plan: Lipid panel reflex to direct LDL Fasting    (Z13.29) Screening for thyroid disorder  Plan: TSH with free T4 reflex      Counseling  Appropriate preventive services were addressed with this patient via screening, questionnaire, or discussion as appropriate for fall prevention, nutrition, physical activity, Tobacco-use cessation, social engagement, weight loss and cognition.  Checklist reviewing preventive services available has been given to the patient.  Reviewed patient's diet, addressing concerns and/or questions.   She is at risk for lack of exercise and has been provided with information to increase physical activity for the benefit of her well-being.   She is at risk for psychosocial distress and has been provided with information to reduce risk.       Genaro Rutherford is a 29 year old, presenting for the following:  Physical (fasting)        5/8/2025     6:56 AM   Additional Questions   Roomed by Mary Beth ELLISON          Advance Care Planning  Discussed advance care planning with patient; however, patient declined at this time.        5/8/2025   General Health   How would you rate your overall physical health? Good   Feel stress (tense, anxious, or unable to sleep) To some extent   (!) STRESS  CONCERN      5/8/2025   Nutrition   Three or more servings of calcium each day? Yes   Diet: I don't know   How many servings of fruit and vegetables per day? (!) 0-1   How many sweetened beverages each day? (!) 2         5/8/2025   Exercise   Days per week of moderate/strenous exercise 1 day   (!) EXERCISE CONCERN      5/8/2025   Social Factors   Frequency of gathering with friends or relatives Twice a week   Worry food won't last until get money to buy more No   Food not last or not have enough money for food? No   Do you have housing? (Housing is defined as stable permanent housing and does not include staying outside in a car, in a tent, in an abandoned building, in an overnight shelter, or couch-surfing.) Yes   Are you worried about losing your housing? No   Lack of transportation? No   Unable to get utilities (heat,electricity)? No         5/8/2025   Dental   Dentist two times every year? Yes           5/8/2025   Substance Use   Alcohol more than 3/day or more than 7/wk Not Applicable   Do you use any other substances recreationally? No     Social History     Tobacco Use    Smoking status: Former     Passive exposure: Never    Smokeless tobacco: Never   Vaping Use    Vaping status: Never Used   Substance Use Topics    Alcohol use: No     Alcohol/week: 0.0 standard drinks of alcohol    Drug use: No             5/8/2025   STI Screening   New sexual partner(s) since last STI/HIV test? (!) YES            11/18/2020     3:38 PM   PAP / HPV   PAP (Historical) NIL            5/8/2025   Contraception/Family Planning   Questions about contraception or family planning No       Past Medical History:   Diagnosis Date    Bacterial vaginosis in pregnancy 04/01/2024    Encounter for triage in pregnant patient 04/12/2024    GBS (group B Streptococcus carrier), +RV culture, currently pregnant 04/01/2024    Genital herpes     HSV-2 infection complicating pregnancy, third trimester 12/31/2021    Indication for care in labor or  "delivery 04/12/2024    IUD (intrauterine device) in place 01/05/2016    Removed 11/18/2020    Pregnancy complicated by acute appendicitis 04/01/2024    Urinary tract infection     Varicella      Past Surgical History:   Procedure Laterality Date    INSERT INTRAUTERINE DEVICE  01/05/2016    Mirena    LAPAROSCOPIC APPENDECTOMY N/A 12/15/2023    Procedure: LAPAROSCOPIC APPENDECTOMY;  Surgeon: Jennifer Lindo MD;  Location: [facility] OR    ORTHOPEDIC SURGERY      left foot         Review of Systems  Constitutional, neuro, ENT, endocrine, pulmonary, cardiac, gastrointestinal, genitourinary, musculoskeletal, integument and psychiatric systems are negative, except as otherwise noted.     Objective    Exam  BP 98/68   Pulse 96   Temp 97.6  F (36.4  C) (Tympanic)   Resp 12   Ht 1.518 m (4' 11.75\")   Wt 45.4 kg (100 lb)   LMP  (LMP Unknown)   SpO2 98%   BMI 19.69 kg/m     Estimated body mass index is 19.69 kg/m  as calculated from the following:    Height as of this encounter: 1.518 m (4' 11.75\").    Weight as of this encounter: 45.4 kg (100 lb).    Physical Exam  Vitals reviewed.   Constitutional:       Appearance: Normal appearance.   HENT:      Head: Atraumatic.   Eyes:      Extraocular Movements: Extraocular movements intact.   Cardiovascular:      Rate and Rhythm: Normal rate and regular rhythm.   Pulmonary:      Breath sounds: Normal breath sounds.   Abdominal:      Palpations: Abdomen is soft.   Musculoskeletal:         General: Normal range of motion.      Cervical back: Normal range of motion.   Skin:     General: Skin is warm.   Neurological:      General: No focal deficit present.   Psychiatric:         Mood and Affect: Mood normal.         Signed Electronically by: Juan Diaz MD  "

## 2025-05-08 NOTE — NURSING NOTE
Chief Complaint   Patient presents with    Physical     fasting     initial LMP  (LMP Unknown)  Estimated body mass index is 20.45 kg/m  as calculated from the following:    Height as of 2/5/25: 1.524 m (5').    Weight as of 2/5/25: 47.5 kg (104 lb 11.2 oz)..  bp completed using cuff size regular  STEPH PRINCE LPN

## 2025-05-13 ENCOUNTER — RESULTS FOLLOW-UP (OUTPATIENT)
Dept: INTERNAL MEDICINE | Facility: CLINIC | Age: 30
End: 2025-05-13

## 2025-05-20 ENCOUNTER — TELEPHONE (OUTPATIENT)
Dept: OBGYN | Facility: CLINIC | Age: 30
End: 2025-05-20
Payer: COMMERCIAL

## 2025-05-20 DIAGNOSIS — Z32.01 PREGNANCY TEST POSITIVE: ICD-10-CM

## 2025-05-20 DIAGNOSIS — Z30.431 IUD CHECK UP: Primary | ICD-10-CM

## 2025-05-20 NOTE — TELEPHONE ENCOUNTER
Spoke with the pt. Had a Mirena IUD placed on 6/7/24. Has not been having periods.    Had a pos UHCG this am.     Took the test as she was having some sx that were similar to how she felt with her previous pregnancy.    Denies vag spotting/bleeding.     She had not tried to feel the IUD strings.     Ectopic precautions reviewed with the pt.     Pt will have an US done tomorrow with an appt with the on-call provider to follow.     Message routed to that doctor.     Lizette ARCOS RN  [facility]

## 2025-05-20 NOTE — TELEPHONE ENCOUNTER
[facility] Call Center    Phone Message    May a detailed message be left on voicemail: yes     Reason for Call: Other: Pt currently has IUD in and got a positive pregnancy test. Pt doesn't know LMP or how far along she is, writer unable to schedule. Please advise       Action Taken: Message routed to:  Other: [facility]    Travel Screening: Not Applicable

## 2025-05-21 ENCOUNTER — OFFICE VISIT (OUTPATIENT)
Dept: OBGYN | Facility: CLINIC | Age: 30
End: 2025-05-21
Payer: COMMERCIAL

## 2025-05-21 ENCOUNTER — ANCILLARY PROCEDURE (OUTPATIENT)
Dept: ULTRASOUND IMAGING | Facility: CLINIC | Age: 30
End: 2025-05-21
Attending: OBSTETRICS & GYNECOLOGY
Payer: COMMERCIAL

## 2025-05-21 DIAGNOSIS — Z32.01 PREGNANCY TEST POSITIVE: ICD-10-CM

## 2025-05-21 DIAGNOSIS — Z30.431 IUD CHECK UP: ICD-10-CM

## 2025-05-21 DIAGNOSIS — Z97.5 IUD CONTRACEPTION: Primary | ICD-10-CM

## 2025-05-21 PROCEDURE — 81025 URINE PREGNANCY TEST: CPT | Performed by: OBSTETRICS & GYNECOLOGY

## 2025-05-21 PROCEDURE — 99207 PR NO CHARGE LOS: CPT | Performed by: OBSTETRICS & GYNECOLOGY

## 2025-05-21 NOTE — PROGRESS NOTES
Advised of results by phone.    Sangeeta will call back if symptoms continue and we will order a HCG quantitative  Patricia Liao CMA

## 2025-05-21 NOTE — NURSING NOTE
Ultrasound today for IUD/ pregnancy check.  See phone encounter as patient had a positive home pregnancy test.   Ultrasound done and in clinic urine hcg done   Patient advised of results by phone. Also reminded her that she is due for a pap test.  Patricia Liao CMA

## 2025-05-26 ENCOUNTER — RESULTS FOLLOW-UP (OUTPATIENT)
Dept: OBGYN | Facility: CLINIC | Age: 30
End: 2025-05-26
Payer: COMMERCIAL

## 2025-05-26 DIAGNOSIS — Z32.01 PREGNANCY TEST POSITIVE: Primary | ICD-10-CM

## 2025-05-28 ENCOUNTER — LAB (OUTPATIENT)
Dept: LAB | Facility: CLINIC | Age: 30
End: 2025-05-28
Payer: COMMERCIAL

## 2025-05-28 DIAGNOSIS — Z30.431 IUD CHECK UP: ICD-10-CM

## 2025-05-28 DIAGNOSIS — Z30.431 IUD CHECK UP: Primary | ICD-10-CM

## 2025-05-28 LAB — HCG INTACT+B SERPL-ACNC: <1 MIU/ML

## 2025-05-28 PROCEDURE — 36415 COLL VENOUS BLD VENIPUNCTURE: CPT

## 2025-05-28 PROCEDURE — 84702 CHORIONIC GONADOTROPIN TEST: CPT

## (undated) DEVICE — ENDO TROCAR FIRST ENTRY KII FIOS Z-THRD 05X100MM CTF03

## (undated) DEVICE — ESU ELEC BLADE 2.75" COATED/INSULATED E1455

## (undated) DEVICE — GOWN IMPERVIOUS ZONED XLG 9041

## (undated) DEVICE — Device

## (undated) DEVICE — SOL NACL 0.9% IRRIG 1000ML BOTTLE 2F7124

## (undated) DEVICE — GLOVE BIOGEL PI MICRO INDICATOR UNDERGLOVE SZ 6.5 48965

## (undated) DEVICE — ESU PENCIL W/HOLSTER E2350H

## (undated) DEVICE — SUCTION IRR STRYKERFLOW II W/TIP 250-070-520

## (undated) DEVICE — ENDO TROCAR SLEEVE KII Z-THREADED 05X100MM CTS02

## (undated) DEVICE — ESU LIGASURE MARYLAND LAPAROSCOPIC SLR/DVDR 5MMX37CM LF1937

## (undated) DEVICE — BAG CLEAR TRASH 1.3M 39X33" P4040C

## (undated) DEVICE — STPL POWERED ECHELON 45MM PSEE45A

## (undated) DEVICE — LINEN HALF SHEET 5512

## (undated) DEVICE — NDL 22GA 1.5"

## (undated) DEVICE — SU VICRYL 0 UR-6 27" J603H

## (undated) DEVICE — ESU GROUND PAD ADULT W/CORD E7507

## (undated) DEVICE — SU VICRYL 4-0 PS-2 18" UND J496H

## (undated) DEVICE — DECANTER VIAL 2006S

## (undated) DEVICE — SUCTION CANISTER MEDIVAC LINER 3000ML W/LID 65651-530

## (undated) DEVICE — LINEN FULL SHEET 5511

## (undated) DEVICE — ENDO TROCAR BLUNT TIP KII BALLOON 12X100MM C0R47

## (undated) DEVICE — LINEN TOWEL PACK X10 5473

## (undated) DEVICE — LINEN POUCH DBL 5427

## (undated) DEVICE — ESU CORD MONOPOLAR 10'  E0510

## (undated) DEVICE — GLOVE BIOGEL PI MICRO SZ 6.5 48565

## (undated) RX ORDER — DEXAMETHASONE SODIUM PHOSPHATE 4 MG/ML
INJECTION, SOLUTION INTRA-ARTICULAR; INTRALESIONAL; INTRAMUSCULAR; INTRAVENOUS; SOFT TISSUE
Status: DISPENSED
Start: 2023-12-15

## (undated) RX ORDER — FENTANYL CITRATE 50 UG/ML
INJECTION, SOLUTION INTRAMUSCULAR; INTRAVENOUS
Status: DISPENSED
Start: 2023-12-15

## (undated) RX ORDER — GLYCOPYRROLATE 0.2 MG/ML
INJECTION INTRAMUSCULAR; INTRAVENOUS
Status: DISPENSED
Start: 2023-12-15

## (undated) RX ORDER — PROPOFOL 10 MG/ML
INJECTION, EMULSION INTRAVENOUS
Status: DISPENSED
Start: 2023-12-15

## (undated) RX ORDER — BUPIVACAINE HYDROCHLORIDE 5 MG/ML
INJECTION, SOLUTION EPIDURAL; INTRACAUDAL
Status: DISPENSED
Start: 2023-12-15

## (undated) RX ORDER — LIDOCAINE HYDROCHLORIDE 10 MG/ML
INJECTION, SOLUTION EPIDURAL; INFILTRATION; INTRACAUDAL; PERINEURAL
Status: DISPENSED
Start: 2023-12-15

## (undated) RX ORDER — FENTANYL CITRATE-0.9 % NACL/PF 10 MCG/ML
PLASTIC BAG, INJECTION (ML) INTRAVENOUS
Status: DISPENSED
Start: 2023-12-15

## (undated) RX ORDER — CEFAZOLIN SODIUM 1 G/3ML
INJECTION, POWDER, FOR SOLUTION INTRAMUSCULAR; INTRAVENOUS
Status: DISPENSED
Start: 2023-12-15